# Patient Record
Sex: MALE | Race: WHITE | NOT HISPANIC OR LATINO | Employment: OTHER | URBAN - METROPOLITAN AREA
[De-identification: names, ages, dates, MRNs, and addresses within clinical notes are randomized per-mention and may not be internally consistent; named-entity substitution may affect disease eponyms.]

---

## 2022-09-26 ENCOUNTER — HOSPITAL ENCOUNTER (EMERGENCY)
Facility: HOSPITAL | Age: 52
Discharge: HOME | End: 2022-09-26
Attending: EMERGENCY MEDICINE
Payer: MEDICARE

## 2022-09-26 VITALS
TEMPERATURE: 98 F | WEIGHT: 315 LBS | BODY MASS INDEX: 41.75 KG/M2 | SYSTOLIC BLOOD PRESSURE: 144 MMHG | HEART RATE: 21 BPM | DIASTOLIC BLOOD PRESSURE: 81 MMHG | OXYGEN SATURATION: 98 % | RESPIRATION RATE: 19 BRPM | HEIGHT: 73 IN

## 2022-09-26 DIAGNOSIS — R52 PAIN: ICD-10-CM

## 2022-09-26 DIAGNOSIS — M54.32 SCIATICA OF LEFT SIDE: Primary | ICD-10-CM

## 2022-09-26 PROCEDURE — 63600175 PHARM REV CODE 636 W HCPCS: Performed by: STUDENT IN AN ORGANIZED HEALTH CARE EDUCATION/TRAINING PROGRAM

## 2022-09-26 PROCEDURE — 99284 EMERGENCY DEPT VISIT MOD MDM: CPT

## 2022-09-26 PROCEDURE — 96372 THER/PROPH/DIAG INJ SC/IM: CPT | Performed by: STUDENT IN AN ORGANIZED HEALTH CARE EDUCATION/TRAINING PROGRAM

## 2022-09-26 RX ORDER — HYDROCODONE BITARTRATE AND ACETAMINOPHEN 5; 325 MG/1; MG/1
1 TABLET ORAL EVERY 4 HOURS PRN
Qty: 10 TABLET | Refills: 0 | Status: SHIPPED | OUTPATIENT
Start: 2022-09-26 | End: 2023-08-17 | Stop reason: SDUPTHER

## 2022-09-26 RX ORDER — METHOCARBAMOL 500 MG/1
1000 TABLET, FILM COATED ORAL 3 TIMES DAILY
Qty: 30 TABLET | Refills: 0 | Status: SHIPPED | OUTPATIENT
Start: 2022-09-26 | End: 2022-10-01

## 2022-09-26 RX ORDER — DICLOFENAC SODIUM 75 MG/1
75 TABLET, DELAYED RELEASE ORAL 2 TIMES DAILY
Qty: 14 TABLET | Refills: 0 | Status: SHIPPED | OUTPATIENT
Start: 2022-09-26 | End: 2023-08-30 | Stop reason: CLARIF

## 2022-09-26 RX ORDER — DEXAMETHASONE SODIUM PHOSPHATE 4 MG/ML
4 INJECTION, SOLUTION INTRA-ARTICULAR; INTRALESIONAL; INTRAMUSCULAR; INTRAVENOUS; SOFT TISSUE
Status: COMPLETED | OUTPATIENT
Start: 2022-09-26 | End: 2022-09-26

## 2022-09-26 RX ADMIN — DEXAMETHASONE SODIUM PHOSPHATE 4 MG: 4 INJECTION, SOLUTION INTRA-ARTICULAR; INTRALESIONAL; INTRAMUSCULAR; INTRAVENOUS; SOFT TISSUE at 03:09

## 2022-09-26 NOTE — FIRST PROVIDER EVALUATION
"Medical screening examination initiated.  I have conducted a focused provider triage encounter, findings are as follows:    Brief history of present illness:  left hip and back pain, worse with walking, shoots down left leg. No red flag symtposm of back px    Vitals:    09/26/22 1253   BP: (!) 145/82   BP Location: Left arm   Patient Position: Sitting   Pulse: 64   Resp: 20   Temp: 98.2 °F (36.8 °C)   TempSrc: Oral   SpO2: 98%   Weight: (!) 146.1 kg (322 lb)   Height: 6' 1" (1.854 m)       Pertinent physical exam:  well appearing, no ain with internal rotation. Postive straight leg test. Neurovascularly intact, no swelling.    Brief workup plan:  xray lumbar and left hip, decadron injection    Preliminary workup initiated; this workup will be continued and followed by the physician or advanced practice provider that is assigned to the patient when roomed.  "

## 2022-09-26 NOTE — ED PROVIDER NOTES
Encounter Date: 9/26/2022       History     Chief Complaint   Patient presents with    Hip Pain     C/o L hip pain x 2 weeks. No recent trauma.      Patient presents emergency department with reported low back pain radiating to his left hip symptoms exacerbated with straight leg raise on left with prolonged standing the states he has had previous back pain but has never had positive sciatic of he has had multiple injuries in the past though has been told he had arthritis in his back years ago no previous surgeries reported patient states prior to onset of the symptoms 2 weeks ago he did have a fall out of his tractor landing on his blood midback no bowel or bladder incontinence or retention reported no numbness or tingling reported no weakness      Review of patient's allergies indicates:   Allergen Reactions    Codeine Swelling     Throat swell, rash     No past medical history on file.  No past surgical history on file.  No family history on file.     Review of Systems   Constitutional:  Negative for chills and fever.   Gastrointestinal:  Negative for abdominal pain.   Genitourinary: Negative.    Musculoskeletal:  Positive for arthralgias, back pain and myalgias.   Neurological:  Negative for weakness and numbness.     Physical Exam     Initial Vitals [09/26/22 1253]   BP Pulse Resp Temp SpO2   (!) 145/82 64 20 98.2 °F (36.8 °C) 98 %      MAP       --         Physical Exam    Constitutional: He appears well-developed and well-nourished. No distress.   HENT:   Head: Normocephalic and atraumatic.   Cardiovascular:  Normal rate, regular rhythm and intact distal pulses.           Pulmonary/Chest: No respiratory distress.   Abdominal: Abdomen is soft. There is no abdominal tenderness.   Musculoskeletal:         General: Tenderness present. Normal range of motion.      Comments: Tenderness to palpation at the lumbar spine palpable muscle spasms noted no midline spinal tenderness positive straight leg raise on left      Neurological: He has normal strength.   Reflex Scores:       Patellar reflexes are 2+ on the right side and 2+ on the left side.       Achilles reflexes are 2+ on the right side and 2+ on the left side.      ED Course   Procedures  Labs Reviewed - No data to display       Imaging Results              X-Ray Hip 2 or 3 views Left (with Pelvis when performed) (Final result)  Result time 09/26/22 15:29:11      Final result by Alexis Cantor MD (09/26/22 15:29:11)                   Narrative:    Reason: Fell few days ago; Left Hip pain and lower back pain/Left flank pain    FINDINGS:  AP pelvis and 2 views of left hip show no acute fracture, dislocation, or destructive osseous lesion. Bilateral hip joint spaces are maintained. Tiny marginal osteophytes arise from left femoral head neck junction. Pubic symphysis and sacroiliac joints are maintained. Degenerative spondylosis partially visualized at L3-L4 and L4-L5. Soft tissues are unremarkable.    IMPRESSION:  No acute left hip abnormality.    Electronically signed by:  Alexis Cantor MD  9/26/2022 3:29 PM CDT Workstation: [id number]                                     X-Ray Lumbar Spine 5 View (Final result)  Result time 09/26/22 15:23:14      Final result by Joao Shaw MD (09/26/22 15:23:14)                   Narrative:    HISTORY: Low back pain,  post fall.    FINDINGS: 5 views of the lumbar spine with no prior studies for comparison show retrolisthesis of the L4 vertebral body of 3-4 mm with respect to L3 and L5. Lumbar vertebral alignment is otherwise normal, with normal lordotic curvature, and no acute fractures or destructive osseous lesions.    There is mild to moderate multilevel intervertebral disc space narrowing with vertebral osteophytes, and mild lower lumbar facet hypertrophy. There is no evidence of spondylolysis. The sacroiliac joints are within normal limits. Bone mineralization is normal.    IMPRESSION:  1. No acute fractures or destructive  osseous lesions.  2. Retrolisthesis of the L4 vertebral body as described.  3. Multilevel lumbar degenerative disc disease and lower lumbar facet arthropathy.    Electronically signed by:  Joao Shaw MD  9/26/2022 3:23 PM CDT Workstation: [id number]                                     Medications   dexamethasone injection 4 mg (4 mg Intramuscular Given 9/26/22 1521)     Medical Decision Making:   ED Management:  Patient's radiographs shows degenerative disc disease no acute fractures I will refer patient to Dr. Tompkins is clinic patient received Decadron emergency department will prescribe diclofenac Robaxin and Norco recommend moist he rest return to ER for any worsened symptoms or new symptoms                         Clinical Impression:   Final diagnoses:  [R52] Pain  [M54.32] Sciatica of left side (Primary)        ED Disposition Condition    Discharge Stable          ED Prescriptions       Medication Sig Dispense Start Date End Date Auth. Provider    diclofenac (VOLTAREN) 75 MG EC tablet Take 1 tablet (75 mg total) by mouth 2 (two) times daily. 14 tablet 9/26/2022 -- Tim Jeter MD    methocarbamoL (ROBAXIN) 500 MG Tab Take 2 tablets (1,000 mg total) by mouth 3 (three) times daily. for 5 days 30 tablet 9/26/2022 10/1/2022 iTm Jeter MD    HYDROcodone-acetaminophen (NORCO) 5-325 mg per tablet Take 1 tablet by mouth every 4 (four) hours as needed for Pain. 10 tablet 9/26/2022 -- Tim Jeter MD          Follow-up Information       Follow up With Specialties Details Why Contact Info    Smith Tompkins MD Physical Medicine and Rehabilitation Call in 1 day for re-examination of your symptoms [address] [place] [address] [address], [address]  [facility] [id number] [phone]               Tim Jeter MD  09/26/22 6619

## 2023-06-28 DIAGNOSIS — R91.8 OTHER NONSPECIFIC ABNORMAL FINDING OF LUNG FIELD: Primary | ICD-10-CM

## 2023-08-09 ENCOUNTER — HOSPITAL ENCOUNTER (OUTPATIENT)
Dept: RADIOLOGY | Facility: HOSPITAL | Age: 53
Discharge: HOME | End: 2023-08-09
Attending: INTERNAL MEDICINE
Payer: MEDICARE

## 2023-08-09 VITALS — BODY MASS INDEX: 42.66 KG/M2 | HEIGHT: 72 IN | WEIGHT: 315 LBS

## 2023-08-09 DIAGNOSIS — R91.8 OTHER NONSPECIFIC ABNORMAL FINDING OF LUNG FIELD: ICD-10-CM

## 2023-08-09 LAB — GLUCOSE SERPL-MCNC: 155 MG/DL (ref 70–110)

## 2023-08-09 PROCEDURE — A9552 F18 FDG: HCPCS | Mod: PO

## 2023-08-10 ENCOUNTER — OFFICE VISIT (OUTPATIENT)
Dept: UROLOGY | Facility: CLINIC | Age: 53
End: 2023-08-10
Payer: MEDICARE

## 2023-08-10 VITALS — HEART RATE: 61 BPM

## 2023-08-10 DIAGNOSIS — N50.89 SCROTAL SWELLING: Primary | ICD-10-CM

## 2023-08-10 LAB
BILIRUBIN, UA POC OHS: NEGATIVE
BLOOD, UA POC OHS: NEGATIVE
CLARITY, UA POC OHS: CLEAR
COLOR, UA POC OHS: YELLOW
GLUCOSE, UA POC OHS: NEGATIVE
KETONES, UA POC OHS: NEGATIVE
LEUKOCYTES, UA POC OHS: NEGATIVE
NITRITE, UA POC OHS: NEGATIVE
PH, UA POC OHS: 5.5
PROTEIN, UA POC OHS: NEGATIVE
SPECIFIC GRAVITY, UA POC OHS: 1.01
UROBILINOGEN, UA POC OHS: 0.2

## 2023-08-10 PROCEDURE — 99999 PR PBB SHADOW E&M-EST. PATIENT-LVL III: CPT | Mod: PBBFAC,,, | Performed by: NURSE PRACTITIONER

## 2023-08-10 PROCEDURE — 81003 URINALYSIS AUTO W/O SCOPE: CPT | Mod: QW,S$GLB,, | Performed by: NURSE PRACTITIONER

## 2023-08-10 PROCEDURE — 1159F MED LIST DOCD IN RCRD: CPT | Mod: CPTII,S$GLB,, | Performed by: NURSE PRACTITIONER

## 2023-08-10 PROCEDURE — 1160F PR REVIEW ALL MEDS BY PRESCRIBER/CLIN PHARMACIST DOCUMENTED: ICD-10-PCS | Mod: CPTII,S$GLB,, | Performed by: NURSE PRACTITIONER

## 2023-08-10 PROCEDURE — 99999 PR PBB SHADOW E&M-EST. PATIENT-LVL III: ICD-10-PCS | Mod: PBBFAC,,, | Performed by: NURSE PRACTITIONER

## 2023-08-10 PROCEDURE — 81003 POCT URINALYSIS(INSTRUMENT): ICD-10-PCS | Mod: QW,S$GLB,, | Performed by: NURSE PRACTITIONER

## 2023-08-10 PROCEDURE — 99204 PR OFFICE/OUTPT VISIT, NEW, LEVL IV, 45-59 MIN: ICD-10-PCS | Mod: S$GLB,,, | Performed by: NURSE PRACTITIONER

## 2023-08-10 PROCEDURE — 1160F RVW MEDS BY RX/DR IN RCRD: CPT | Mod: CPTII,S$GLB,, | Performed by: NURSE PRACTITIONER

## 2023-08-10 PROCEDURE — 1159F PR MEDICATION LIST DOCUMENTED IN MEDICAL RECORD: ICD-10-PCS | Mod: CPTII,S$GLB,, | Performed by: NURSE PRACTITIONER

## 2023-08-10 PROCEDURE — 99204 OFFICE O/P NEW MOD 45 MIN: CPT | Mod: S$GLB,,, | Performed by: NURSE PRACTITIONER

## 2023-08-10 RX ORDER — METOCLOPRAMIDE 10 MG/1
10 TABLET ORAL 2 TIMES DAILY
COMMUNITY

## 2023-08-10 RX ORDER — ASPIRIN 500 MG
500 TABLET, DELAYED RELEASE (ENTERIC COATED) ORAL DAILY
COMMUNITY
End: 2023-12-13

## 2023-08-10 RX ORDER — FUROSEMIDE 40 MG/1
40 TABLET ORAL DAILY
COMMUNITY

## 2023-08-10 RX ORDER — GABAPENTIN 400 MG/1
400 CAPSULE ORAL 3 TIMES DAILY
COMMUNITY
End: 2023-10-23 | Stop reason: SDUPTHER

## 2023-08-10 NOTE — PROGRESS NOTES
"CHIEF COMPLAINT:    Mr. Lyles is a 53 y.o. male presenting for right scrotal swelling.  PRESENTING ILLNESS:    Tolu Lyles Sr. is a 53 y.o. male who presents for right scrotal swelling. This is his initial clinic visit.    8/10/23  Patient presents for right scrotal swelling x 1.5 years  He was treated for epididymitis initially 1.5 years ago but swelling returned after treatment.  He was told he had hydrocele and came today to discuss surgery.  He does have discomfort when walking or sitting for long periods of time.    10/18/21 PSA 0.22    Urethral stricture surgery ~10 years ago    No issues voiding. Good urinary stream. Denies dysuria, gross hematuria, flank pain, fever, chills, nausea or vomiting.  UA today negative  PVR 5 ml    History of kidney stones: denies  Personal or family hx of  malignancy: denies  Smoking history: never smoked    Urine cultures:   No results found for: "LABURIN"    REVIEW OF SYSTEMS:    Review of Systems    Constitutional: Negative for fever and chills.   HENT: Negative for hearing loss.   Eyes: Negative for visual disturbance.   Respiratory: Negative for shortness of breath.   Cardiovascular: Negative for chest pain.   Gastrointestinal: Negative for nausea, vomiting.   Genitourinary: See above  Neurological: Negative for dizziness.   Hematological: Does not bruise/bleed easily.   Psychiatric/Behavioral: Negative for confusion.       PATIENT HISTORY:    No past medical history on file.    No past surgical history on file.    No family history on file.    Social History     Socioeconomic History    Marital status:        Allergies:  Codeine    Medications:    Current Outpatient Medications:     aspirin 500 MG EC tablet, Take 500 mg by mouth once daily., Disp: , Rfl:     furosemide (LASIX) 40 MG tablet, Take 40 mg by mouth 2 (two) times daily., Disp: , Rfl:     gabapentin (NEURONTIN) 400 MG capsule, Take 400 mg by mouth 3 (three) times daily., Disp: , Rfl:     " "metoclopramide HCl (REGLAN) 10 MG tablet, Take 10 mg by mouth 4 (four) times daily., Disp: , Rfl:     diclofenac (VOLTAREN) 75 MG EC tablet, Take 1 tablet (75 mg total) by mouth 2 (two) times daily. (Patient not taking: Reported on 8/10/2023), Disp: 14 tablet, Rfl: 0    HYDROcodone-acetaminophen (NORCO) 5-325 mg per tablet, Take 1 tablet by mouth every 4 (four) hours as needed for Pain. (Patient not taking: Reported on 8/10/2023), Disp: 10 tablet, Rfl: 0    PHYSICAL EXAMINATION:    Constitutional: He is oriented to person, place, and time. He appears well-developed and well-nourished.  He is in no apparent distress.    Neck: Normal ROM.     Cardiovascular: Normal rate.      Pulmonary/Chest: Effort normal. No respiratory distress.     Abdominal:  He exhibits no distension.  There is no CVA tenderness.     Neurological: He is alert and oriented to person, place, and time.     Skin: Skin is warm and dry.     Psych: Cooperative with normal affect.    Genitourinary: + right hemiscrotal swelling    Physical Exam      LABS:    No results found for: "PSA", "PSADIAG", "PSATOTAL", "PSAFREE", "PSAFREEPCT"  No results found for: "CREATININE"      IMPRESSION:    Encounter Diagnoses   Name Primary?    Scrotal swelling Yes       PLAN:  -Scrotal US ordered and scheduled  -Pt will need to follow up with MD to discuss surgery/hydrocelectomy  -Scrotal support (e.g., jock strap), ice pack, scrotal elevation when lying down.   Informed patient that he should return to the emergency department immediately should he experience fever or worsening pain and swelling.   -RTC with MD     I encouraged him or any of his family members to call or email me with questions and/or concerns.      45 minutes of total time spent on the encounter, which includes face to face time and non-face to face time preparing to see the patient (eg, review of tests), Obtaining and/or reviewing separately obtained history, Documenting clinical information in the " electronic or other health record, Independently interpreting results (not separately reported) and communicating results to the patient/family/caregiver, or Care coordination (not separately reported).

## 2023-08-10 NOTE — PATIENT INSTRUCTIONS
Scrotal support (e.g., jock strap), ice pack, scrotal elevation when lying down.     Informed patient that he should return to the emergency department immediately should he experience fever or worsening pain and swelling.

## 2023-08-15 ENCOUNTER — HOSPITAL ENCOUNTER (OUTPATIENT)
Dept: RADIOLOGY | Facility: HOSPITAL | Age: 53
Discharge: HOME | End: 2023-08-15
Attending: NURSE PRACTITIONER
Payer: MEDICARE

## 2023-08-15 DIAGNOSIS — N50.89 SCROTAL SWELLING: ICD-10-CM

## 2023-08-15 PROCEDURE — 76870 US EXAM SCROTUM: CPT | Mod: 26,,, | Performed by: RADIOLOGY

## 2023-08-15 PROCEDURE — 76870 US SCROTUM AND TESTICLES: ICD-10-PCS | Mod: 26,,, | Performed by: RADIOLOGY

## 2023-08-15 PROCEDURE — 76870 US EXAM SCROTUM: CPT | Mod: TC

## 2023-08-17 ENCOUNTER — OFFICE VISIT (OUTPATIENT)
Dept: UROLOGY | Facility: CLINIC | Age: 53
End: 2023-08-17
Payer: MEDICARE

## 2023-08-17 ENCOUNTER — TELEPHONE (OUTPATIENT)
Dept: PULMONOLOGY | Facility: CLINIC | Age: 53
End: 2023-08-17
Payer: MEDICARE

## 2023-08-17 ENCOUNTER — OFFICE VISIT (OUTPATIENT)
Dept: PULMONOLOGY | Facility: CLINIC | Age: 53
End: 2023-08-17
Payer: MEDICARE

## 2023-08-17 ENCOUNTER — TELEPHONE (OUTPATIENT)
Dept: FAMILY MEDICINE | Facility: CLINIC | Age: 53
End: 2023-08-17
Payer: MEDICARE

## 2023-08-17 VITALS
BODY MASS INDEX: 33.49 KG/M2 | HEART RATE: 73 BPM | OXYGEN SATURATION: 96 % | SYSTOLIC BLOOD PRESSURE: 146 MMHG | WEIGHT: 247.25 LBS | HEIGHT: 72 IN | DIASTOLIC BLOOD PRESSURE: 78 MMHG

## 2023-08-17 VITALS
HEIGHT: 72 IN | DIASTOLIC BLOOD PRESSURE: 83 MMHG | HEART RATE: 72 BPM | WEIGHT: 253 LBS | SYSTOLIC BLOOD PRESSURE: 126 MMHG | BODY MASS INDEX: 34.27 KG/M2

## 2023-08-17 DIAGNOSIS — R91.8 LUNG MASS: ICD-10-CM

## 2023-08-17 DIAGNOSIS — N43.3 HYDROCELE, UNSPECIFIED HYDROCELE TYPE: Primary | ICD-10-CM

## 2023-08-17 DIAGNOSIS — G89.29 CHRONIC BACK PAIN GREATER THAN 3 MONTHS DURATION: ICD-10-CM

## 2023-08-17 DIAGNOSIS — M54.9 CHRONIC BACK PAIN GREATER THAN 3 MONTHS DURATION: ICD-10-CM

## 2023-08-17 DIAGNOSIS — I20.9 CARDIAC ANGINA: ICD-10-CM

## 2023-08-17 DIAGNOSIS — Z87.448 HISTORY OF URETHRAL STRICTURE: ICD-10-CM

## 2023-08-17 DIAGNOSIS — R45.89 ANXIETY ABOUT HEALTH: ICD-10-CM

## 2023-08-17 DIAGNOSIS — L72.3 SEBACEOUS CYST: ICD-10-CM

## 2023-08-17 DIAGNOSIS — J44.9 CHRONIC OBSTRUCTIVE PULMONARY DISEASE, UNSPECIFIED COPD TYPE: Primary | ICD-10-CM

## 2023-08-17 PROCEDURE — 1160F RVW MEDS BY RX/DR IN RCRD: CPT | Mod: CPTII,S$GLB,, | Performed by: NURSE PRACTITIONER

## 2023-08-17 PROCEDURE — 3008F BODY MASS INDEX DOCD: CPT | Mod: CPTII,S$GLB,, | Performed by: NURSE PRACTITIONER

## 2023-08-17 PROCEDURE — 99214 OFFICE O/P EST MOD 30 MIN: CPT | Mod: S$GLB,,, | Performed by: UROLOGY

## 2023-08-17 PROCEDURE — 3074F PR MOST RECENT SYSTOLIC BLOOD PRESSURE < 130 MM HG: ICD-10-PCS | Mod: CPTII,S$GLB,, | Performed by: UROLOGY

## 2023-08-17 PROCEDURE — 3008F BODY MASS INDEX DOCD: CPT | Mod: CPTII,S$GLB,, | Performed by: UROLOGY

## 2023-08-17 PROCEDURE — 3008F PR BODY MASS INDEX (BMI) DOCUMENTED: ICD-10-PCS | Mod: CPTII,S$GLB,, | Performed by: UROLOGY

## 2023-08-17 PROCEDURE — 3077F SYST BP >= 140 MM HG: CPT | Mod: CPTII,S$GLB,, | Performed by: NURSE PRACTITIONER

## 2023-08-17 PROCEDURE — 99214 PR OFFICE/OUTPT VISIT, EST, LEVL IV, 30-39 MIN: ICD-10-PCS | Mod: S$GLB,,, | Performed by: NURSE PRACTITIONER

## 2023-08-17 PROCEDURE — 3077F PR MOST RECENT SYSTOLIC BLOOD PRESSURE >= 140 MM HG: ICD-10-PCS | Mod: CPTII,S$GLB,, | Performed by: NURSE PRACTITIONER

## 2023-08-17 PROCEDURE — 1160F RVW MEDS BY RX/DR IN RCRD: CPT | Mod: CPTII,S$GLB,, | Performed by: UROLOGY

## 2023-08-17 PROCEDURE — 1159F MED LIST DOCD IN RCRD: CPT | Mod: CPTII,S$GLB,, | Performed by: NURSE PRACTITIONER

## 2023-08-17 PROCEDURE — 99999 PR PBB SHADOW E&M-EST. PATIENT-LVL V: CPT | Mod: PBBFAC,,, | Performed by: NURSE PRACTITIONER

## 2023-08-17 PROCEDURE — 3008F PR BODY MASS INDEX (BMI) DOCUMENTED: ICD-10-PCS | Mod: CPTII,S$GLB,, | Performed by: NURSE PRACTITIONER

## 2023-08-17 PROCEDURE — 3074F SYST BP LT 130 MM HG: CPT | Mod: CPTII,S$GLB,, | Performed by: UROLOGY

## 2023-08-17 PROCEDURE — 1159F MED LIST DOCD IN RCRD: CPT | Mod: CPTII,S$GLB,, | Performed by: UROLOGY

## 2023-08-17 PROCEDURE — 1160F PR REVIEW ALL MEDS BY PRESCRIBER/CLIN PHARMACIST DOCUMENTED: ICD-10-PCS | Mod: CPTII,S$GLB,, | Performed by: NURSE PRACTITIONER

## 2023-08-17 PROCEDURE — 1159F PR MEDICATION LIST DOCUMENTED IN MEDICAL RECORD: ICD-10-PCS | Mod: CPTII,S$GLB,, | Performed by: UROLOGY

## 2023-08-17 PROCEDURE — 1160F PR REVIEW ALL MEDS BY PRESCRIBER/CLIN PHARMACIST DOCUMENTED: ICD-10-PCS | Mod: CPTII,S$GLB,, | Performed by: UROLOGY

## 2023-08-17 PROCEDURE — 99999 PR PBB SHADOW E&M-EST. PATIENT-LVL V: ICD-10-PCS | Mod: PBBFAC,,, | Performed by: NURSE PRACTITIONER

## 2023-08-17 PROCEDURE — 1159F PR MEDICATION LIST DOCUMENTED IN MEDICAL RECORD: ICD-10-PCS | Mod: CPTII,S$GLB,, | Performed by: NURSE PRACTITIONER

## 2023-08-17 PROCEDURE — 99999 PR PBB SHADOW E&M-EST. PATIENT-LVL V: CPT | Mod: PBBFAC,,, | Performed by: UROLOGY

## 2023-08-17 PROCEDURE — 99999 PR PBB SHADOW E&M-EST. PATIENT-LVL V: ICD-10-PCS | Mod: PBBFAC,,, | Performed by: UROLOGY

## 2023-08-17 PROCEDURE — 3079F DIAST BP 80-89 MM HG: CPT | Mod: CPTII,S$GLB,, | Performed by: UROLOGY

## 2023-08-17 PROCEDURE — 3078F PR MOST RECENT DIASTOLIC BLOOD PRESSURE < 80 MM HG: ICD-10-PCS | Mod: CPTII,S$GLB,, | Performed by: NURSE PRACTITIONER

## 2023-08-17 PROCEDURE — 3079F PR MOST RECENT DIASTOLIC BLOOD PRESSURE 80-89 MM HG: ICD-10-PCS | Mod: CPTII,S$GLB,, | Performed by: UROLOGY

## 2023-08-17 PROCEDURE — 99214 PR OFFICE/OUTPT VISIT, EST, LEVL IV, 30-39 MIN: ICD-10-PCS | Mod: S$GLB,,, | Performed by: UROLOGY

## 2023-08-17 PROCEDURE — 3078F DIAST BP <80 MM HG: CPT | Mod: CPTII,S$GLB,, | Performed by: NURSE PRACTITIONER

## 2023-08-17 PROCEDURE — 99214 OFFICE O/P EST MOD 30 MIN: CPT | Mod: S$GLB,,, | Performed by: NURSE PRACTITIONER

## 2023-08-17 RX ORDER — METFORMIN HYDROCHLORIDE 1000 MG/1
1000 TABLET ORAL 2 TIMES DAILY WITH MEALS
COMMUNITY
Start: 2023-07-07

## 2023-08-17 RX ORDER — ALBUTEROL SULFATE 0.83 MG/ML
2.5 SOLUTION RESPIRATORY (INHALATION) EVERY 6 HOURS PRN
Qty: 120 ML | Refills: 3 | Status: SHIPPED | OUTPATIENT
Start: 2023-08-17 | End: 2024-08-16

## 2023-08-17 RX ORDER — INSULIN GLARGINE-YFGN 100 [IU]/ML
INJECTION, SOLUTION SUBCUTANEOUS
COMMUNITY
End: 2023-08-30 | Stop reason: CLARIF

## 2023-08-17 RX ORDER — METHOCARBAMOL 750 MG/1
750 TABLET, FILM COATED ORAL 2 TIMES DAILY
COMMUNITY
Start: 2023-08-15

## 2023-08-17 RX ORDER — OXYCODONE HYDROCHLORIDE 10 MG/1
TABLET ORAL
COMMUNITY
Start: 2023-07-27 | End: 2023-08-30 | Stop reason: CLARIF

## 2023-08-17 RX ORDER — BUDESONIDE, GLYCOPYRROLATE, AND FORMOTEROL FUMARATE 160; 9; 4.8 UG/1; UG/1; UG/1
2 AEROSOL, METERED RESPIRATORY (INHALATION) 2 TIMES DAILY
Qty: 10.7 G | Refills: 11 | Status: SHIPPED | OUTPATIENT
Start: 2023-08-17 | End: 2023-11-09

## 2023-08-17 RX ORDER — OMEPRAZOLE 40 MG/1
40 CAPSULE, DELAYED RELEASE ORAL DAILY
COMMUNITY

## 2023-08-17 RX ORDER — ALPRAZOLAM 0.5 MG/1
0.5 TABLET ORAL 3 TIMES DAILY
Qty: 21 TABLET | Refills: 0 | Status: SHIPPED | OUTPATIENT
Start: 2023-08-17 | End: 2023-08-30 | Stop reason: CLARIF

## 2023-08-17 NOTE — TELEPHONE ENCOUNTER
Spoke with patient in regards to appt on 8/18/23 at 2:15pm needing to be rescheduled due to provider being out of the office.   PT has been rescheduled for  8/25/23 at 2:15pm.   Pt verbalized appt.

## 2023-08-17 NOTE — PATIENT INSTRUCTIONS
Encounter Diagnoses   Name Primary?    Hydrocele, unspecified hydrocele type Yes    Lung mass     Sebaceous cyst     History of urethral stricture     Chronic back pain greater than 3 months duration      PLAN:    Hydrocele-can schedule R hydrocelectomy but have to confirm he has a normal a1c, if > 7.5 high risk for infection. Also would have to hold aspirin for 7 days before. Would need clearance by cardiology. Has had 7 stents.   Call us if there is an infection starting and can start abx    Sebaceous cyst- can remove at time of his hydrocele. Gets infected intermittently. Can use bactrim antibiotic as needed.     Lung nodule- wrote Adilene Gonzalez she will get him in with pulmonlogy today. Referral placed to pulmonology. Lung nodule more urgent. Also having chest pain.     Schedule screening psa.     Referral to cardiology- h/o 7 cardiac stents  Referral to pain medicine    F/u with pcp to establish care to f/u on all of the above.     Fu with me 3 months

## 2023-08-17 NOTE — TELEPHONE ENCOUNTER
Tried call to get scheduled as new pt referred by Dr. Reed. But no answer nor voicemail set up yet

## 2023-08-17 NOTE — PROGRESS NOTES
"[facility], formerly known as [facility]  Group MD's:Brianna/Nathan/Pravin/Nestor/Sharonda  Group NP's: Patti Johnson, Beena Hoffman    PCP: Kuldeep Bunn MD  Date of Service: 08/17/2023  Today's note written by: MD Brianna      CHIEF COMPLAINT:    Mr. Lyles is a 53 y.o. male presenting for right scrotal swelling.  PRESENTING ILLNESS:    Tolu Lyles Sr. is a 53 y.o. male who presents for right scrotal swelling. This is his initial clinic visit.    8/10/23 by Beena   Patient presents for right scrotal swelling x 1.5 years  He was treated for epididymitis initially 1.5 years ago but swelling returned after treatment.  He was told he had hydrocele and came today to discuss surgery.  He does have discomfort when walking or sitting for long periods of time.    10/18/21 PSA 0.22    Urethral stricture surgery ~10 years ago    No issues voiding. Good urinary stream. Denies dysuria, gross hematuria, flank pain, fever, chills, nausea or vomiting.  UA today negative  PVR 5 ml    History of kidney stones: denies  Personal or family hx of  malignancy: denies  Smoking history: never smoked    Interval history 8/17/23:  Us scrotum 8/9/23: large right and small L hydrocele        He is here to discuss his hydrocele. Very big and bothersome. Also says he has been getting intermittent epididymitis on the right. He says started after an episode of sepsis for what he thought was epdidiymitis but on exam today it's a sebaceous cyst that increases in size.He did have stricture surgery every 5-10 years He says hes been getting recurrent epididymitis as  Takes pain medication for recent accident.   On asa 500mg daily for pain   No results found for: "LABA1C", "HGBA1C"  On exam today large R hyrocele. Buried  penis due to hydrocele. Circumcised. Small sebaceous cyst R groin not currently infected.       Urine history: family history of kidney, bladder or prostate cancer:No, personal or " "family history of kidney stones: No,tobacco use: Yes - 1/2 to 1 ppd x  40 years, anticoagulation: Yes - asa 500mg daily   8/10/23 neg    PSA History: no fam hx of prostate cancer  10/18/21 0.25  No results found for: "PSA", "PSATOTAL", "PSAFREE", "PSAFREEPCT"        REVIEW OF SYSTEMS:    Neg except for as stated above      PATIENT HISTORY:    No past medical history on file.  Diabetes  Chronic pain    No past surgical history on file.  appendectomy    No family history on file.    Social History     Socioeconomic History    Marital status:        Allergies:  Codeine        PHYSICAL EXAMINATION:  Vitals:    08/17/23 0934   BP: 126/83   Pulse: 72       Gu exam as above    LABS:      IMPRESSION:    Encounter Diagnoses   Name Primary?    Hydrocele, unspecified hydrocele type Yes    Lung mass     Sebaceous cyst     History of urethral stricture     Chronic back pain greater than 3 months duration      PLAN:    Hydrocele-can schedule R hydrocelectomy but have to confirm he has a normal a1c, if > 7.5 high risk for infection. Also would have to hold aspirin for 7 days before. Would need clearance by cardiology. Has had 7 stents.   Call us if there is an infection starting and can start abx    Sebaceous cyst- can remove at time of his hydrocele. Gets infected intermittently. Can use bactrim antibiotic as needed.     Lung nodule- wrote Adilene Gonzalez she will get him in with pulmonlogy today. Referral placed to pulmonology. Lung nodule more urgent. Also having chest pain.     Schedule screening psa.     Referral to cardiology- h/o 7 cardiac stents  Referral to pain medicine    F/u with pcp to establish care to f/u on all of the above.     Fu with me 3 months       "
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
## 2023-08-18 ENCOUNTER — PATIENT MESSAGE (OUTPATIENT)
Dept: PULMONOLOGY | Facility: CLINIC | Age: 53
End: 2023-08-18
Payer: MEDICARE

## 2023-08-21 NOTE — PATIENT INSTRUCTIONS
Contacting MD Dr. Kelly for robotic navigational bronchoscopy for tissue diagnosis      Take Xanax to help with anxiety, do not drive or make critical decisions until you know how it affects you      If nodule tissue shows infection will treat with appropriate antibiotics.  If nodule tissue shows cancer Will refer to oncology. If you have a provider in mind just contact the pulmonary clinic and I can add to referral.

## 2023-08-23 ENCOUNTER — OFFICE VISIT (OUTPATIENT)
Dept: CARDIOLOGY | Facility: CLINIC | Age: 53
End: 2023-08-23
Payer: MEDICARE

## 2023-08-23 VITALS
OXYGEN SATURATION: 97 % | HEART RATE: 78 BPM | SYSTOLIC BLOOD PRESSURE: 138 MMHG | WEIGHT: 261.94 LBS | DIASTOLIC BLOOD PRESSURE: 85 MMHG | BODY MASS INDEX: 35.52 KG/M2

## 2023-08-23 DIAGNOSIS — R91.1 NODULE OF APEX OF LUNG: ICD-10-CM

## 2023-08-23 DIAGNOSIS — I87.2 VENOUS (PERIPHERAL) INSUFFICIENCY: ICD-10-CM

## 2023-08-23 DIAGNOSIS — S39.92XS INJURY OF LOW BACK, SEQUELA: ICD-10-CM

## 2023-08-23 DIAGNOSIS — Z95.5 H/O HEART ARTERY STENT: ICD-10-CM

## 2023-08-23 DIAGNOSIS — E11.9 DIABETES MELLITUS WITHOUT COMPLICATION: ICD-10-CM

## 2023-08-23 DIAGNOSIS — Z00.00 ENCOUNTER FOR MEDICAL EXAMINATION TO ESTABLISH CARE: Primary | ICD-10-CM

## 2023-08-23 DIAGNOSIS — R59.9 LYMPH NODE ENLARGEMENT: ICD-10-CM

## 2023-08-23 DIAGNOSIS — Z72.0 TOBACCO ABUSE DISORDER: ICD-10-CM

## 2023-08-23 DIAGNOSIS — Z01.818 PREOPERATIVE CLEARANCE: ICD-10-CM

## 2023-08-23 DIAGNOSIS — I25.10 CORONARY ARTERY DISEASE INVOLVING NATIVE CORONARY ARTERY OF NATIVE HEART WITHOUT ANGINA PECTORIS: ICD-10-CM

## 2023-08-23 DIAGNOSIS — I20.9 CARDIAC ANGINA: ICD-10-CM

## 2023-08-23 DIAGNOSIS — N43.3 HYDROCELE, UNSPECIFIED HYDROCELE TYPE: ICD-10-CM

## 2023-08-23 DIAGNOSIS — I20.9 ANGINA PECTORIS, UNSPECIFIED: ICD-10-CM

## 2023-08-23 DIAGNOSIS — J43.1 PANLOBULAR EMPHYSEMA: ICD-10-CM

## 2023-08-23 DIAGNOSIS — R63.4 RECENT UNEXPLAINED WEIGHT LOSS: ICD-10-CM

## 2023-08-23 DIAGNOSIS — I10 PRIMARY HYPERTENSION: ICD-10-CM

## 2023-08-23 DIAGNOSIS — I74.9 EMBOLISM AND THROMBOSIS OF UNSPECIFIED ARTERY: ICD-10-CM

## 2023-08-23 PROCEDURE — 1159F MED LIST DOCD IN RCRD: CPT | Mod: CPTII,S$GLB,, | Performed by: GENERAL PRACTICE

## 2023-08-23 PROCEDURE — 1159F PR MEDICATION LIST DOCUMENTED IN MEDICAL RECORD: ICD-10-PCS | Mod: CPTII,S$GLB,, | Performed by: GENERAL PRACTICE

## 2023-08-23 PROCEDURE — 3079F PR MOST RECENT DIASTOLIC BLOOD PRESSURE 80-89 MM HG: ICD-10-PCS | Mod: CPTII,S$GLB,, | Performed by: GENERAL PRACTICE

## 2023-08-23 PROCEDURE — 3079F DIAST BP 80-89 MM HG: CPT | Mod: CPTII,S$GLB,, | Performed by: GENERAL PRACTICE

## 2023-08-23 PROCEDURE — 99999 PR PBB SHADOW E&M-EST. PATIENT-LVL IV: CPT | Mod: PBBFAC,,, | Performed by: GENERAL PRACTICE

## 2023-08-23 PROCEDURE — 93000 ELECTROCARDIOGRAM COMPLETE: CPT | Mod: S$GLB,,, | Performed by: GENERAL PRACTICE

## 2023-08-23 PROCEDURE — 99205 PR OFFICE/OUTPT VISIT, NEW, LEVL V, 60-74 MIN: ICD-10-PCS | Mod: 25,S$GLB,, | Performed by: GENERAL PRACTICE

## 2023-08-23 PROCEDURE — 3008F PR BODY MASS INDEX (BMI) DOCUMENTED: ICD-10-PCS | Mod: CPTII,S$GLB,, | Performed by: GENERAL PRACTICE

## 2023-08-23 PROCEDURE — 99205 OFFICE O/P NEW HI 60 MIN: CPT | Mod: 25,S$GLB,, | Performed by: GENERAL PRACTICE

## 2023-08-23 PROCEDURE — 93000 EKG 12-LEAD: ICD-10-PCS | Mod: S$GLB,,, | Performed by: GENERAL PRACTICE

## 2023-08-23 PROCEDURE — 99999 PR PBB SHADOW E&M-EST. PATIENT-LVL IV: ICD-10-PCS | Mod: PBBFAC,,, | Performed by: GENERAL PRACTICE

## 2023-08-23 PROCEDURE — 3075F PR MOST RECENT SYSTOLIC BLOOD PRESS GE 130-139MM HG: ICD-10-PCS | Mod: CPTII,S$GLB,, | Performed by: GENERAL PRACTICE

## 2023-08-23 PROCEDURE — 3075F SYST BP GE 130 - 139MM HG: CPT | Mod: CPTII,S$GLB,, | Performed by: GENERAL PRACTICE

## 2023-08-23 PROCEDURE — 3008F BODY MASS INDEX DOCD: CPT | Mod: CPTII,S$GLB,, | Performed by: GENERAL PRACTICE

## 2023-08-23 RX ORDER — OXYCODONE AND ACETAMINOPHEN 10; 325 MG/1; MG/1
TABLET ORAL
COMMUNITY
Start: 2023-06-20 | End: 2023-08-30 | Stop reason: CLARIF

## 2023-08-23 NOTE — LETTER
2023    Tolu Lyles Sr.  [address] [facility] [place] [address]             [facility]  [address]  [place] [address]  Phone: [phone]  Fax: [phone] Patient: Tolu Lyles Sr.  : 1970  Referring Doctor: Alvin Fernandez MD  Telephone:[phone]  Date of Last Office Visit:2023  Consulting Provider: Raymond Kelly MD  Procedure: Endobronchial Ultrasound      Current Outpatient Medications   Medication Sig    albuterol (PROVENTIL) 2.5 mg /3 mL (0.083 %) nebulizer solution Take 3 mLs (2.5 mg total) by nebulization every 6 (six) hours as needed for Wheezing. Rescue    aspirin 500 MG EC tablet Take 500 mg by mouth once daily.    budesonide-glycopyr-formoterol (BREZTRI AEROSPHERE) 160-9-4.8 mcg/actuation HFAA Inhale 2 puffs into the lungs 2 (two) times a day.    furosemide (LASIX) 40 MG tablet Take 40 mg by mouth 2 (two) times daily.    gabapentin (NEURONTIN) 400 MG capsule Take 400 mg by mouth 3 (three) times daily.    insulin glargine-yfgn 100 unit/mL (3 mL) InPn     metFORMIN (GLUCOPHAGE) 1000 MG tablet Take 1,000 mg by mouth 2 (two) times daily with meals.    methocarbamoL (ROBAXIN) 750 MG Tab Take 750 mg by mouth 2 (two) times daily.    metoclopramide HCl (REGLAN) 10 MG tablet Take 10 mg by mouth 4 (four) times daily.    oxyCODONE (ROXICODONE) 10 mg Tab immediate release tablet     oxyCODONE-acetaminophen (PERCOCET)  mg per tablet     ALPRAZolam (XANAX) 0.5 MG tablet Take 1 tablet (0.5 mg total) by mouth 3 (three) times daily. for 7 days (Patient not taking: Reported on 2023)    diclofenac (VOLTAREN) 75 MG EC tablet Take 1 tablet (75 mg total) by mouth 2 (two) times daily. (Patient not taking: Reported on 2023)    omeprazole (PRILOSEC) 40 MG capsule TAKE 1 CAPSULE (40 MG TOTAL) BY MOUTH DAILY.     No current facility-administered medications for this visit.       This patient has  been assessed for risk factors for clearance of surgery with the following stipulations: Moderate Risk    ___ No contraindications  ___ Recommendations for antiplatelet/anticoagulant medications: Hold ASA for ____ days prior to procedure.   ___X Cleared for surgery with the following contraindications/precautions:  ___ Not cleared for surgery due to the following reasons:    High risk but not not prohibitive.    6% risk of Cardiac event with anesthesia within 30 days    If you have any questions regarding the above, please contact my office at [phone]    Sincerely,
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
## 2023-09-01 ENCOUNTER — TELEPHONE (OUTPATIENT)
Dept: PULMONOLOGY | Facility: CLINIC | Age: 53
End: 2023-09-01
Payer: MEDICARE

## 2023-09-01 DIAGNOSIS — R91.8 MASS OF LEFT LUNG: ICD-10-CM

## 2023-09-01 NOTE — TELEPHONE ENCOUNTER
Left voice mail for patient, ordering blood test called [facility].     Instructed Mr. Lyles to expect a phone call from phlebotomy group.    Instructed to send message in portal to schedule a time to talk on phone.

## 2023-09-11 ENCOUNTER — TELEPHONE (OUTPATIENT)
Dept: PULMONOLOGY | Facility: CLINIC | Age: 53
End: 2023-09-11
Payer: MEDICARE

## 2023-09-12 ENCOUNTER — OFFICE VISIT (OUTPATIENT)
Dept: PULMONOLOGY | Facility: CLINIC | Age: 53
End: 2023-09-12
Payer: MEDICARE

## 2023-09-12 VITALS
HEIGHT: 72 IN | WEIGHT: 263.75 LBS | HEART RATE: 65 BPM | SYSTOLIC BLOOD PRESSURE: 122 MMHG | BODY MASS INDEX: 35.72 KG/M2 | OXYGEN SATURATION: 97 % | DIASTOLIC BLOOD PRESSURE: 76 MMHG

## 2023-09-12 DIAGNOSIS — R91.8 LUNG MASS: ICD-10-CM

## 2023-09-12 DIAGNOSIS — G89.11 ACUTE PAIN DUE TO INJURY: Primary | ICD-10-CM

## 2023-09-12 DIAGNOSIS — R45.89 ANXIETY ABOUT HEALTH: ICD-10-CM

## 2023-09-12 PROCEDURE — 1159F PR MEDICATION LIST DOCUMENTED IN MEDICAL RECORD: ICD-10-PCS | Mod: CPTII,S$GLB,, | Performed by: NURSE PRACTITIONER

## 2023-09-12 PROCEDURE — 3078F DIAST BP <80 MM HG: CPT | Mod: CPTII,S$GLB,, | Performed by: NURSE PRACTITIONER

## 2023-09-12 PROCEDURE — 3008F BODY MASS INDEX DOCD: CPT | Mod: CPTII,S$GLB,, | Performed by: NURSE PRACTITIONER

## 2023-09-12 PROCEDURE — 99999 PR PBB SHADOW E&M-EST. PATIENT-LVL III: CPT | Mod: PBBFAC,,, | Performed by: NURSE PRACTITIONER

## 2023-09-12 PROCEDURE — 3078F PR MOST RECENT DIASTOLIC BLOOD PRESSURE < 80 MM HG: ICD-10-PCS | Mod: CPTII,S$GLB,, | Performed by: NURSE PRACTITIONER

## 2023-09-12 PROCEDURE — 1159F MED LIST DOCD IN RCRD: CPT | Mod: CPTII,S$GLB,, | Performed by: NURSE PRACTITIONER

## 2023-09-12 PROCEDURE — 3074F SYST BP LT 130 MM HG: CPT | Mod: CPTII,S$GLB,, | Performed by: NURSE PRACTITIONER

## 2023-09-12 PROCEDURE — 99999 PR PBB SHADOW E&M-EST. PATIENT-LVL III: ICD-10-PCS | Mod: PBBFAC,,, | Performed by: NURSE PRACTITIONER

## 2023-09-12 PROCEDURE — 99214 PR OFFICE/OUTPT VISIT, EST, LEVL IV, 30-39 MIN: ICD-10-PCS | Mod: S$GLB,,, | Performed by: NURSE PRACTITIONER

## 2023-09-12 PROCEDURE — 3008F PR BODY MASS INDEX (BMI) DOCUMENTED: ICD-10-PCS | Mod: CPTII,S$GLB,, | Performed by: NURSE PRACTITIONER

## 2023-09-12 PROCEDURE — 99214 OFFICE O/P EST MOD 30 MIN: CPT | Mod: S$GLB,,, | Performed by: NURSE PRACTITIONER

## 2023-09-12 PROCEDURE — 3074F PR MOST RECENT SYSTOLIC BLOOD PRESSURE < 130 MM HG: ICD-10-PCS | Mod: CPTII,S$GLB,, | Performed by: NURSE PRACTITIONER

## 2023-09-12 RX ORDER — OXYCODONE HYDROCHLORIDE 30 MG/1
30 TABLET ORAL
Qty: 56 TABLET | Refills: 0 | Status: SHIPPED | OUTPATIENT
Start: 2023-09-12 | End: 2023-09-13 | Stop reason: SDUPTHER

## 2023-09-12 RX ORDER — ALPRAZOLAM 0.25 MG/1
0.25 TABLET ORAL 2 TIMES DAILY PRN
Qty: 10 TABLET | Refills: 0 | Status: SHIPPED | OUTPATIENT
Start: 2023-09-12 | End: 2023-10-23

## 2023-09-12 RX ORDER — NALOXONE HYDROCHLORIDE 4 MG/.1ML
1 SPRAY NASAL ONCE
Qty: 1 EACH | Refills: 0 | Status: SHIPPED | OUTPATIENT
Start: 2023-09-12 | End: 2023-09-12
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
## 2023-09-12 NOTE — PATIENT INSTRUCTIONS
Do not take pain medication and xanax together. Show son instructions on using Narcan.     Will proceed with Navigational bronchoscopy, cardiology is needed for clearance.     This does not need to be delayed any further.

## 2023-09-13 ENCOUNTER — TELEPHONE (OUTPATIENT)
Dept: PULMONOLOGY | Facility: CLINIC | Age: 53
End: 2023-09-13
Payer: MEDICARE

## 2023-09-13 DIAGNOSIS — R52 ACUTE PAIN: Primary | ICD-10-CM

## 2023-09-13 DIAGNOSIS — G89.11 ACUTE PAIN DUE TO INJURY: ICD-10-CM

## 2023-09-13 RX ORDER — OXYCODONE HYDROCHLORIDE 30 MG/1
30 TABLET ORAL EVERY 4 HOURS PRN
Qty: 42 TABLET | Refills: 0 | Status: SHIPPED | OUTPATIENT
Start: 2023-09-13 | End: 2023-09-13 | Stop reason: DRUGHIGH

## 2023-09-13 RX ORDER — OXYCODONE HYDROCHLORIDE 20 MG/1
20 TABLET ORAL EVERY 4 HOURS PRN
Qty: 42 TABLET | Refills: 0 | Status: SHIPPED | OUTPATIENT
Start: 2023-09-13 | End: 2023-10-23

## 2023-09-13 NOTE — TELEPHONE ENCOUNTER
Spoke to patient, insurance doesn't want to cover rx. Due to dosage.     ----- Message from Shanell Gilman sent at 9/13/2023  1:43 PM CDT -----  Regarding: RX issue  Contact: pt  Type:  Needs Medical Advice    Who Called: pt    Pharmacy name and phone #:    [facility] - [place], [place] - [address]  [address]  [place] [address]  Phone: [phone] Fax: [phone]      Would the patient rather a call back or a response via [facility]? Call back    Best Call Back Number: [phone]    Additional Information: sts he would like to speak to the Dr regarding a RX oxyCODONE (ROXICODONE) 30 MG Tabthat the pharmacy is having trouble with--please advise and thank you

## 2023-09-13 NOTE — TELEPHONE ENCOUNTER
----- Message from Adilene Gonzalez NP sent at 9/13/2023  1:55 PM CDT -----  Regarding: RE: [facility] pharmacy  Contact: Patient  Every 3 hours as needed for 7 days.     Adliene Gonzalez NP  ----- Message -----  From: Gabby Del Valle LPN  Sent: 9/13/2023  10:10 AM CDT  To: Adilene Gonzalez NP  Subject: [facility] pharmacy                                  Is the pt suppose to be taking every 3 hours ?  ----- Message -----  From: Sweetie Torrez  Sent: 9/13/2023   9:26 AM CDT  To: Carlos Head Staff    Type:  Pharmacy Calling to Clarify an RX    Name of Caller:  Patient    Pharmacy Name:      [facility] - [place], [place] - [address] [address]  Phone: [phone] Fax: [phone]    Prescription Name:  oxyCODONE (ROXICODONE) 30 MG Tab    What do they need to clarify?: Prescription    Best Call Back Number:  [phone]    Additional Information:   States he needs to speak with someone in the office - states pharmacy wants to speak with you to clarify if the prescription is correct - states he is in pain and been trying to get his prescription for a month - please call to advise - thank you

## 2023-09-13 NOTE — TELEPHONE ENCOUNTER
Spoke to pharmacy     ----- Message from Sweetie Torrez sent at 9/13/2023  9:23 AM CDT -----  Contact: Patient  Type:  Pharmacy Calling to Clarify an RX    Name of Caller:  Patient    Pharmacy Name:      [facility] - [place], [place] - [address] [facility]  [address]  [place] [address]  Phone: [phone] Fax: [phone]    Prescription Name:  oxyCODONE (ROXICODONE) 30 MG Tab    What do they need to clarify?: Prescription    Best Call Back Number:  [phone]    Additional Information:   States he needs to speak with someone in the office - states pharmacy wants to speak with you to clarify if the prescription is correct - states he is in pain and been trying to get his prescription for a month - please call to advise - thank you

## 2023-09-14 ENCOUNTER — TELEPHONE (OUTPATIENT)
Dept: PULMONOLOGY | Facility: CLINIC | Age: 53
End: 2023-09-14
Payer: MEDICARE

## 2023-09-14 NOTE — TELEPHONE ENCOUNTER
----- Message from Adilene Gonzalez NP sent at 9/13/2023  5:29 PM CDT -----  Regarding: RE: RX issue  Contact: pt  I spoke to pharmacist, got a lower dose accepted by insurance    Adilene Gonzalez NP  ----- Message -----  From: Phyllis Carroll MA  Sent: 9/13/2023   2:23 PM CDT  To: Adilene Gonzalez NP  Subject: FW: RX issue                                     Insurance doesn't want to cover due to dosage.      ----- Message -----  From: Shanell Gilman  Sent: 9/13/2023   1:46 PM CDT  To: Carlos Head Staff  Subject: RX issue                                         Type:  Needs Medical Advice    Who Called: pt    Pharmacy name and phone #:    [facility] [place] [place] - [address] [facility]  [address] [facility] [address]  Phone: [phone] Fax: [phone]      Would the patient rather a call back or a response via [facility]? Call back    Best Call Back Number: [phone]    Additional Information: sts he would like to speak to the Dr regarding a RX oxyCODONE (ROXICODONE) 30 MG Tabmontrellat the pharmacy is having trouble with--please advise and thank you

## 2023-09-14 NOTE — TELEPHONE ENCOUNTER
Cover my meds #[id number]             Approvedtoday  PA Case: [id number], Status: Approved, Coverage Starts on: 1/1/2023 12:00:00 AM, Coverage Ends on: 12/31/2023 12:00:00 AM. Questions? Contact [phone].

## 2023-09-18 ENCOUNTER — TELEPHONE (OUTPATIENT)
Dept: PULMONOLOGY | Facility: CLINIC | Age: 53
End: 2023-09-18
Payer: MEDICARE

## 2023-09-18 NOTE — TELEPHONE ENCOUNTER
----- Message from Wilda Heller sent at 9/18/2023  9:43 AM CDT -----  Type: Needs Medical Advice  Who Called:  Kenny from [facility]  Symptoms (please be specific):  said she need to speak to the dr about the lung test and ODIFY --please call and advise  Best Call Back Number: [phone]  Additional Information: thank you

## 2023-09-27 ENCOUNTER — TELEPHONE (OUTPATIENT)
Dept: PAIN MEDICINE | Facility: CLINIC | Age: 53
End: 2023-09-27
Payer: MEDICARE

## 2023-09-27 ENCOUNTER — TELEPHONE (OUTPATIENT)
Dept: PULMONOLOGY | Facility: CLINIC | Age: 53
End: 2023-09-27
Payer: MEDICARE

## 2023-09-27 NOTE — TELEPHONE ENCOUNTER
Spoke to patient on phone. Patient lost patient assistance form. Needing new copy.     Willing to travel to [place] for navigational bronchoscopy. Message sent to Dr. Jc Liu requesting he take over case.   Anesthesia at [facility] would not sedate for procedure. He has surgery clearance.     Results from Nodify lung are concerning for cancer.     Patient voiced understanding. Will come to clinic tomorrow to fill out [facility] assistance forms.     Adilene Gonzalez NP     ----- Message -----  From: Wilda Heller  Sent: 9/27/2023   2:26 PM CDT  To: Carlos Head Staff    Type: Needs Medical Advice  Who Called:  pt  Symptoms (please be specific):  said he need to speak to the office said he took a cancer test and he need to know the results--said it's been over a week and no one has contacted him--please call and advise-- pt said he in pain when he walk  Best Call Back Number: [phone] (home)     Additional Information: thank you

## 2023-09-27 NOTE — TELEPHONE ENCOUNTER
Sent message to provider to contact pt     ----- Message from Wilda Heller sent at 9/27/2023  2:22 PM CDT -----  Type: Needs Medical Advice  Who Called:  pt  Symptoms (please be specific):  said he need to speak to the office said he took a cancer test and he need to know the results--said it's been over a week and no one has contacted him--please call and advise-- pt said he in pain when he walk  Best Call Back Number: [phone] (home)     Additional Information: thank you

## 2023-09-27 NOTE — TELEPHONE ENCOUNTER
I called [facility] to cancel the appointment with Dr. Wesley on 10-4-23, got voicemail, call back number was provided.

## 2023-09-28 ENCOUNTER — TELEPHONE (OUTPATIENT)
Dept: PULMONOLOGY | Facility: CLINIC | Age: 53
End: 2023-09-28
Payer: MEDICARE

## 2023-09-28 DIAGNOSIS — J44.9 CHRONIC OBSTRUCTIVE PULMONARY DISEASE, UNSPECIFIED COPD TYPE: Primary | ICD-10-CM

## 2023-09-28 RX ORDER — FLUTICASONE FUROATE, UMECLIDINIUM BROMIDE AND VILANTEROL TRIFENATATE 200; 62.5; 25 UG/1; UG/1; UG/1
1 POWDER RESPIRATORY (INHALATION) DAILY
Qty: 60 EACH | Refills: 0 | Status: SHIPPED | OUTPATIENT
Start: 2023-09-28

## 2023-09-28 NOTE — TELEPHONE ENCOUNTER
I tried to call [facility] to cancel the appointment with Dr. Welsey, got voicemail, call back number was provided.

## 2023-09-29 ENCOUNTER — TELEPHONE (OUTPATIENT)
Dept: PULMONOLOGY | Facility: CLINIC | Age: 53
End: 2023-09-29
Payer: MEDICARE

## 2023-10-02 ENCOUNTER — TELEPHONE (OUTPATIENT)
Dept: UROLOGY | Facility: CLINIC | Age: 53
End: 2023-10-02
Payer: MEDICARE

## 2023-10-02 NOTE — TELEPHONE ENCOUNTER
Phoned patient twice no answer no voicemail to leave a message.   Patient needs f/u appointment per pulmonologist

## 2023-10-04 ENCOUNTER — TELEPHONE (OUTPATIENT)
Dept: PULMONOLOGY | Facility: CLINIC | Age: 53
End: 2023-10-04
Payer: MEDICARE

## 2023-10-04 ENCOUNTER — PATIENT MESSAGE (OUTPATIENT)
Dept: PULMONOLOGY | Facility: CLINIC | Age: 53
End: 2023-10-04
Payer: MEDICARE

## 2023-10-04 NOTE — TELEPHONE ENCOUNTER
----- Message from Cari Miranda sent at 10/4/2023  9:52 AM CDT -----  Contact: self  Type: Needs Medical Advice  Who Called:  pt  Best Call Back Number: [phone]   Additional Information: pt would like to speak with the office.just letting you know not sure why but he cancelled all his appts with [facility]

## 2023-10-04 NOTE — TELEPHONE ENCOUNTER
Spoke to patient.  He called to say thank you for all the help.  He cancelled all his appts because he doesn't believe [facility].  Informed patient I'd let you know.

## 2023-10-10 ENCOUNTER — TELEPHONE (OUTPATIENT)
Dept: PULMONOLOGY | Facility: CLINIC | Age: 53
End: 2023-10-10
Payer: MEDICARE

## 2023-10-10 NOTE — TELEPHONE ENCOUNTER
"Called and spoke with pt to offer an appointment with Pulmonary, informed by patient that [facility] does not accept [facility] coverage.  Patient continued conversation with how awful [facility] care is and his inability to get the care at [facility] in [place].  Offered appointment for 10/18 with Dr. Pryor.  Patient requesting that he also has an appointment with pain management, states that is his problem the pain in his lymph nodes, patient also asking for assistance with getting another PCP since his previous PCP "isn't listening to him". Patient asked if there were doctors in [place] that could help him find out what type of cancer he has.  Informed pt that nurse would reach out to staff to assist in locating a pulmonologist in [place].  No appointment made for [facility].  "

## 2023-10-12 ENCOUNTER — TELEPHONE (OUTPATIENT)
Dept: PULMONOLOGY | Facility: CLINIC | Age: 53
End: 2023-10-12
Payer: MEDICARE

## 2023-10-12 DIAGNOSIS — R91.8 MASS OF LEFT LUNG: Primary | ICD-10-CM

## 2023-10-12 NOTE — PROGRESS NOTES
Sent cover letter with External pulmonary referral on 10/12/2023 at 1600 to [facility]  States:     Attn: Dr. Das or Micah Cuevas NP    Patient has abnormal CT chest. Attempt at navigational bronchoscopy complicated by travel to [place]. Awaiting tissue diagnosis for oncology referral. Needing tissue sample.     Call Adilene Gonzalez NP to discuss further.

## 2023-10-12 NOTE — TELEPHONE ENCOUNTER
----- Message from Adilene Gonzalez NP sent at 10/12/2023  3:43 PM CDT -----  Regarding: FW: Referral  Please call Mr. Lyles and let him know I might be able to call the NP at [facility] and help him be seen there.     Adilene Gonzalez NP  ----- Message -----  From: Nancy Valencia RN  Sent: 10/12/2023   2:47 PM CDT  To: Adilene Gonzalez NP  Subject: Referral                                         Hi    If the referral can be placed for Dr. Christian NAJERA he could get in to see the Pulmonologist within a week.  He can only be seen by a pulmonologist in [place].    Dr. Elsa Gonazles-[phone]    Thanks  Nancy Valencia RN

## 2023-10-12 NOTE — TELEPHONE ENCOUNTER
Called and spoke with pt to inform that the [facility] group could see him in [place],  Informed pt that the appointment scheduled for 10/18 with Dr. Pryor will be cancelled due to authorization pending.  Patient verbalized understanding. Message sent to Dr. Gonzalez office requesting a referral for outside pulmonologist.

## 2023-10-12 NOTE — TELEPHONE ENCOUNTER
Placed a call to the pt in regards to MIO Gonzalez in the process of getting his appointment in [place] with the Pulmonologist. Pt verbalized understanding.

## 2023-10-13 ENCOUNTER — TELEPHONE (OUTPATIENT)
Dept: PULMONOLOGY | Facility: CLINIC | Age: 53
End: 2023-10-13
Payer: MEDICARE

## 2023-10-13 NOTE — TELEPHONE ENCOUNTER
----- Message from Cassandra Potter, Patient Care Assistant sent at 10/13/2023 10:02 AM CDT -----  Regarding: advice  Contact: Yamileth with Dr. Stroud  Type: Needs Medical Advice    Who Called:  Yamileth with Dr. Stroud    Best Call Back Number:      Additional Information: Yamileth with Dr. Stroud states she would like a callback regarding an STAT fax that was sent. Please call to advise. Thanks!

## 2023-10-13 NOTE — TELEPHONE ENCOUNTER
Phyllis sp/w Dr Stroud's office and explained they would need to contact [facility] to access to the PET images

## 2023-10-13 NOTE — TELEPHONE ENCOUNTER
Placed a call to [place] with 's office about the STAT fax that was sent. Staff was unavailable and will call back.

## 2023-10-13 NOTE — TELEPHONE ENCOUNTER
----- Message from Francine Layton sent at 10/13/2023  2:07 PM CDT -----  Regarding: Pt Advice  Contact: Pt [phone]  Missed Callback         Pt returning callback from missed call. Requesting to speak with Adilene Gonzalez. Please call [phone].

## 2023-10-17 ENCOUNTER — TELEPHONE (OUTPATIENT)
Dept: PULMONOLOGY | Facility: CLINIC | Age: 53
End: 2023-10-17
Payer: MEDICARE

## 2023-10-17 NOTE — TELEPHONE ENCOUNTER
Called office they stated they have been trying to call regarding getting images powershared. I have them hospital number.     ----- Message from Jose A Augustin sent at 10/17/2023 10:38 AM CDT -----  Regarding: Dr Stroud  Contact: Dr Stroud  Type:  Needs Medical Advice    Who Called: Yamileth reilly/ Dr Stroud        Would the patient rather a call back or a response via [facility]? Call back    Best Call Back Number: [phone]  [facility] [address]    Additional Information: Sts they need to talk to the office ASAP about the pt they have been waiting and have left several messages and to imaging as well.   Please advise -- Thank you

## 2023-10-19 ENCOUNTER — TELEPHONE (OUTPATIENT)
Dept: FAMILY MEDICINE | Facility: CLINIC | Age: 53
End: 2023-10-19
Payer: MEDICARE

## 2023-10-20 ENCOUNTER — TELEPHONE (OUTPATIENT)
Dept: PAIN MEDICINE | Facility: CLINIC | Age: 53
End: 2023-10-20
Payer: MEDICARE

## 2023-10-20 NOTE — TELEPHONE ENCOUNTER
Called patient letting him know that we are interventional pain,patient   stated that he will cancel his appointment and contact his primary Doctor

## 2023-10-23 ENCOUNTER — OFFICE VISIT (OUTPATIENT)
Dept: PULMONOLOGY | Facility: CLINIC | Age: 53
End: 2023-10-23
Payer: MEDICARE

## 2023-10-23 VITALS
DIASTOLIC BLOOD PRESSURE: 80 MMHG | WEIGHT: 267 LBS | HEIGHT: 72 IN | HEART RATE: 63 BPM | BODY MASS INDEX: 36.16 KG/M2 | OXYGEN SATURATION: 98 % | SYSTOLIC BLOOD PRESSURE: 135 MMHG

## 2023-10-23 DIAGNOSIS — R91.1 PULMONARY NODULE 1 CM OR GREATER IN DIAMETER: ICD-10-CM

## 2023-10-23 DIAGNOSIS — M54.41 CHRONIC BILATERAL LOW BACK PAIN WITH BILATERAL SCIATICA: ICD-10-CM

## 2023-10-23 DIAGNOSIS — R91.1 NODULE OF MIDDLE LOBE OF RIGHT LUNG: Primary | ICD-10-CM

## 2023-10-23 DIAGNOSIS — I25.10 CORONARY ARTERY DISEASE, UNSPECIFIED VESSEL OR LESION TYPE, UNSPECIFIED WHETHER ANGINA PRESENT, UNSPECIFIED WHETHER NATIVE OR TRANSPLANTED HEART: ICD-10-CM

## 2023-10-23 DIAGNOSIS — G89.29 CHRONIC BILATERAL LOW BACK PAIN WITH BILATERAL SCIATICA: ICD-10-CM

## 2023-10-23 DIAGNOSIS — M54.42 CHRONIC BILATERAL LOW BACK PAIN WITH BILATERAL SCIATICA: ICD-10-CM

## 2023-10-23 DIAGNOSIS — E66.01 SEVERE OBESITY (BMI 35.0-39.9) WITH COMORBIDITY: ICD-10-CM

## 2023-10-23 PROCEDURE — 3008F PR BODY MASS INDEX (BMI) DOCUMENTED: ICD-10-PCS | Mod: CPTII,S$GLB,, | Performed by: INTERNAL MEDICINE

## 2023-10-23 PROCEDURE — 1159F PR MEDICATION LIST DOCUMENTED IN MEDICAL RECORD: ICD-10-PCS | Mod: CPTII,S$GLB,, | Performed by: INTERNAL MEDICINE

## 2023-10-23 PROCEDURE — 99204 OFFICE O/P NEW MOD 45 MIN: CPT | Mod: S$GLB,,, | Performed by: INTERNAL MEDICINE

## 2023-10-23 PROCEDURE — 3079F DIAST BP 80-89 MM HG: CPT | Mod: CPTII,S$GLB,, | Performed by: INTERNAL MEDICINE

## 2023-10-23 PROCEDURE — 1160F PR REVIEW ALL MEDS BY PRESCRIBER/CLIN PHARMACIST DOCUMENTED: ICD-10-PCS | Mod: CPTII,S$GLB,, | Performed by: INTERNAL MEDICINE

## 2023-10-23 PROCEDURE — 3008F BODY MASS INDEX DOCD: CPT | Mod: CPTII,S$GLB,, | Performed by: INTERNAL MEDICINE

## 2023-10-23 PROCEDURE — 99204 PR OFFICE/OUTPT VISIT, NEW, LEVL IV, 45-59 MIN: ICD-10-PCS | Mod: S$GLB,,, | Performed by: INTERNAL MEDICINE

## 2023-10-23 PROCEDURE — 1160F RVW MEDS BY RX/DR IN RCRD: CPT | Mod: CPTII,S$GLB,, | Performed by: INTERNAL MEDICINE

## 2023-10-23 PROCEDURE — 3075F SYST BP GE 130 - 139MM HG: CPT | Mod: CPTII,S$GLB,, | Performed by: INTERNAL MEDICINE

## 2023-10-23 PROCEDURE — 3079F PR MOST RECENT DIASTOLIC BLOOD PRESSURE 80-89 MM HG: ICD-10-PCS | Mod: CPTII,S$GLB,, | Performed by: INTERNAL MEDICINE

## 2023-10-23 PROCEDURE — 1159F MED LIST DOCD IN RCRD: CPT | Mod: CPTII,S$GLB,, | Performed by: INTERNAL MEDICINE

## 2023-10-23 PROCEDURE — 3075F PR MOST RECENT SYSTOLIC BLOOD PRESS GE 130-139MM HG: ICD-10-PCS | Mod: CPTII,S$GLB,, | Performed by: INTERNAL MEDICINE

## 2023-10-23 RX ORDER — OXYCODONE AND ACETAMINOPHEN 10; 325 MG/1; MG/1
1 TABLET ORAL EVERY 4 HOURS PRN
Qty: 90 TABLET | Refills: 0 | Status: SHIPPED | OUTPATIENT
Start: 2023-10-23 | End: 2023-11-09

## 2023-10-23 RX ORDER — GABAPENTIN 400 MG/1
800 CAPSULE ORAL 3 TIMES DAILY
Qty: 180 CAPSULE | Refills: 0 | Status: SHIPPED | OUTPATIENT
Start: 2023-10-23

## 2023-10-23 NOTE — PROGRESS NOTES
SUBJECTIVE:    Patient ID: Tolu Lyles Sr. is a 53 y.o. male.    Chief Complaint: Establish Care    HPI the patient is a 53-year-old male who presents with a 2.4 x 2.6 right middle lobe hypermetabolic nodule. He is frustrated because he cannot get any pain medications.  He is almost out of his gabapentin. He is still smoking 10-15 a day. He is down from 2.5 ppd. The patient's biggest issue at this moment is his testicular hydrocele.  The patient states he was told by nurse practitioner Carlos that he had widely metastatic cancer.  The patient has had appointments with the [facility] pulmonologists and the [facility] pulmonologists and now he is here.  I can not figure out exactly why.    Past Medical History:   Diagnosis Date    Chronic back pain     COPD (chronic obstructive pulmonary disease)     Coronary artery disease     Diabetes mellitus     Lung nodule     Sleep apnea     Unspecified injury of head, initial encounter 1975     Past Surgical History:   Procedure Laterality Date    CARDIAC SURGERY  2001    CARIDAC STENTS 7 ;  TEXARCANA     Family History   Problem Relation Age of Onset    Diabetes Mother     Hypertension Mother     Heart disease Mother     Stroke Mother     Diabetes Father     Hypertension Father     Heart disease Father     Stroke Father     Cancer Maternal Uncle         Social History:   Marital Status:   Occupation: Data Unavailable  Alcohol History:  reports that he does not currently use alcohol.  Tobacco History:  reports that he has been smoking cigarettes. He started smoking about 50 years ago. He has a 25.4 pack-year smoking history. He has never used smokeless tobacco.  Drug History:  reports no history of drug use.    Review of patient's allergies indicates:   Allergen Reactions    Codeine Swelling, Anaphylaxis, Hives and Other (See Comments)     Throat swell, rash       Current Outpatient Medications   Medication Sig Dispense Refill    albuterol (PROVENTIL) 2.5 mg /3 mL  (0.083 %) nebulizer solution Take 3 mLs (2.5 mg total) by nebulization every 6 (six) hours as needed for Wheezing. Rescue 120 mL 3    ALPRAZolam (XANAX) 0.25 MG tablet Take 1 tablet (0.25 mg total) by mouth 2 (two) times daily as needed for Anxiety. 10 tablet 0    aspirin 500 MG EC tablet Take 500 mg by mouth once daily.      fluticasone-umeclidin-vilanter (TRELEGY ELLIPTA) 200-62.5-25 mcg inhaler Inhale 1 puff into the lungs once daily. 60 each 0    furosemide (LASIX) 40 MG tablet Take 40 mg by mouth once daily.      gabapentin (NEURONTIN) 400 MG capsule Take 2 capsules (800 mg total) by mouth 3 (three) times daily. 180 capsule 0    insulin detemir U-100, Levemir, (LEVEMIR FLEXPEN) 100 unit/mL (3 mL) InPn pen Inject into the skin every meal as needed.      metFORMIN (GLUCOPHAGE) 1000 MG tablet Take 1,000 mg by mouth 2 (two) times daily with meals.      methocarbamoL (ROBAXIN) 750 MG Tab Take 750 mg by mouth 2 (two) times daily.      metoclopramide HCl (REGLAN) 10 MG tablet Take 10 mg by mouth 4 (four) times daily.      omeprazole (PRILOSEC) 40 MG capsule TAKE 1 CAPSULE (40 MG TOTAL) BY MOUTH DAILY.      budesonide-glycopyr-formoterol (BREZTRI AEROSPHERE) 160-9-4.8 mcg/actuation HFAA Inhale 2 puffs into the lungs 2 (two) times a day. (Patient not taking: Reported on 10/23/2023) 10.7 g 11    oxyCODONE-acetaminophen (PERCOCET)  mg per tablet Take 1 tablet by mouth every 4 (four) hours as needed for Pain. 90 tablet 0     No current facility-administered medications for this visit.       Alpha-1 Antitrypsin:  Last PFT:   Last CT:    Review of Systems  General: Feeling terrible because he is in pain  Eyes: Vision is good.  ENT:  No sinusitis or pharyngitis.   Heart:: No chest pain or palpitations.  Lungs: No cough, sputum, or wheezing.  GI: No Nausea, vomiting, constipation, diarrhea, or reflux.  : No dysuria, hesitancy, or nocturia.  Musculoskeletal:  He complains of severe back pain.  Skin: No lesions or  rashes.  Neuro:  He complains of severe neuropathy in his hands and feet.  He takes high-dose gabapentin to help this.  Lymph: No edema or adenopathy.  Psych: No anxiety or depression.  Endo: No weight change.    OBJECTIVE:      /80 (BP Location: Left arm, Patient Position: Sitting, BP Method: Medium (Manual))   Pulse 63   Ht 6' (1.829 m)   Wt 121.1 kg (267 lb)   SpO2 98%   BMI 36.21 kg/m²     Physical Exam  GENERAL: Older appearing patient in no distress.  HEENT: Pupils equal and reactive. Extraocular movements intact. Nose intact.  Pharynx moist.  NECK: Supple.   HEART: Regular rate and rhythm. No murmur or gallop auscultated.  LUNGS: Clear to auscultation and percussion. Lung excursion symmetrical. No change in fremitus. No adventitial noises.  ABDOMEN: Bowel sounds present. Non-tender, no masses palpated.  EXTREMITIES: Normal muscle tone and joint movement, no cyanosis or clubbing.   LYMPHATICS: No adenopathy palpated, no edema.  SKIN: Dry, intact, no lesions.   NEURO: Cranial nerves II-XII intact. Motor strength 5/5 bilaterally, upper and lower extremities.  PSYCH: Appropriate affect.    Assessment:       1. Nodule of middle lobe of right lung    2. Pulmonary nodule 1 cm or greater in diameter    3. Chronic bilateral low back pain with bilateral sciatica    4. Severe obesity (BMI 35.0-39.9) with comorbidity    5. Coronary artery disease, unspecified vessel or lesion type, unspecified whether angina present, unspecified whether native or transplanted heart          Plan:       Nodule of middle lobe of right lung  -     Ambulatory referral/consult to Pulmonology    Pulmonary nodule 1 cm or greater in diameter  -     Complete PFT with bronchodilator; Future  -     Case Request Endoscopy: Bronchoscopy    Chronic bilateral low back pain with bilateral sciatica  -     gabapentin (NEURONTIN) 400 MG capsule; Take 2 capsules (800 mg total) by mouth 3 (three) times daily.  Dispense: 180 capsule; Refill: 0  -      oxyCODONE-acetaminophen (PERCOCET)  mg per tablet; Take 1 tablet by mouth every 4 (four) hours as needed for Pain.  Dispense: 90 tablet; Refill: 0    Severe obesity (BMI 35.0-39.9) with comorbidity    Coronary artery disease, unspecified vessel or lesion type, unspecified whether angina present, unspecified whether native or transplanted heart         No follow-ups on file.  Will proceed with bronchoscopy on Wednesday.  Patient needs to stop smoking.  This is been extensively discussed.  Pain meds filled x1 as the patient appears to be quite desperate for them

## 2023-10-25 ENCOUNTER — ANESTHESIA (OUTPATIENT)
Dept: SURGERY | Facility: HOSPITAL | Age: 53
End: 2023-10-25
Payer: MEDICARE

## 2023-10-25 ENCOUNTER — ANESTHESIA EVENT (OUTPATIENT)
Dept: SURGERY | Facility: HOSPITAL | Age: 53
End: 2023-10-25
Payer: MEDICARE

## 2023-10-25 ENCOUNTER — HOSPITAL ENCOUNTER (OUTPATIENT)
Facility: HOSPITAL | Age: 53
Discharge: HOME | End: 2023-10-25
Attending: INTERNAL MEDICINE | Admitting: INTERNAL MEDICINE
Payer: MEDICARE

## 2023-10-25 VITALS
DIASTOLIC BLOOD PRESSURE: 67 MMHG | RESPIRATION RATE: 17 BRPM | HEART RATE: 62 BPM | SYSTOLIC BLOOD PRESSURE: 141 MMHG | TEMPERATURE: 98 F | OXYGEN SATURATION: 96 %

## 2023-10-25 DIAGNOSIS — R91.1 PULMONARY NODULE 1 CM OR GREATER IN DIAMETER: Primary | ICD-10-CM

## 2023-10-25 DIAGNOSIS — R52 PAIN: ICD-10-CM

## 2023-10-25 LAB
ALBUMIN SERPL BCP-MCNC: 4 G/DL (ref 3.5–5.2)
ALP SERPL-CCNC: 59 U/L (ref 55–135)
ALT SERPL W/O P-5'-P-CCNC: 11 U/L (ref 10–44)
ANION GAP SERPL CALC-SCNC: 5 MMOL/L (ref 8–16)
AST SERPL-CCNC: 14 U/L (ref 10–40)
BILIRUB SERPL-MCNC: 0.6 MG/DL (ref 0.1–1)
BUN SERPL-MCNC: 22 MG/DL (ref 6–20)
CALCIUM SERPL-MCNC: 9.2 MG/DL (ref 8.7–10.5)
CHLORIDE SERPL-SCNC: 104 MMOL/L (ref 95–110)
CO2 SERPL-SCNC: 30 MMOL/L (ref 23–29)
CREAT SERPL-MCNC: 1.2 MG/DL (ref 0.5–1.4)
ERYTHROCYTE [DISTWIDTH] IN BLOOD BY AUTOMATED COUNT: 13 % (ref 11.5–14.5)
EST. GFR  (NO RACE VARIABLE): >60 ML/MIN/1.73 M^2
GLUCOSE SERPL-MCNC: 156 MG/DL (ref 70–110)
HCT VFR BLD AUTO: 48.3 % (ref 40–54)
HGB BLD-MCNC: 16.5 G/DL (ref 14–18)
MCH RBC QN AUTO: 31.3 PG (ref 27–31)
MCHC RBC AUTO-ENTMCNC: 34.2 G/DL (ref 32–36)
MCV RBC AUTO: 92 FL (ref 82–98)
PLATELET # BLD AUTO: 206 K/UL (ref 150–450)
PMV BLD AUTO: 10.3 FL (ref 9.2–12.9)
POTASSIUM SERPL-SCNC: 4 MMOL/L (ref 3.5–5.1)
PROT SERPL-MCNC: 6.6 G/DL (ref 6–8.4)
RBC # BLD AUTO: 5.28 M/UL (ref 4.6–6.2)
SODIUM SERPL-SCNC: 139 MMOL/L (ref 136–145)
WBC # BLD AUTO: 9.22 K/UL (ref 3.9–12.7)

## 2023-10-25 PROCEDURE — 88360 TUMOR IMMUNOHISTOCHEM/MANUAL: CPT | Mod: TC | Performed by: PATHOLOGY

## 2023-10-25 PROCEDURE — 85027 COMPLETE CBC AUTOMATED: CPT | Performed by: ANESTHESIOLOGY

## 2023-10-25 PROCEDURE — 63600175 PHARM REV CODE 636 W HCPCS: Performed by: NURSE ANESTHETIST, CERTIFIED REGISTERED

## 2023-10-25 PROCEDURE — D9220A PRA ANESTHESIA: Mod: CRNA,,, | Performed by: NURSE ANESTHETIST, CERTIFIED REGISTERED

## 2023-10-25 PROCEDURE — 31623 PR BRONCHOSCOPY,DIAGNOSTIC W BRUSH: ICD-10-PCS | Mod: 51,,, | Performed by: INTERNAL MEDICINE

## 2023-10-25 PROCEDURE — D9220A PRA ANESTHESIA: ICD-10-PCS | Mod: CRNA,,, | Performed by: NURSE ANESTHETIST, CERTIFIED REGISTERED

## 2023-10-25 PROCEDURE — 88305 TISSUE EXAM BY PATHOLOGIST: CPT | Mod: TC | Performed by: PATHOLOGY

## 2023-10-25 PROCEDURE — 31623 DX BRONCHOSCOPE/BRUSH: CPT | Performed by: INTERNAL MEDICINE

## 2023-10-25 PROCEDURE — 27200944 HC BRONCH FORCEPS DISPOSABLE: Performed by: INTERNAL MEDICINE

## 2023-10-25 PROCEDURE — 37000009 HC ANESTHESIA EA ADD 15 MINS: Performed by: INTERNAL MEDICINE

## 2023-10-25 PROCEDURE — 31624 DX BRONCHOSCOPE/LAVAGE: CPT | Performed by: INTERNAL MEDICINE

## 2023-10-25 PROCEDURE — 25000003 PHARM REV CODE 250: Performed by: INTERNAL MEDICINE

## 2023-10-25 PROCEDURE — D9220A PRA ANESTHESIA: Mod: ANES,,, | Performed by: ANESTHESIOLOGY

## 2023-10-25 PROCEDURE — 31623 DX BRONCHOSCOPE/BRUSH: CPT | Mod: 51,,, | Performed by: INTERNAL MEDICINE

## 2023-10-25 PROCEDURE — 31628 BRONCHOSCOPY/LUNG BX EACH: CPT | Mod: RT,,, | Performed by: INTERNAL MEDICINE

## 2023-10-25 PROCEDURE — 80053 COMPREHEN METABOLIC PANEL: CPT | Performed by: ANESTHESIOLOGY

## 2023-10-25 PROCEDURE — 31624 DX BRONCHOSCOPE/LAVAGE: CPT | Mod: 51,,, | Performed by: INTERNAL MEDICINE

## 2023-10-25 PROCEDURE — D9220A PRA ANESTHESIA: ICD-10-PCS | Mod: ANES,,, | Performed by: ANESTHESIOLOGY

## 2023-10-25 PROCEDURE — 31628 PR BRONCHOSCOPY,TRANSBRONCH BIOPSY: ICD-10-PCS | Mod: RT,,, | Performed by: INTERNAL MEDICINE

## 2023-10-25 PROCEDURE — 37000008 HC ANESTHESIA 1ST 15 MINUTES: Performed by: INTERNAL MEDICINE

## 2023-10-25 PROCEDURE — 25000242 PHARM REV CODE 250 ALT 637 W/ HCPCS: Performed by: INTERNAL MEDICINE

## 2023-10-25 PROCEDURE — 31624 PR BRONCHOSCOPY,DIAG2STIC W LAVAGE: ICD-10-PCS | Mod: 51,,, | Performed by: INTERNAL MEDICINE

## 2023-10-25 PROCEDURE — 27201114 HC TRAP (ANY): Performed by: INTERNAL MEDICINE

## 2023-10-25 PROCEDURE — 31628 BRONCHOSCOPY/LUNG BX EACH: CPT | Performed by: INTERNAL MEDICINE

## 2023-10-25 PROCEDURE — 27200946 HC BRUSH, CYTOLOGY: Performed by: INTERNAL MEDICINE

## 2023-10-25 RX ORDER — LIDOCAINE HYDROCHLORIDE 40 MG/ML
10 SOLUTION TOPICAL ONCE
Status: CANCELLED | OUTPATIENT
Start: 2023-10-25 | End: 2023-10-25

## 2023-10-25 RX ORDER — PROPOFOL 10 MG/ML
VIAL (ML) INTRAVENOUS CONTINUOUS PRN
Status: DISCONTINUED | OUTPATIENT
Start: 2023-10-25 | End: 2023-10-25

## 2023-10-25 RX ORDER — ONDANSETRON 2 MG/ML
INJECTION INTRAMUSCULAR; INTRAVENOUS
Status: DISCONTINUED | OUTPATIENT
Start: 2023-10-25 | End: 2023-10-25

## 2023-10-25 RX ORDER — LIDOCAINE HYDROCHLORIDE 40 MG/ML
5 INJECTION, SOLUTION RETROBULBAR ONCE
Status: COMPLETED | OUTPATIENT
Start: 2023-10-25 | End: 2023-10-25

## 2023-10-25 RX ORDER — FENTANYL CITRATE 50 UG/ML
INJECTION, SOLUTION INTRAMUSCULAR; INTRAVENOUS
Status: DISCONTINUED | OUTPATIENT
Start: 2023-10-25 | End: 2023-10-25

## 2023-10-25 RX ORDER — ALBUTEROL SULFATE 0.83 MG/ML
2.5 SOLUTION RESPIRATORY (INHALATION) ONCE
Status: COMPLETED | OUTPATIENT
Start: 2023-10-25 | End: 2023-10-25

## 2023-10-25 RX ORDER — LIDOCAINE HYDROCHLORIDE 10 MG/ML
20 INJECTION INFILTRATION; PERINEURAL ONCE
Status: COMPLETED | OUTPATIENT
Start: 2023-10-25 | End: 2023-10-25

## 2023-10-25 RX ORDER — LIDOCAINE HYDROCHLORIDE 20 MG/ML
JELLY TOPICAL
Status: COMPLETED | OUTPATIENT
Start: 2023-10-25 | End: 2023-10-25

## 2023-10-25 RX ADMIN — SODIUM CHLORIDE, SODIUM LACTATE, POTASSIUM CHLORIDE, AND CALCIUM CHLORIDE: .6; .31; .03; .02 INJECTION, SOLUTION INTRAVENOUS at 10:10

## 2023-10-25 RX ADMIN — PROPOFOL 100 MCG/KG/MIN: 10 INJECTION, EMULSION INTRAVENOUS at 10:10

## 2023-10-25 RX ADMIN — FENTANYL CITRATE 100 MCG: 50 INJECTION, SOLUTION INTRAMUSCULAR; INTRAVENOUS at 10:10

## 2023-10-25 RX ADMIN — ONDANSETRON 4 MG: 2 INJECTION INTRAMUSCULAR; INTRAVENOUS at 10:10

## 2023-10-25 NOTE — H&P
SUBJECTIVE:    Patient ID: Tolu Lyles Sr. is a 53 y.o. male.    Chief Complaint: No chief complaint on file.    HPI the patient is a 53-year-old male who presents with a 2.4 x 2.6 right middle lobe hypermetabolic nodule. He is frustrated because he cannot get any pain medications.  He is almost out of his gabapentin. He is still smoking 10-15 a day. He is down from 2.5 ppd. The patient's biggest issue at this moment is his testicular hydrocele.  The patient states he was told by nurse practitioner Carlos that he had widely metastatic cancer.  The patient has had appointments with the [facility] pulmonologists and the [facility] pulmonologists and now he is here.  I can not figure out exactly why.    Past Medical History:   Diagnosis Date    Chronic back pain     COPD (chronic obstructive pulmonary disease)     Coronary artery disease     Diabetes mellitus     Lung nodule     Sleep apnea     Unspecified injury of head, initial encounter 1975     Past Surgical History:   Procedure Laterality Date    CARDIAC SURGERY  2001    CARIDAC STENTS 7 ;  TEXARCANA     Family History   Problem Relation Age of Onset    Diabetes Mother     Hypertension Mother     Heart disease Mother     Stroke Mother     Diabetes Father     Hypertension Father     Heart disease Father     Stroke Father     Cancer Maternal Uncle         Social History:   Marital Status:   Occupation: Data Unavailable  Alcohol History:  reports that he does not currently use alcohol.  Tobacco History:  reports that he has been smoking cigarettes. He started smoking about 50 years ago. He has a 25.4 pack-year smoking history. He has never used smokeless tobacco.  Drug History:  reports no history of drug use.    Review of patient's allergies indicates:   Allergen Reactions    Codeine Swelling, Anaphylaxis, Hives and Other (See Comments)     Throat swell, rash       Current Facility-Administered Medications   Medication Dose Route Frequency Provider Last Rate  Last Admin    albuterol nebulizer solution 2.5 mg  2.5 mg Nebulization Once Alice Holman MD        LIDOcaine (PF) 40 mg/mL (4 %) injection 5 mL  5 mL Nebulization Once Alice Holman MD        LIDOcaine HCL 10 mg/ml (1%) injection 20 mL  20 mL Other Once Alice Holman MD        LIDOcaine HCL 2% jelly   Topical (Top) PRN Alice Holman MD           Alpha-1 Antitrypsin:  Last PFT:   Last CT:    Review of Systems  General: Feeling terrible because he is in pain  Eyes: Vision is good.  ENT:  No sinusitis or pharyngitis.   Heart:: No chest pain or palpitations.  Lungs: No cough, sputum, or wheezing.  GI: No Nausea, vomiting, constipation, diarrhea, or reflux.  : No dysuria, hesitancy, or nocturia.  Musculoskeletal:  He complains of severe back pain.  Skin: No lesions or rashes.  Neuro:  He complains of severe neuropathy in his hands and feet.  He takes high-dose gabapentin to help this.  Lymph: No edema or adenopathy.  Psych: No anxiety or depression.  Endo: No weight change.    OBJECTIVE:      There were no vitals taken for this visit.    Physical Exam  GENERAL: Older appearing patient in no distress.  HEENT: Pupils equal and reactive. Extraocular movements intact. Nose intact.  Pharynx moist.  NECK: Supple.   HEART: Regular rate and rhythm. No murmur or gallop auscultated.  LUNGS: Clear to auscultation and percussion. Lung excursion symmetrical. No change in fremitus. No adventitial noises.  ABDOMEN: Bowel sounds present. Non-tender, no masses palpated.  EXTREMITIES: Normal muscle tone and joint movement, no cyanosis or clubbing.   LYMPHATICS: No adenopathy palpated, no edema.  SKIN: Dry, intact, no lesions.   NEURO: Cranial nerves II-XII intact. Motor strength 5/5 bilaterally, upper and lower extremities.  PSYCH: Appropriate affect.    Assessment:       1. Pain          Plan:       Pain  -     FL Flouro Usage; Standing  -     FL Flouro Usage    Other orders  -     Place in Outpatient; Standing  -      Cancel: Full code; Standing  -     albuterol nebulizer solution 2.5 mg  -     LIDOcaine HCL 2% jelly  -     LIDOcaine HCL 10 mg/ml (1%) injection 20 mL  -     Vital signs per unit routine; Standing  -     Verify informed consent; Standing  -     Assess per unit routine; Standing  -     Insert peripheral IV if not present; Standing  -     Remove glasses and/or dentures; Standing  -     Discontinue IV; Standing  -     Discharge instructions; Standing  -     LIDOcaine (PF) 40 mg/mL (4 %) injection 5 mL  -     Inhalation Treatment Once; Standing  -     Full code; Standing  -     LIDOcaine HCL 4% external solution 10 mL  -     Cytology Specimen-Medical Cytology (Fluid/Wash/Brush); Standing  -     AFB Culture & Smear; Standing  -     KOH prep; Standing  -     Fungus culture; Standing  -     AFB Culture & Smear; Standing  -     Fungus culture; Standing  -     KOH prep; Standing  -     Tissue Specimen To Pathology, Cardiology/Pulmonary; Standing  -     FL Less Than 1 Hour; Standing  -     Culture, Respiratory; Standing  -     Gram stain; Standing  -     AFB Culture & Smear; Standing  -     Fungus culture; Standing  -     KOH prep; Standing  -     Cytology Specimen-Medical Cytology (Fluid/Wash/Brush); Standing  -     X-Ray Chest AP Single View; Standing  -     Place in Outpatient; Standing         No follow-ups on file.

## 2023-10-25 NOTE — ANESTHESIA PREPROCEDURE EVALUATION
"                                                                                                             10/25/2023  Tolu Lyles Sr. is a 53 y.o., male.      Patient Active Problem List   Diagnosis    BMI 35.0-35.9,adult    Pulmonary nodule 1 cm or greater in diameter    Chronic bilateral low back pain with bilateral sciatica    Severe obesity (BMI 35.0-39.9) with comorbidity    Coronary artery disease       Past Surgical History:   Procedure Laterality Date    CARDIAC SURGERY  2001    CARIDAC STENTS 7 ;  TEXARCANA        Tobacco Use:  The patient  reports that he has been smoking cigarettes. He started smoking about 50 years ago. He has a 25.4 pack-year smoking history. He has never used smokeless tobacco.     Results for orders placed or performed in visit on 08/23/23   IN OFFICE EKG 12-LEAD (to [facility])    Collection Time: 08/23/23  3:35 PM    Narrative    Test Reason : Z01.818,    Vent. Rate : 072 BPM     Atrial Rate : 072 BPM     P-R Int : 134 ms          QRS Dur : 092 ms      QT Int : 370 ms       P-R-T Axes : 045 109 067 degrees     QTc Int : 405 ms    Normal sinus rhythm  Rightward axis  Borderline Abnormal ECG  No previous ECGs available  Confirmed by Rebecca ESCALANTE, Alvin KRAUSE ([id number]) on 9/19/2023 8:12:07 PM    Referred By:             Confirmed By:Alvin Fernandez MD             No results found for: "WBC", "HGB", "HCT", "MCV", "PLT"  BMP  No results found for: "NA", "K", "CL", "CO2", "BUN", "CREATININE", "CALCIUM", "ANIONGAP", "GLU"    No results found for this or any previous visit.              Pre-op Assessment    I have reviewed the Patient Summary Reports.     I have reviewed the Nursing Notes. I have reviewed the NPO Status.   I have reviewed the Medications.     Review of Systems  Anesthesia Hx:  No problems with previous Anesthesia  History of prior surgery of interest to airway management or planning: heart surgery. Denies Family Hx of Anesthesia complications.   Denies Personal Hx of " Anesthesia complications.   Social:  Smoker, No Alcohol Use    Hematology/Oncology:  Hematology Normal   Oncology Normal     EENT/Dental:EENT/Dental Normal   Cardiovascular:   CAD asymptomatic CABG/stent (hx of stents place 15 y ago and again 10 y ago, no records)  ECG has been reviewed. Patient not followed by Cardiologist at this time    Pulmonary:   COPD, mild Sleep Apnea Lung nodule    QOD inhaler use (with exertion)    No Nebulizer use or Home Oxygen use     Patient without C PAP use post 200 lb wt loss   Education provided regarding risk of obstructive sleep apnea     Renal/:   Hx CESIA    Hepatic/GI:   GERD, well controlled    Musculoskeletal:   Bilateral sciatic nerve pain  Spine Disorders: (chronic intermittent low back pain with occ bilat leg symptoms) lumbar and cervical Degenerative disease and Chronic Pain    Neurological:   Neuromuscular Disease, (bilat sciatica symptoms)    Endocrine:   Diabetes, type 2  Obesity / BMI > 30  Dermatological:  Skin Normal    Psych:  Psychiatric Normal           Physical Exam  General: Well nourished, Cooperative, Alert and Oriented    Airway:  Mallampati: III / II  Mouth Opening: Normal  TM Distance: Normal  Tongue: Normal  Neck ROM: Normal ROM    Dental:  Caps / Implants, Intact    Chest/Lungs:  Normal Respiratory Rate  inspiratory wheezes    Heart:  Rate: Normal  Rhythm: Regular Rhythm  Sounds: Normal    Abdomen:  Normal, Soft, Nontender        Anesthesia Plan  Type of Anesthesia, risks & benefits discussed:    Anesthesia Type: Gen Natural Airway  Intra-op Monitoring Plan: Standard ASA Monitors  Post Op Pain Control Plan:   (medical reason for not using multimodal pain management)  Induction:  IV  Informed Consent: Informed consent signed with the Patient and all parties understand the risks and agree with anesthesia plan.  All questions answered. Patient consented to blood products? No  ASA Score: 3 Emergent  Anesthesia Plan Notes:       Check pending labs   General  Natural Airway  POM  Propofol  Zofran    Ready For Surgery From Anesthesia Perspective.     .

## 2023-10-25 NOTE — ANESTHESIA POSTPROCEDURE EVALUATION
Anesthesia Post Evaluation    Patient: Tolu Lyles     Procedure(s) Performed: Procedure(s) (LRB):  BRONCHOSCOPY, WITH FLUOROSCOPY (N/A)    Final Anesthesia Type: general      Patient location during evaluation: GI PACU  Patient participation: Yes- Able to Participate  Level of consciousness: awake and alert, oriented and awake  Post-procedure vital signs: reviewed and stable  Pain management: adequate  Airway patency: patent    PONV status at discharge: No PONV  Anesthetic complications: no      Cardiovascular status: blood pressure returned to baseline, hemodynamically stable and stable  Respiratory status: unassisted, spontaneous ventilation and room air  Hydration status: euvolemic  Follow-up not needed.          Vitals Value Taken Time   /70 10/25/23 1131   Temp 98.0 10/25/23 1136   Pulse 81 10/25/23 1133   Resp 22 10/25/23 1136   SpO2 96 % 10/25/23 1133   Vitals shown include unvalidated device data.      No case tracking events are documented in the log.      Pain/Mellisa Score: No data recorded

## 2023-10-25 NOTE — PROCEDURES
Bronchoscopy    Indication:  Right middle lobe PET hot nodule  Medications:  Per anesthesia  Specimens:  Bronchial lavage, transbronchial brushes, transbronchial lung tissue biopsy  Complications:  None    The bronchoscope was introduced through the oral pharynx as neither nostril would allow passage of the bronchoscope.. The hypopharynx and larynx were unremarkable. The vocal cords were midline and opposed well. The trachea was midline. The aubrie was sharp. The right upper lobe, right middle lobe, and right lower lobe subdivided into the usual subsegments. There were no endobronchial or submucosal abnormalities noted. The left upper lobe and left lower lobe subdivided into the usual subsegments. There were no endobronchial or submucosal abnormalities noted.  Bronchoscope could not be introduced into the right middle lobe as the orifice was too small.  The tumor could not be seen from the orifice of the right middle lobe.  Lavage was taken of the right middle lobe.  180 cc were instilled and 25 were returned.  This was followed by 2 transbronchial brushes and 10 transbronchial lung tissue biopsies.  The specimens were sent for cytology and histopathology. A post procedure x-ray has been ordered. The patient tolerated the procedure well.

## 2023-10-25 NOTE — TRANSFER OF CARE
Anesthesia Transfer of Care Note    Patient: Tolu Lyles     Procedure(s) Performed: Procedure(s) (LRB):  BRONCHOSCOPY, WITH FLUOROSCOPY (N/A)    Patient location: GI    Anesthesia Type: general    Transport from OR: Transported from OR on 2-3 L/min O2 by NC with adequate spontaneous ventilation    Post pain: adequate analgesia    Post assessment: no apparent anesthetic complications    Post vital signs: stable    Level of consciousness: awake and alert    Nausea/Vomiting: no nausea/vomiting    Complications: none    Transfer of care protocol was followed      Last vitals:   Visit Vitals  /63 (BP Location: Left arm, Patient Position: Lying)   Pulse 64   Temp 36.9 °C (98.5 °F) (Oral)   Resp 16   SpO2 96%

## 2023-10-30 ENCOUNTER — TELEPHONE (OUTPATIENT)
Dept: PULMONOLOGY | Facility: HOSPITAL | Age: 53
End: 2023-10-30

## 2023-11-01 ENCOUNTER — TELEPHONE (OUTPATIENT)
Dept: PULMONOLOGY | Facility: HOSPITAL | Age: 53
End: 2023-11-01

## 2023-11-01 NOTE — TELEPHONE ENCOUNTER
Spoke with patient about his diagnosis of a carcinoid.  Hopefully this is resectable.  There is no evidence of metastatic disease on his PET scan.  The patient did not get his pulmonary function studies done.  Explained that this is necessary before there is consideration of a right middle lobe lobectomy.  Gave him scheduling's phone number.

## 2023-11-02 ENCOUNTER — TELEPHONE (OUTPATIENT)
Dept: UROLOGY | Facility: CLINIC | Age: 53
End: 2023-11-02

## 2023-11-02 ENCOUNTER — LAB VISIT (OUTPATIENT)
Dept: LAB | Facility: HOSPITAL | Age: 53
End: 2023-11-02
Attending: UROLOGY
Payer: MEDICARE

## 2023-11-02 DIAGNOSIS — N43.3 HYDROCELE, UNSPECIFIED HYDROCELE TYPE: ICD-10-CM

## 2023-11-02 LAB
COMPLEXED PSA SERPL-MCNC: 0.78 NG/ML (ref 0–4)
ESTIMATED AVG GLUCOSE: 160 MG/DL (ref 68–131)
HBA1C MFR BLD: 7.2 % (ref 4.5–6.2)

## 2023-11-02 PROCEDURE — 84153 ASSAY OF PSA TOTAL: CPT | Mod: GA | Performed by: UROLOGY

## 2023-11-02 PROCEDURE — 83036 HEMOGLOBIN GLYCOSYLATED A1C: CPT | Mod: GA | Performed by: UROLOGY

## 2023-11-02 NOTE — TELEPHONE ENCOUNTER
Spoke with patient regarding appointment today with  tried assisting patient with scheduling appointment today with  at 1:15pm with hemoglobin a 1c prior to appointment. Patient states he will not be coming to appointment just to pay a copay to discuss surgery. He will find another doctor. All appointments canceled. Patient was under the impression he will have procedure done today

## 2023-11-06 ENCOUNTER — HOSPITAL ENCOUNTER (EMERGENCY)
Facility: HOSPITAL | Age: 53
Discharge: LEFT BEFORE DISCHARGE | End: 2023-11-06
Payer: MEDICARE

## 2023-11-06 VITALS
RESPIRATION RATE: 18 BRPM | OXYGEN SATURATION: 97 % | WEIGHT: 267 LBS | HEIGHT: 72 IN | BODY MASS INDEX: 36.16 KG/M2 | TEMPERATURE: 98 F | DIASTOLIC BLOOD PRESSURE: 66 MMHG | HEART RATE: 79 BPM | SYSTOLIC BLOOD PRESSURE: 139 MMHG

## 2023-11-06 DIAGNOSIS — R07.9 CHEST PAIN: ICD-10-CM

## 2023-11-06 LAB
ALBUMIN SERPL BCP-MCNC: 3.8 G/DL (ref 3.5–5.2)
ALP SERPL-CCNC: 62 U/L (ref 55–135)
ALT SERPL W/O P-5'-P-CCNC: 14 U/L (ref 10–44)
ANION GAP SERPL CALC-SCNC: 11 MMOL/L (ref 8–16)
AST SERPL-CCNC: 10 U/L (ref 10–40)
BASOPHILS # BLD AUTO: 0.06 K/UL (ref 0–0.2)
BASOPHILS NFR BLD: 0.6 % (ref 0–1.9)
BILIRUB SERPL-MCNC: 0.6 MG/DL (ref 0.1–1)
BNP SERPL-MCNC: 10 PG/ML (ref 0–99)
BUN SERPL-MCNC: 26 MG/DL (ref 6–20)
CALCIUM SERPL-MCNC: 9.3 MG/DL (ref 8.7–10.5)
CHLORIDE SERPL-SCNC: 101 MMOL/L (ref 95–110)
CO2 SERPL-SCNC: 28 MMOL/L (ref 23–29)
CREAT SERPL-MCNC: 1.4 MG/DL (ref 0.5–1.4)
DIFFERENTIAL METHOD: ABNORMAL
EOSINOPHIL # BLD AUTO: 0.2 K/UL (ref 0–0.5)
EOSINOPHIL NFR BLD: 1.8 % (ref 0–8)
ERYTHROCYTE [DISTWIDTH] IN BLOOD BY AUTOMATED COUNT: 12.7 % (ref 11.5–14.5)
EST. GFR  (NO RACE VARIABLE): 59 ML/MIN/1.73 M^2
GLUCOSE SERPL-MCNC: 143 MG/DL (ref 70–110)
HCT VFR BLD AUTO: 46.1 % (ref 40–54)
HGB BLD-MCNC: 15.3 G/DL (ref 14–18)
IMM GRANULOCYTES # BLD AUTO: 0.05 K/UL (ref 0–0.04)
IMM GRANULOCYTES NFR BLD AUTO: 0.5 % (ref 0–0.5)
LYMPHOCYTES # BLD AUTO: 2.7 K/UL (ref 1–4.8)
LYMPHOCYTES NFR BLD: 25.4 % (ref 18–48)
MCH RBC QN AUTO: 30.2 PG (ref 27–31)
MCHC RBC AUTO-ENTMCNC: 33.2 G/DL (ref 32–36)
MCV RBC AUTO: 91 FL (ref 82–98)
MONOCYTES # BLD AUTO: 0.7 K/UL (ref 0.3–1)
MONOCYTES NFR BLD: 6.6 % (ref 4–15)
NEUTROPHILS # BLD AUTO: 7 K/UL (ref 1.8–7.7)
NEUTROPHILS NFR BLD: 65.1 % (ref 38–73)
NRBC BLD-RTO: 0 /100 WBC
PLATELET # BLD AUTO: 208 K/UL (ref 150–450)
PMV BLD AUTO: 10.5 FL (ref 9.2–12.9)
POTASSIUM SERPL-SCNC: 4.2 MMOL/L (ref 3.5–5.1)
PROT SERPL-MCNC: 6.6 G/DL (ref 6–8.4)
RBC # BLD AUTO: 5.06 M/UL (ref 4.6–6.2)
SODIUM SERPL-SCNC: 140 MMOL/L (ref 136–145)
TROPONIN I SERPL DL<=0.01 NG/ML-MCNC: 0.01 NG/ML (ref 0–0.03)
WBC # BLD AUTO: 10.68 K/UL (ref 3.9–12.7)

## 2023-11-06 PROCEDURE — 83880 ASSAY OF NATRIURETIC PEPTIDE: CPT | Performed by: PHYSICIAN ASSISTANT

## 2023-11-06 PROCEDURE — 84484 ASSAY OF TROPONIN QUANT: CPT | Performed by: PHYSICIAN ASSISTANT

## 2023-11-06 PROCEDURE — 93010 EKG 12-LEAD: ICD-10-PCS | Mod: ,,, | Performed by: INTERNAL MEDICINE

## 2023-11-06 PROCEDURE — 93005 ELECTROCARDIOGRAM TRACING: CPT

## 2023-11-06 PROCEDURE — 80053 COMPREHEN METABOLIC PANEL: CPT | Performed by: PHYSICIAN ASSISTANT

## 2023-11-06 PROCEDURE — 99281 EMR DPT VST MAYX REQ PHY/QHP: CPT | Mod: 25

## 2023-11-06 PROCEDURE — 85025 COMPLETE CBC W/AUTO DIFF WBC: CPT | Performed by: PHYSICIAN ASSISTANT

## 2023-11-06 PROCEDURE — 36415 COLL VENOUS BLD VENIPUNCTURE: CPT | Performed by: PHYSICIAN ASSISTANT

## 2023-11-06 PROCEDURE — 93010 ELECTROCARDIOGRAM REPORT: CPT | Mod: ,,, | Performed by: INTERNAL MEDICINE

## 2023-11-06 NOTE — FIRST PROVIDER EVALUATION
Emergency Department TeleTriage Encounter Note      CHIEF COMPLAINT    Chief Complaint   Patient presents with    Chest Pain     Started last night / ran out of nitro       VITAL SIGNS   Initial Vitals [11/06/23 1524]   BP Pulse Resp Temp SpO2   139/66 79 18 98 °F (36.7 °C) 97 %      MAP       --            ALLERGIES    Review of patient's allergies indicates:   Allergen Reactions    Codeine Swelling, Anaphylaxis, Hives and Other (See Comments)     Throat swell, rash       PROVIDER TRIAGE NOTE  Patient presents with complaint of chest pain that is progressively worsened over the last few months.  He reports associated shortness of breath.  He also reports chronic swelling to the right testicle.      Phy:   Constitutional: well nourished, well developed, appearing stated age, NAD        Initial orders will be placed and care will be transferred to an alternate provider when patient is roomed for a full evaluation. Any additional orders and the final disposition will be determined by that provider.        ORDERS  Labs Reviewed - No data to display    ED Orders (720h ago, onward)      Start Ordered     Status Ordering Provider    11/06/23 1527 11/06/23 1527  EKG 12-lead  Once         Ordered AMERICA MERIDA              Virtual Visit Note: The provider triage portion of this emergency department evaluation and documentation was performed via [employer], a HIPAA-compliant telemedicine application, in concert with a tele-presenter in the room. A face to face patient evaluation with one of my colleagues will occur once the patient is placed in an emergency department room.      DISCLAIMER: This note was prepared with [employer] voice recognition transcription software. Garbled syntax, mangled pronouns, and other bizarre constructions may be attributed to that software system.

## 2023-11-09 ENCOUNTER — HOSPITAL ENCOUNTER (OUTPATIENT)
Facility: HOSPITAL | Age: 53
Discharge: LEFT BEFORE DISCHARGE | End: 2023-11-10
Attending: STUDENT IN AN ORGANIZED HEALTH CARE EDUCATION/TRAINING PROGRAM | Admitting: INTERNAL MEDICINE
Payer: MEDICARE

## 2023-11-09 ENCOUNTER — OFFICE VISIT (OUTPATIENT)
Dept: UROLOGY | Facility: CLINIC | Age: 53
End: 2023-11-09
Payer: MEDICARE

## 2023-11-09 DIAGNOSIS — R07.9 CHEST PAIN: Primary | ICD-10-CM

## 2023-11-09 DIAGNOSIS — N43.3 HYDROCELE, UNSPECIFIED HYDROCELE TYPE: Primary | ICD-10-CM

## 2023-11-09 DIAGNOSIS — N50.89 TESTICULAR SWELLING: ICD-10-CM

## 2023-11-09 PROBLEM — E11.9 TYPE 2 DIABETES MELLITUS, WITHOUT LONG-TERM CURRENT USE OF INSULIN: Status: ACTIVE | Noted: 2023-11-09

## 2023-11-09 LAB
ALBUMIN SERPL BCP-MCNC: 3.8 G/DL (ref 3.5–5.2)
ALP SERPL-CCNC: 65 U/L (ref 55–135)
ALT SERPL W/O P-5'-P-CCNC: 16 U/L (ref 10–44)
ANION GAP SERPL CALC-SCNC: 10 MMOL/L (ref 8–16)
AST SERPL-CCNC: 12 U/L (ref 10–40)
BASOPHILS # BLD AUTO: 0.06 K/UL (ref 0–0.2)
BASOPHILS NFR BLD: 0.6 % (ref 0–1.9)
BILIRUB SERPL-MCNC: 0.4 MG/DL (ref 0.1–1)
BILIRUB UR QL STRIP: NEGATIVE
BUN SERPL-MCNC: 19 MG/DL (ref 6–20)
CALCIUM SERPL-MCNC: 9.2 MG/DL (ref 8.7–10.5)
CHLORIDE SERPL-SCNC: 104 MMOL/L (ref 95–110)
CLARITY UR: CLEAR
CO2 SERPL-SCNC: 25 MMOL/L (ref 23–29)
COLOR UR: YELLOW
CREAT SERPL-MCNC: 1.3 MG/DL (ref 0.5–1.4)
DIFFERENTIAL METHOD: ABNORMAL
EOSINOPHIL # BLD AUTO: 0.2 K/UL (ref 0–0.5)
EOSINOPHIL NFR BLD: 1.6 % (ref 0–8)
ERYTHROCYTE [DISTWIDTH] IN BLOOD BY AUTOMATED COUNT: 12.6 % (ref 11.5–14.5)
EST. GFR  (NO RACE VARIABLE): >60 ML/MIN/1.73 M^2
GLUCOSE SERPL-MCNC: 138 MG/DL (ref 70–110)
GLUCOSE UR QL STRIP: NEGATIVE
HCT VFR BLD AUTO: 47 % (ref 40–54)
HGB BLD-MCNC: 16.2 G/DL (ref 14–18)
HGB UR QL STRIP: NEGATIVE
IMM GRANULOCYTES # BLD AUTO: 0.04 K/UL (ref 0–0.04)
IMM GRANULOCYTES NFR BLD AUTO: 0.4 % (ref 0–0.5)
KETONES UR QL STRIP: NEGATIVE
LEUKOCYTE ESTERASE UR QL STRIP: NEGATIVE
LYMPHOCYTES # BLD AUTO: 2.3 K/UL (ref 1–4.8)
LYMPHOCYTES NFR BLD: 22.5 % (ref 18–48)
MCH RBC QN AUTO: 31.1 PG (ref 27–31)
MCHC RBC AUTO-ENTMCNC: 34.5 G/DL (ref 32–36)
MCV RBC AUTO: 90 FL (ref 82–98)
MONOCYTES # BLD AUTO: 0.6 K/UL (ref 0.3–1)
MONOCYTES NFR BLD: 5.5 % (ref 4–15)
NEUTROPHILS # BLD AUTO: 7.1 K/UL (ref 1.8–7.7)
NEUTROPHILS NFR BLD: 69.4 % (ref 38–73)
NITRITE UR QL STRIP: NEGATIVE
NRBC BLD-RTO: 0 /100 WBC
PH UR STRIP: 7 [PH] (ref 5–8)
PLATELET # BLD AUTO: 224 K/UL (ref 150–450)
PMV BLD AUTO: 10.9 FL (ref 9.2–12.9)
POCT GLUCOSE: 151 MG/DL (ref 70–110)
POCT GLUCOSE: 91 MG/DL (ref 70–110)
POTASSIUM SERPL-SCNC: 4 MMOL/L (ref 3.5–5.1)
PROT SERPL-MCNC: 6.7 G/DL (ref 6–8.4)
PROT UR QL STRIP: ABNORMAL
RBC # BLD AUTO: 5.21 M/UL (ref 4.6–6.2)
SODIUM SERPL-SCNC: 139 MMOL/L (ref 136–145)
SP GR UR STRIP: >1.03 (ref 1–1.03)
TROPONIN I SERPL DL<=0.01 NG/ML-MCNC: 0.02 NG/ML (ref 0–0.03)
TROPONIN I SERPL DL<=0.01 NG/ML-MCNC: <0.006 NG/ML (ref 0–0.03)
TROPONIN I SERPL DL<=0.01 NG/ML-MCNC: <0.006 NG/ML (ref 0–0.03)
URN SPEC COLLECT METH UR: ABNORMAL
UROBILINOGEN UR STRIP-ACNC: NEGATIVE EU/DL
WBC # BLD AUTO: 10.19 K/UL (ref 3.9–12.7)

## 2023-11-09 PROCEDURE — 3051F PR MOST RECENT HEMOGLOBIN A1C LEVEL 7.0 - < 8.0%: ICD-10-PCS | Mod: CPTII,S$GLB,, | Performed by: NURSE PRACTITIONER

## 2023-11-09 PROCEDURE — 25000003 PHARM REV CODE 250: Performed by: NURSE PRACTITIONER

## 2023-11-09 PROCEDURE — 84484 ASSAY OF TROPONIN QUANT: CPT | Performed by: PHYSICIAN ASSISTANT

## 2023-11-09 PROCEDURE — 99999 PR PBB SHADOW E&M-EST. PATIENT-LVL III: CPT | Mod: PBBFAC,,, | Performed by: NURSE PRACTITIONER

## 2023-11-09 PROCEDURE — 99214 PR OFFICE/OUTPT VISIT, EST, LEVL IV, 30-39 MIN: ICD-10-PCS | Mod: ,,, | Performed by: STUDENT IN AN ORGANIZED HEALTH CARE EDUCATION/TRAINING PROGRAM

## 2023-11-09 PROCEDURE — 36415 COLL VENOUS BLD VENIPUNCTURE: CPT | Performed by: NURSE PRACTITIONER

## 2023-11-09 PROCEDURE — 93010 ELECTROCARDIOGRAM REPORT: CPT | Mod: ,,, | Performed by: GENERAL PRACTICE

## 2023-11-09 PROCEDURE — 85025 COMPLETE CBC W/AUTO DIFF WBC: CPT | Performed by: PHYSICIAN ASSISTANT

## 2023-11-09 PROCEDURE — 99214 OFFICE O/P EST MOD 30 MIN: CPT | Mod: ,,, | Performed by: STUDENT IN AN ORGANIZED HEALTH CARE EDUCATION/TRAINING PROGRAM

## 2023-11-09 PROCEDURE — S4991 NICOTINE PATCH NONLEGEND: HCPCS | Performed by: NURSE PRACTITIONER

## 2023-11-09 PROCEDURE — 96374 THER/PROPH/DIAG INJ IV PUSH: CPT

## 2023-11-09 PROCEDURE — G0378 HOSPITAL OBSERVATION PER HR: HCPCS

## 2023-11-09 PROCEDURE — 3051F HG A1C>EQUAL 7.0%<8.0%: CPT | Mod: CPTII,S$GLB,, | Performed by: NURSE PRACTITIONER

## 2023-11-09 PROCEDURE — 81003 URINALYSIS AUTO W/O SCOPE: CPT | Performed by: PHYSICIAN ASSISTANT

## 2023-11-09 PROCEDURE — 63600175 PHARM REV CODE 636 W HCPCS: Performed by: NURSE PRACTITIONER

## 2023-11-09 PROCEDURE — 36415 COLL VENOUS BLD VENIPUNCTURE: CPT | Performed by: PHYSICIAN ASSISTANT

## 2023-11-09 PROCEDURE — 99999 PR PBB SHADOW E&M-EST. PATIENT-LVL III: ICD-10-PCS | Mod: PBBFAC,,, | Performed by: NURSE PRACTITIONER

## 2023-11-09 PROCEDURE — 99285 EMERGENCY DEPT VISIT HI MDM: CPT | Mod: 25

## 2023-11-09 PROCEDURE — 99499 UNLISTED E&M SERVICE: CPT | Mod: S$GLB,,, | Performed by: NURSE PRACTITIONER

## 2023-11-09 PROCEDURE — 25500020 PHARM REV CODE 255

## 2023-11-09 PROCEDURE — 96372 THER/PROPH/DIAG INJ SC/IM: CPT | Mod: 59 | Performed by: NURSE PRACTITIONER

## 2023-11-09 PROCEDURE — 94640 AIRWAY INHALATION TREATMENT: CPT

## 2023-11-09 PROCEDURE — 99499 NO LOS: ICD-10-PCS | Mod: S$GLB,,, | Performed by: NURSE PRACTITIONER

## 2023-11-09 PROCEDURE — 25000242 PHARM REV CODE 250 ALT 637 W/ HCPCS: Performed by: NURSE PRACTITIONER

## 2023-11-09 PROCEDURE — 94760 N-INVAS EAR/PLS OXIMETRY 1: CPT

## 2023-11-09 PROCEDURE — 93010 EKG 12-LEAD: ICD-10-PCS | Mod: ,,, | Performed by: GENERAL PRACTICE

## 2023-11-09 PROCEDURE — 82962 GLUCOSE BLOOD TEST: CPT

## 2023-11-09 PROCEDURE — 99900035 HC TECH TIME PER 15 MIN (STAT)

## 2023-11-09 PROCEDURE — 1160F PR REVIEW ALL MEDS BY PRESCRIBER/CLIN PHARMACIST DOCUMENTED: ICD-10-PCS | Mod: CPTII,S$GLB,, | Performed by: NURSE PRACTITIONER

## 2023-11-09 PROCEDURE — 93005 ELECTROCARDIOGRAM TRACING: CPT

## 2023-11-09 PROCEDURE — 1160F RVW MEDS BY RX/DR IN RCRD: CPT | Mod: CPTII,S$GLB,, | Performed by: NURSE PRACTITIONER

## 2023-11-09 PROCEDURE — 84484 ASSAY OF TROPONIN QUANT: CPT | Mod: 91 | Performed by: NURSE PRACTITIONER

## 2023-11-09 PROCEDURE — 1159F PR MEDICATION LIST DOCUMENTED IN MEDICAL RECORD: ICD-10-PCS | Mod: CPTII,S$GLB,, | Performed by: NURSE PRACTITIONER

## 2023-11-09 PROCEDURE — 96376 TX/PRO/DX INJ SAME DRUG ADON: CPT

## 2023-11-09 PROCEDURE — 63600175 PHARM REV CODE 636 W HCPCS: Performed by: STUDENT IN AN ORGANIZED HEALTH CARE EDUCATION/TRAINING PROGRAM

## 2023-11-09 PROCEDURE — 63700000 PHARM REV CODE 250 ALT 637 W/O HCPCS: Performed by: NURSE PRACTITIONER

## 2023-11-09 PROCEDURE — 1159F MED LIST DOCD IN RCRD: CPT | Mod: CPTII,S$GLB,, | Performed by: NURSE PRACTITIONER

## 2023-11-09 PROCEDURE — 80053 COMPREHEN METABOLIC PANEL: CPT | Performed by: PHYSICIAN ASSISTANT

## 2023-11-09 RX ORDER — ARFORMOTEROL TARTRATE 15 UG/2ML
15 SOLUTION RESPIRATORY (INHALATION) 2 TIMES DAILY
Status: DISCONTINUED | OUTPATIENT
Start: 2023-11-10 | End: 2023-11-10 | Stop reason: HOSPADM

## 2023-11-09 RX ORDER — BUDESONIDE 0.5 MG/2ML
1 INHALANT ORAL 2 TIMES DAILY
Status: DISCONTINUED | OUTPATIENT
Start: 2023-11-10 | End: 2023-11-10 | Stop reason: HOSPADM

## 2023-11-09 RX ORDER — IBUPROFEN 800 MG/1
800 TABLET ORAL 3 TIMES DAILY
Qty: 75 TABLET | Refills: 0 | Status: SHIPPED | OUTPATIENT
Start: 2023-11-09 | End: 2023-12-04

## 2023-11-09 RX ORDER — IBUPROFEN 200 MG
1 TABLET ORAL DAILY
Status: DISCONTINUED | OUTPATIENT
Start: 2023-11-09 | End: 2023-11-10 | Stop reason: HOSPADM

## 2023-11-09 RX ORDER — SODIUM,POTASSIUM PHOSPHATES 280-250MG
2 POWDER IN PACKET (EA) ORAL
Status: DISCONTINUED | OUTPATIENT
Start: 2023-11-09 | End: 2023-11-10 | Stop reason: HOSPADM

## 2023-11-09 RX ORDER — IBUPROFEN 200 MG
24 TABLET ORAL
Status: DISCONTINUED | OUTPATIENT
Start: 2023-11-09 | End: 2023-11-10 | Stop reason: HOSPADM

## 2023-11-09 RX ORDER — ONDANSETRON 2 MG/ML
4 INJECTION INTRAMUSCULAR; INTRAVENOUS EVERY 6 HOURS PRN
Status: DISCONTINUED | OUTPATIENT
Start: 2023-11-09 | End: 2023-11-10 | Stop reason: HOSPADM

## 2023-11-09 RX ORDER — IBUPROFEN 200 MG
16 TABLET ORAL
Status: DISCONTINUED | OUTPATIENT
Start: 2023-11-09 | End: 2023-11-10 | Stop reason: HOSPADM

## 2023-11-09 RX ORDER — METOCLOPRAMIDE 5 MG/1
10 TABLET ORAL 2 TIMES DAILY
Status: DISCONTINUED | OUTPATIENT
Start: 2023-11-09 | End: 2023-11-10 | Stop reason: HOSPADM

## 2023-11-09 RX ORDER — OXYCODONE AND ACETAMINOPHEN 10; 325 MG/1; MG/1
1 TABLET ORAL EVERY 6 HOURS PRN
Status: DISCONTINUED | OUTPATIENT
Start: 2023-11-09 | End: 2023-11-10 | Stop reason: HOSPADM

## 2023-11-09 RX ORDER — INSULIN ASPART 100 [IU]/ML
0-5 INJECTION, SOLUTION INTRAVENOUS; SUBCUTANEOUS
Status: DISCONTINUED | OUTPATIENT
Start: 2023-11-09 | End: 2023-11-10 | Stop reason: HOSPADM

## 2023-11-09 RX ORDER — ENOXAPARIN SODIUM 100 MG/ML
40 INJECTION SUBCUTANEOUS EVERY 24 HOURS
Status: DISCONTINUED | OUTPATIENT
Start: 2023-11-09 | End: 2023-11-10 | Stop reason: HOSPADM

## 2023-11-09 RX ORDER — ACETAMINOPHEN 325 MG/1
650 TABLET ORAL EVERY 4 HOURS PRN
Status: DISCONTINUED | OUTPATIENT
Start: 2023-11-09 | End: 2023-11-10 | Stop reason: HOSPADM

## 2023-11-09 RX ORDER — MORPHINE SULFATE 10 MG/ML
10 INJECTION INTRAMUSCULAR; INTRAVENOUS; SUBCUTANEOUS
Status: COMPLETED | OUTPATIENT
Start: 2023-11-09 | End: 2023-11-09

## 2023-11-09 RX ORDER — LANOLIN ALCOHOL/MO/W.PET/CERES
800 CREAM (GRAM) TOPICAL
Status: DISCONTINUED | OUTPATIENT
Start: 2023-11-09 | End: 2023-11-10 | Stop reason: HOSPADM

## 2023-11-09 RX ORDER — NALOXONE HCL 0.4 MG/ML
0.02 VIAL (ML) INJECTION
Status: DISCONTINUED | OUTPATIENT
Start: 2023-11-09 | End: 2023-11-10 | Stop reason: HOSPADM

## 2023-11-09 RX ORDER — SULFAMETHOXAZOLE AND TRIMETHOPRIM 800; 160 MG/1; MG/1
1 TABLET ORAL 2 TIMES DAILY
Qty: 42 TABLET | Refills: 0 | Status: SHIPPED | OUTPATIENT
Start: 2023-11-09 | End: 2023-11-30

## 2023-11-09 RX ORDER — IPRATROPIUM BROMIDE 0.5 MG/2.5ML
0.5 SOLUTION RESPIRATORY (INHALATION) EVERY 6 HOURS
Status: DISCONTINUED | OUTPATIENT
Start: 2023-11-10 | End: 2023-11-10 | Stop reason: HOSPADM

## 2023-11-09 RX ORDER — GABAPENTIN 400 MG/1
800 CAPSULE ORAL 3 TIMES DAILY
Status: DISCONTINUED | OUTPATIENT
Start: 2023-11-09 | End: 2023-11-10 | Stop reason: HOSPADM

## 2023-11-09 RX ORDER — PANTOPRAZOLE SODIUM 40 MG/1
40 TABLET, DELAYED RELEASE ORAL DAILY
Status: DISCONTINUED | OUTPATIENT
Start: 2023-11-10 | End: 2023-11-10 | Stop reason: HOSPADM

## 2023-11-09 RX ORDER — METHOCARBAMOL 750 MG/1
750 TABLET, FILM COATED ORAL 2 TIMES DAILY
Status: DISCONTINUED | OUTPATIENT
Start: 2023-11-09 | End: 2023-11-10 | Stop reason: HOSPADM

## 2023-11-09 RX ORDER — MORPHINE SULFATE 2 MG/ML
6 INJECTION, SOLUTION INTRAMUSCULAR; INTRAVENOUS
Status: COMPLETED | OUTPATIENT
Start: 2023-11-09 | End: 2023-11-09

## 2023-11-09 RX ORDER — ALBUTEROL SULFATE 2.5 MG/.5ML
2.5 SOLUTION RESPIRATORY (INHALATION) EVERY 6 HOURS PRN
Status: DISCONTINUED | OUTPATIENT
Start: 2023-11-09 | End: 2023-11-10 | Stop reason: HOSPADM

## 2023-11-09 RX ORDER — SODIUM CHLORIDE 0.9 % (FLUSH) 0.9 %
10 SYRINGE (ML) INJECTION EVERY 12 HOURS PRN
Status: DISCONTINUED | OUTPATIENT
Start: 2023-11-09 | End: 2023-11-10 | Stop reason: HOSPADM

## 2023-11-09 RX ORDER — MAG HYDROX/ALUMINUM HYD/SIMETH 200-200-20
30 SUSPENSION, ORAL (FINAL DOSE FORM) ORAL 4 TIMES DAILY PRN
Status: DISCONTINUED | OUTPATIENT
Start: 2023-11-09 | End: 2023-11-10 | Stop reason: HOSPADM

## 2023-11-09 RX ORDER — GLUCAGON 1 MG
1 KIT INJECTION
Status: DISCONTINUED | OUTPATIENT
Start: 2023-11-09 | End: 2023-11-10 | Stop reason: HOSPADM

## 2023-11-09 RX ORDER — FUROSEMIDE 40 MG/1
40 TABLET ORAL DAILY
Status: DISCONTINUED | OUTPATIENT
Start: 2023-11-10 | End: 2023-11-10 | Stop reason: HOSPADM

## 2023-11-09 RX ADMIN — OXYCODONE AND ACETAMINOPHEN 1 TABLET: 10; 325 TABLET ORAL at 11:11

## 2023-11-09 RX ADMIN — ENOXAPARIN SODIUM 40 MG: 40 INJECTION SUBCUTANEOUS at 04:11

## 2023-11-09 RX ADMIN — METHOCARBAMOL 750 MG: 750 TABLET, FILM COATED ORAL at 08:11

## 2023-11-09 RX ADMIN — IOHEXOL 75 ML: 350 INJECTION, SOLUTION INTRAVENOUS at 11:11

## 2023-11-09 RX ADMIN — Medication 1 PATCH: at 07:11

## 2023-11-09 RX ADMIN — OXYCODONE AND ACETAMINOPHEN 1 TABLET: 10; 325 TABLET ORAL at 04:11

## 2023-11-09 RX ADMIN — METOCLOPRAMIDE 10 MG: 5 TABLET ORAL at 08:11

## 2023-11-09 RX ADMIN — ALBUTEROL SULFATE 2.5 MG: 2.5 SOLUTION RESPIRATORY (INHALATION) at 07:11

## 2023-11-09 RX ADMIN — TRAZODONE HYDROCHLORIDE 25 MG: 50 TABLET ORAL at 11:11

## 2023-11-09 RX ADMIN — FLUCONAZOLE 150 MG: 50 TABLET ORAL at 06:11

## 2023-11-09 RX ADMIN — MORPHINE SULFATE 10 MG: 10 INJECTION, SOLUTION INTRAMUSCULAR; INTRAVENOUS at 01:11

## 2023-11-09 RX ADMIN — MORPHINE SULFATE 6 MG: 2 INJECTION, SOLUTION INTRAMUSCULAR; INTRAVENOUS at 11:11

## 2023-11-09 RX ADMIN — GABAPENTIN 800 MG: 400 CAPSULE ORAL at 08:11

## 2023-11-09 NOTE — ASSESSMENT & PLAN NOTE
"Patient's FSGs are controlled on current medication regimen.  Last A1c reviewed-   Lab Results   Component Value Date    HGBA1C 7.2 (H) 11/02/2023     Most recent fingerstick glucose reviewed- No results for input(s): "POCTGLUCOSE" in the last 24 hours.  Current correctional scale  Low  Maintain anti-hyperglycemic dose as follows-   Antihyperglycemics (From admission, onward)    Start     Stop Route Frequency Ordered    11/09/23 1635  insulin aspart U-100 pen 0-5 Units         -- SubQ Before meals & nightly PRN 11/09/23 1635        Hold Oral hypoglycemics while patient is in the hospital.      "

## 2023-11-09 NOTE — ED PROVIDER NOTES
Encounter Date: 11/9/2023       History     Chief Complaint   Patient presents with    Groin Swelling     Right testicle swelling and pain; has needed it drained 4 times in the past, known lymph node cancer      53 y.o. male with COPD, coronary artery disease, DM, lung mass, chronic back pain, recurrent testicular hydrocele presents for right testicular swelling and chest pain.  Patient reports recurrence of his right testicle swelling which has been managed by urology in the past and has required percutaneous drainage.  Additionally, patient has been reporting 2 days of intermittent left-sided chest pain radiating to the posterior left shoulder and associated with shortness of breath.  He denies chest pain at time of evaluation.  Patient does report ongoing shortness of breath.  He denies any dysuria.  He does report itching in the groin area    The history is provided by the patient and medical records.     Review of patient's allergies indicates:   Allergen Reactions    Codeine Swelling, Anaphylaxis, Hives and Other (See Comments)     Throat swell, rash     Past Medical History:   Diagnosis Date    Chronic back pain     COPD (chronic obstructive pulmonary disease)     Coronary artery disease     Diabetes mellitus     Lung nodule     Sleep apnea     Unspecified injury of head, initial encounter 1975     Past Surgical History:   Procedure Laterality Date    BRONCHOSCOPY WITH FLUOROSCOPY N/A 10/25/2023    Procedure: BRONCHOSCOPY, WITH FLUOROSCOPY;  Surgeon: Alice Holman MD;  Location: [facility];  Service: Pulmonary;  Laterality: N/A;    CARDIAC SURGERY  2001    CARIDAC STENTS 7 ;  TEXARCANA     Family History   Problem Relation Age of Onset    Diabetes Mother     Hypertension Mother     Heart disease Mother     Stroke Mother     Diabetes Father     Hypertension Father     Heart disease Father     Stroke Father     Cancer Maternal Uncle      Social History     Tobacco Use    Smoking status: Every Day      Current packs/day: 0.50     Average packs/day: 0.5 packs/day for 50.9 years (25.4 ttl pk-yrs)     Types: Cigarettes     Start date: 1/1/1973    Smokeless tobacco: Never   Substance Use Topics    Alcohol use: Not Currently    Drug use: Never     Review of Systems   Reason unable to perform ROS: See HPI for relevant ROS.       Physical Exam     Initial Vitals [11/09/23 1015]   BP Pulse Resp Temp SpO2   136/72 79 20 98.4 °F (36.9 °C) 99 %      MAP       --         Physical Exam    Nursing note and vitals reviewed.  Constitutional:   Alert, speaking full sentences, no acute distress   Eyes: Conjunctivae are normal. No scleral icterus.   Cardiovascular:  Normal rate, regular rhythm and intact distal pulses.           Pulmonary/Chest:   Normal work of breathing, focal wheezing in the right mid and upper chest   Abdominal: Abdomen is soft. He exhibits no distension. There is no abdominal tenderness.   Genitourinary:    Genitourinary Comments: Chaperone was present throughout examination  Left testicle: normal, no significant tenderness, no skin changes  Right testicle:  Marked swelling to about the size of a large orange, tender to touch, mildly erythematous, no open wounds  Penis appears normal  Scattered erythematous, raised lesions around the groin/inguinal folds  No perineal erythema, tenderness, or crepitus     Musculoskeletal:         General: No tenderness or edema.     Neurological: He is alert and oriented to person, place, and time.   Skin: Skin is warm and dry.         ED Course   Procedures  Labs Reviewed   URINALYSIS, REFLEX TO URINE CULTURE - Abnormal; Notable for the following components:       Result Value    Specific Gravity, UA >1.030 (*)     Protein, UA Trace (*)     All other components within normal limits    Narrative:     Specimen Source->Urine   CBC W/ AUTO DIFFERENTIAL - Abnormal; Notable for the following components:    MCH 31.1 (*)     All other components within normal limits   COMPREHENSIVE  METABOLIC PANEL - Abnormal; Notable for the following components:    Glucose 138 (*)     All other components within normal limits   TROPONIN I   TROPONIN I   TROPONIN I   POCT GLUCOSE   POCT GLUCOSE MONITORING CONTINUOUS     EKG Readings: (Independently Interpreted)   Sinus rhythm, regular, narrow complex, rate of 67, no STEMI, no significant ST deviations, no significant change compared to prior       Imaging Results              CTA Chest Non-Coronary (PE Studies) (Final result)  Result time 11/09/23 11:59:06      Final result by Kim Conley MD (11/09/23 11:59:06)                   Impression:      There is no evidence pulmonary embolus.  The patient's known 3.2 cm right middle lobe mass appears similar to what is been seen on prior exams.      Electronically signed by: Kim Conley MD  Date:    11/09/2023  Time:    11:59               Narrative:    EXAMINATION:  CTA CHEST NON CORONARY (PE STUDIES)    CLINICAL HISTORY:  Pulmonary embolism (PE) suspected, high prob;    TECHNIQUE:  Low dose axial images, sagittal and coronal reformations were obtained from the thoracic inlet to the lung bases following the IV administration of 100 mL of Omnipaque 350.  Contrast timing was optimized to evaluate the pulmonary arteries.  MIP images were performed.    COMPARISON:  PET-CT 08/09/2023.    FINDINGS:  There are no pulmonary arterial filling defects.    There is no pneumothorax, pleural effusion or focal consolidation.  There is a 3.2 cm mass lesion seen within the right upper lobe of the lung that appears unchanged from what was seen on 08/09/2023 PET-CT.    There is no evidence axillary nor mediastinal lymphadenopathy.  There is no pericardial effusion.                                       US Scrotum And Testicles (Final result)  Result time 11/09/23 11:14:13      Final result by Jose A England Jr., MD (11/09/23 11:14:13)                   Impression:      Large right hydrocele, smallleft  hydrocele      Electronically signed by: Jose A England MD  Date:    11/09/2023  Time:    11:14               Narrative:    EXAMINATION:  US SCROTUM AND TESTICLES    CLINICAL HISTORY:  Other specified disorders of the male genital organs    TECHNIQUE:  Sonography of the scrotum and testes.    COMPARISON:  None.    FINDINGS:  Right Testicle:    *Size: 5.1 x 2.6 x 3.2 cm  *Appearance: Normal.  *Flow: Normal arterial and venous flow  *Epididymis: Not seen  *Hydrocele: Rather large right hydrocele measuring 13 x 12  *Varicocele: None.  .    Left Testicle:    *Size: 3.7 x 2.6 x 2.7 cm  *Appearance: Normal.  *Flow: Normal arterial and venous flow  *Epididymis: Normal.  *Hydrocele: Small.  *Varicocele: None.  .    Other findings: None.                                       Medications   albuterol sulfate nebulizer solution 2.5 mg (has no administration in time range)   furosemide tablet 40 mg (has no administration in time range)   gabapentin capsule 800 mg (has no administration in time range)   methocarbamoL tablet 750 mg (has no administration in time range)   metoclopramide HCl tablet 10 mg (has no administration in time range)   pantoprazole EC tablet 40 mg (has no administration in time range)   sodium chloride 0.9% flush 10 mL (has no administration in time range)   naloxone 0.4 mg/mL injection 0.02 mg (has no administration in time range)   glucose chewable tablet 16 g (has no administration in time range)   glucose chewable tablet 24 g (has no administration in time range)   glucagon (human recombinant) injection 1 mg (has no administration in time range)   potassium bicarbonate disintegrating tablet 50 mEq (has no administration in time range)   potassium bicarbonate disintegrating tablet 35 mEq (has no administration in time range)   potassium bicarbonate disintegrating tablet 60 mEq (has no administration in time range)   enoxaparin injection 40 mg (40 mg Subcutaneous Given 11/9/23 6940)   magnesium oxide  tablet 800 mg (has no administration in time range)   magnesium oxide tablet 800 mg (has no administration in time range)   potassium, sodium phosphates 280-160-250 mg packet 2 packet (has no administration in time range)   potassium, sodium phosphates 280-160-250 mg packet 2 packet (has no administration in time range)   potassium, sodium phosphates 280-160-250 mg packet 2 packet (has no administration in time range)   acetaminophen tablet 650 mg (has no administration in time range)   oxyCODONE-acetaminophen  mg per tablet 1 tablet (1 tablet Oral Given 11/9/23 1654)   ondansetron injection 4 mg (has no administration in time range)   insulin aspart U-100 pen 0-5 Units ( Subcutaneous Not Given 11/9/23 1600)   trazodone split tablet 25 mg (has no administration in time range)   aluminum-magnesium hydroxide-simethicone 200-200-20 mg/5 mL suspension 30 mL (has no administration in time range)   dextrose 10% bolus 125 mL 125 mL (has no administration in time range)   dextrose 10% bolus 250 mL 250 mL (has no administration in time range)   fluconazole tablet 150 mg (has no administration in time range)   budesonide nebulizer solution 1 mg (has no administration in time range)   ipratropium 0.02 % nebulizer solution 0.5 mg (has no administration in time range)   arformoteroL nebulizer solution 15 mcg (has no administration in time range)   morphine injection 6 mg (6 mg Intravenous Given 11/9/23 1154)   iohexoL (OMNIPAQUE 350) 350 mg iodine/mL injection (75 mLs Intravenous Given 11/9/23 1142)   morphine injection 10 mg (10 mg Intravenous Given 11/9/23 1313)     Medical Decision Making  53 y.o. male with COPD, coronary artery disease, DM, lung mass, chronic back pain, recurrent testicular hydrocele presents for right testicular swelling and chest pain  Differentials include hydrocele, lymphadenitis, epididymitis, ACS, cancer-related pain, pneumonia, PE  Patient reports episodic left-sided chest pain radiating to the  posterior left shoulder with associated shortness of breath, not active during ED evaluation, EKG without evidence of active ischemia, patient has known coronary artery disease in his similar pain in the past  Patient also presenting with a right-sided testicular hydrocele, this has been managed by urology in the past  No evidence of perineal cellulitis/necrotizing fasciitis.  Patient does have a scaly fungal appearing rash to the groin and buttocks, he does report it is itchy    Amount and/or Complexity of Data Reviewed  Radiology: ordered.    Risk  Prescription drug management.      Additional MDM:   Heart Score:    History:          Moderately suspicious.  ECG:             Normal  Age:               45-65 years  Risk factors: >= 3 risk factors or history of atherosclerotic disease  Troponin:       Less than or equal to normal limit  Heart Score = 4                ED Course as of 11/09/23 1709   Thu Nov 09, 2023   1311 Patient with ongoing pain, additional analgesia ordered.  Attempted to contact Urology for further evaluation of his hydrocele.  Patient admitted to hospital medicine for high-risk chest pain [OK]   1515 Discussed with urology who will evaluate [OK]      ED Course User Index  [OK] Emanuel Arambula MD                    Clinical Impression:   Final diagnoses:  [N50.89] Testicular swelling  [R07.9] Chest pain (Primary)        ED Disposition Condition    Observation Stable                Emanuel Arambula MD  11/09/23 4319

## 2023-11-09 NOTE — ASSESSMENT & PLAN NOTE
Pt has h/o CAD with stenting  Menstrual troponin negative   Will continue to trend troponins   EKG reviewed no acute ST changes   Will obtain stress test and echo

## 2023-11-09 NOTE — FIRST PROVIDER EVALUATION
" Emergency Department TeleTriage Encounter Note      CHIEF COMPLAINT    Chief Complaint   Patient presents with    Groin Swelling     Right testicle swelling and pain; has needed it drained 4 times in the past, known lymph node cancer        VITAL SIGNS   Initial Vitals [11/09/23 1015]   BP Pulse Resp Temp SpO2   136/72 79 20 98.4 °F (36.9 °C) 99 %      MAP       --            ALLERGIES    Review of patient's allergies indicates:   Allergen Reactions    Codeine Swelling, Anaphylaxis, Hives and Other (See Comments)     Throat swell, rash       PROVIDER TRIAGE NOTE  This is a teletriage evaluation of a 53 y.o. male presenting to the ED complaining of multiple complaints. Patient reports testicular swelling and pain for the past week. Testicle is the size of a grapefruit. He states he has been urinating on himself because "he can't find it." He also reports chest pain and shortness of breath. Patient has history of CAD with 7 stents.    Patient is alert and oriented. He speaks in complete sentences. He is sitting upright in the chair in no distress.     Initial orders will be placed and care will be transferred to an alternate provider when patient is roomed for a full evaluation. Any additional orders and the final disposition will be determined by that provider.         ORDERS  Labs Reviewed   URINALYSIS, REFLEX TO URINE CULTURE   CBC W/ AUTO DIFFERENTIAL   COMPREHENSIVE METABOLIC PANEL   TROPONIN I       ED Orders (720h ago, onward)      Start Ordered     Status Ordering Provider    11/09/23 1025 11/09/23 1024  CBC auto differential  STAT         Pending Collection ROMEO MACEDO    11/09/23 1025 11/09/23 1024  Comprehensive metabolic panel  STAT         Pending Collection ROMEO MACEDO    11/09/23 1025 11/09/23 1024  Troponin I  STAT         Pending Collection ROMEO MACEDO    11/09/23 1025 11/09/23 1024  Insert Saline lock IV  Once         Ordered ROMEO MACEDO    11/09/23 1025 11/09/23 1024  EKG 12-lead  " Once         Ordered ROMEO MACEDO.    11/09/23 1024 11/09/23 1023  Urinalysis, Reflex to Urine Culture Urine, Clean Catch  STAT         Ordered HELLENROMEO.    11/09/23 1024 11/09/23 1023  US Scrotum And Testicles  1 time imaging         In process HELLEN ROMEO RUBENHe              Virtual Visit Note: The provider triage portion of this emergency department evaluation and documentation was performed via [employer], a HIPAA-compliant telemedicine application, in concert with a tele-presenter in the room. A face to face patient evaluation with one of my colleagues will occur once the patient is placed in an emergency department room.      DISCLAIMER: This note was prepared with [employer] voice recognition transcription software. Garbled syntax, mangled pronouns, and other bizarre constructions may be attributed to that software system.

## 2023-11-09 NOTE — H&P
[facility]  History & Physical    Patient Name: Tolu Lyles Sr.  MRN: [id number]  Patient Class: OP- Observation  Admission Date: 11/9/2023  Attending Physician: Chidi Goss MD   Primary Care Provider: Liana Primary Doctor         Patient information was obtained from patient, past medical records and ER records.     Subjective:     Principal Problem:Chest pain    Chief Complaint:   Chief Complaint   Patient presents with    Groin Swelling     Right testicle swelling and pain; has needed it drained 4 times in the past, known lymph node cancer         HPI: Tolu Lyles Sr. Is a 53 yr old male with PMHx of CAD status post stenting, chronic right hydrocele, lung cancer, chronic back pain, diabetes and COPD presenting today for right testicle swelling and pain and chest pain.  Patient states he has been experiencing intermittent chest pain over the past several weeks.  He was recently diagnosed with lung cancer and He feels that his CP is likely due to his anxiety but he also has history of cardiac stents and wanted to make sure he was not having a heart attack.  He has also been experiencing chronic right testicle swelling that has increased over the past few weeks.  He does admit to noncompliance with follow-up but states that this is due to financial constraints as he is unable to pay his co-payments and keep up with his medical bills.  Patient describes his chest pain as midsternal location, sharp quality, last for few minutes, resolves with rest, nonexertional, no aggravating or alleviating factors.  Patient also complaining of right groin pain that has been increasing and he went to his urologist office today in hopes to get this drained and he became upset when he was told that he would have to schedule surgery and therefore he left and came to the ER.  Initial troponin is negative.  Patient is currently chest pain-free.  EKG without any acute ST changes.  Patient will be  admitted to hospital medicine services for further workup and management.        Past Medical History:   Diagnosis Date    Chronic back pain     COPD (chronic obstructive pulmonary disease)     Coronary artery disease     Diabetes mellitus     Lung nodule     Sleep apnea     Unspecified injury of head, initial encounter 1975       Past Surgical History:   Procedure Laterality Date    BRONCHOSCOPY WITH FLUOROSCOPY N/A 10/25/2023    Procedure: BRONCHOSCOPY, WITH FLUOROSCOPY;  Surgeon: Alice Holman MD;  Location: [facility];  Service: Pulmonary;  Laterality: N/A;    CARDIAC SURGERY  2001    CARIDAC STENTS 7 ;  TEXARCANA       Review of patient's allergies indicates:   Allergen Reactions    Codeine Swelling, Anaphylaxis, Hives and Other (See Comments)     Throat swell, rash       No current facility-administered medications on file prior to encounter.     Current Outpatient Medications on File Prior to Encounter   Medication Sig    albuterol (PROVENTIL) 2.5 mg /3 mL (0.083 %) nebulizer solution Take 3 mLs (2.5 mg total) by nebulization every 6 (six) hours as needed for Wheezing. Rescue    fluticasone-umeclidin-vilanter (TRELEGY ELLIPTA) 200-62.5-25 mcg inhaler Inhale 1 puff into the lungs once daily.    furosemide (LASIX) 40 MG tablet Take 40 mg by mouth once daily.    gabapentin (NEURONTIN) 400 MG capsule Take 2 capsules (800 mg total) by mouth 3 (three) times daily.    metFORMIN (GLUCOPHAGE) 1000 MG tablet Take 1,000 mg by mouth 2 (two) times daily with meals.    methocarbamoL (ROBAXIN) 750 MG Tab Take 750 mg by mouth 2 (two) times daily.    metoclopramide HCl (REGLAN) 10 MG tablet Take 10 mg by mouth 2 (two) times a day.    omeprazole (PRILOSEC) 40 MG capsule Take 40 mg by mouth once daily.    ALPRAZolam (XANAX) 0.25 MG tablet Take 1 tablet (0.25 mg total) by mouth 2 (two) times daily as needed for Anxiety.    aspirin 500 MG EC tablet Take 500 mg by mouth once daily.    ibuprofen  (ADVIL,MOTRIN) 800 MG tablet Take 1 tablet (800 mg total) by mouth 3 (three) times daily. for 25 days    sulfamethoxazole-trimethoprim 800-160mg (BACTRIM DS) 800-160 mg Tab Take 1 tablet by mouth 2 (two) times daily. for 21 days    [DISCONTINUED] budesonide-glycopyr-formoterol (BREZTRI AEROSPHERE) 160-9-4.8 mcg/actuation HFAA Inhale 2 puffs into the lungs 2 (two) times a day.    [DISCONTINUED] insulin detemir U-100, Levemir, (LEVEMIR FLEXPEN) 100 unit/mL (3 mL) InPn pen Inject into the skin every meal as needed.    [DISCONTINUED] oxyCODONE-acetaminophen (PERCOCET)  mg per tablet Take 1 tablet by mouth every 4 (four) hours as needed for Pain.     Family History       Problem Relation (Age of Onset)    Cancer Maternal Uncle    Diabetes Mother, Father    Heart disease Mother, Father    Hypertension Mother, Father    Stroke Mother, Father          Tobacco Use    Smoking status: Every Day     Current packs/day: 0.50     Average packs/day: 0.5 packs/day for 50.9 years (25.4 ttl pk-yrs)     Types: Cigarettes     Start date: 1/1/1973    Smokeless tobacco: Never   Substance and Sexual Activity    Alcohol use: Not Currently    Drug use: Never    Sexual activity: Not Currently     Review of Systems   Constitutional:  Negative for chills, fatigue and fever.   Respiratory:  Negative for cough and shortness of breath.    Cardiovascular:  Positive for chest pain.   Gastrointestinal:  Negative for abdominal pain, diarrhea, nausea and vomiting.   Genitourinary:  Positive for scrotal swelling and testicular pain. Negative for difficulty urinating, dysuria, flank pain and frequency.   Musculoskeletal:  Positive for back pain (chronic).   Skin:  Positive for rash (perineum).   Psychiatric/Behavioral:  Negative for agitation, behavioral problems and confusion.    All other systems reviewed and are negative.    Objective:     Vital Signs (Most Recent):  Temp: 97.9 °F (36.6 °C) (11/09/23 1508)  Pulse: (!) 56 (11/09/23  1530)  Resp: 16 (11/09/23 1530)  BP: 135/88 (11/09/23 1530)  SpO2: 95 % (11/09/23 1530) Vital Signs (24h Range):  Temp:  [97.9 °F (36.6 °C)-98.4 °F (36.9 °C)] 97.9 °F (36.6 °C)  Pulse:  [56-79] 56  Resp:  [14-20] 16  SpO2:  [92 %-99 %] 95 %  BP: (126-153)/(72-88) 135/88     Weight: 121.6 kg (268 lb)  Body mass index is 36.35 kg/m².     Physical Exam  Vitals and nursing note reviewed.   Constitutional:       General: He is not in acute distress.     Appearance: Normal appearance. He is well-developed. He is obese. He is not ill-appearing or diaphoretic.   HENT:      Head: Normocephalic and atraumatic.      Right Ear: External ear normal.      Left Ear: External ear normal.      Nose: Nose normal. No congestion or rhinorrhea.      Mouth/Throat:      Mouth: Mucous membranes are moist.      Pharynx: Oropharynx is clear. No oropharyngeal exudate or posterior oropharyngeal erythema.   Eyes:      General: No scleral icterus.     Conjunctiva/sclera: Conjunctivae normal.      Pupils: Pupils are equal, round, and reactive to light.   Neck:      Vascular: No JVD.   Cardiovascular:      Rate and Rhythm: Normal rate and regular rhythm.      Pulses: Normal pulses.      Heart sounds: Normal heart sounds. No murmur heard.  Pulmonary:      Effort: Pulmonary effort is normal. No respiratory distress.      Breath sounds: Normal breath sounds. No stridor. No wheezing, rhonchi or rales.   Abdominal:      General: Bowel sounds are normal. There is no distension.      Palpations: Abdomen is soft.      Tenderness: There is no abdominal tenderness.   Genitourinary:     Comments: Fungal rash to perineum, severe hydrocele present, able to manipulate hydrocele to expose penis. No erythema or rash to penis or glans.   Musculoskeletal:         General: No swelling or tenderness. Normal range of motion.      Cervical back: Normal range of motion and neck supple.   Skin:     General: Skin is warm and dry.      Capillary Refill: Capillary refill  "takes 2 to 3 seconds.      Coloration: Skin is not jaundiced or pale.      Findings: No erythema.   Neurological:      General: No focal deficit present.      Mental Status: He is alert and oriented to person, place, and time.      Cranial Nerves: No cranial nerve deficit.      Sensory: No sensory deficit.   Psychiatric:         Mood and Affect: Mood normal.         Behavior: Behavior normal.         Thought Content: Thought content normal.              CRANIAL NERVES     CN III, IV, VI   Pupils are equal, round, and reactive to light.       Significant Labs: All pertinent labs within the past 24 hours have been reviewed.  CBC:   Recent Labs   Lab 11/09/23  1117   WBC 10.19   HGB 16.2   HCT 47.0        CMP:   Recent Labs   Lab 11/09/23  1117      K 4.0      CO2 25   *   BUN 19   CREATININE 1.3   CALCIUM 9.2   PROT 6.7   ALBUMIN 3.8   BILITOT 0.4   ALKPHOS 65   AST 12   ALT 16   ANIONGAP 10     Troponin:   Recent Labs   Lab 11/09/23  1117   TROPONINI 0.017       Significant Imaging: I have reviewed all pertinent imaging results/findings within the past 24 hours.    Assessment/Plan:     * Chest pain  Pt has h/o CAD with stenting  Menstrual troponin negative   Will continue to trend troponins   EKG reviewed no acute ST changes   Will obtain stress test and echo      Type 2 diabetes mellitus, without long-term current use of insulin  Patient's FSGs are controlled on current medication regimen.  Last A1c reviewed-   Lab Results   Component Value Date    HGBA1C 7.2 (H) 11/02/2023     Most recent fingerstick glucose reviewed- No results for input(s): "POCTGLUCOSE" in the last 24 hours.  Current correctional scale  Low  Maintain anti-hyperglycemic dose as follows-   Antihyperglycemics (From admission, onward)    Start     Stop Route Frequency Ordered    11/09/23 1635  insulin aspart U-100 pen 0-5 Units         -- SubQ Before meals & nightly PRN 11/09/23 1635        Hold Oral hypoglycemics while " patient is in the hospital.        Hydrocele  Patient has chronic right hydrocele that is being followed outpatient with Urology  Hydrocele has significantly increased over the past few weeks causing discomfort   Ultrasound reviewed and no testicular torsion  Urology consulted   UA pending      Coronary artery disease  Patient with known CAD s/p stent placement, which is controlled Will continue ASA and Statin and monitor for S/Sx of angina/ACS. Continue to monitor on telemetry.     BMI 35.0-35.9,adult  Body mass index is 36.35 kg/m². Morbid obesity complicates all aspects of disease management from diagnostic modalities to treatment. Weight loss encouraged and health benefits explained to patient.          VTE Risk Mitigation (From admission, onward)         Ordered     enoxaparin injection 40 mg  Daily         11/09/23 1635     IP VTE HIGH RISK PATIENT  Once         11/09/23 1635     Place sequential compression device  Until discontinued         11/09/23 1635                     On 11/09/2023, patient should be placed in hospital observation services under my care in collaboration with Dr. Chidi Goss MD.          Cari Marcelo NP  Department of Hospital Medicine  [facility]

## 2023-11-09 NOTE — ASSESSMENT & PLAN NOTE
Patient has chronic right hydrocele that is being followed outpatient with Urology  Hydrocele has significantly increased over the past few weeks causing discomfort   Ultrasound reviewed and no testicular torsion  Urology consulted   UA pending

## 2023-11-09 NOTE — PHARMACY MED REC
"Admission Medication History     The home medication history was taken by Guido Ramos.    You may go to "Admission" then "Reconcile Home Medications" tabs to review and/or act upon these items.     The home medication list has been updated by the Pharmacy department.   Please read ALL comments highlighted in yellow.   Please address this information as you see fit.    Feel free to contact us if you have any questions or require assistance.      Medications listed below were obtained from: Patient/family and Analytic software- [employer]  No current facility-administered medications on file prior to encounter.     Current Outpatient Medications on File Prior to Encounter   Medication Sig Dispense Refill    albuterol (PROVENTIL) 2.5 mg /3 mL (0.083 %) nebulizer solution Take 3 mLs (2.5 mg total) by nebulization every 6 (six) hours as needed for Wheezing. Rescue 120 mL 3    fluticasone-umeclidin-vilanter (TRELEGY ELLIPTA) 200-62.5-25 mcg inhaler Inhale 1 puff into the lungs once daily. 60 each 0    furosemide (LASIX) 40 MG tablet Take 40 mg by mouth once daily.      gabapentin (NEURONTIN) 400 MG capsule Take 2 capsules (800 mg total) by mouth 3 (three) times daily. 180 capsule 0    metFORMIN (GLUCOPHAGE) 1000 MG tablet Take 1,000 mg by mouth 2 (two) times daily with meals.      methocarbamoL (ROBAXIN) 750 MG Tab Take 750 mg by mouth 2 (two) times daily.      metoclopramide HCl (REGLAN) 10 MG tablet Take 10 mg by mouth 2 (two) times a day.      omeprazole (PRILOSEC) 40 MG capsule Take 40 mg by mouth once daily.      ALPRAZolam (XANAX) 0.25 MG tablet Take 1 tablet (0.25 mg total) by mouth 2 (two) times daily as needed for Anxiety. 10 tablet 0    aspirin 500 MG EC tablet Take 500 mg by mouth once daily.      budesonide-glycopyr-formoterol (BREZTRI AEROSPHERE) 160-9-4.8 mcg/actuation HFAA Inhale 2 puffs into the lungs 2 (two) times a day. 10.7 g 11    ibuprofen (ADVIL,MOTRIN) 800 MG tablet Take 1 tablet (800 mg total) by " mouth 3 (three) times daily. for 25 days 75 tablet 0    insulin detemir U-100, Levemir, (LEVEMIR FLEXPEN) 100 unit/mL (3 mL) InPn pen Inject into the skin every meal as needed.      oxyCODONE-acetaminophen (PERCOCET)  mg per tablet Take 1 tablet by mouth every 4 (four) hours as needed for Pain. 90 tablet 0    sulfamethoxazole-trimethoprim 800-160mg (BACTRIM DS) 800-160 mg Tab Take 1 tablet by mouth 2 (two) times daily. for 21 days 42 tablet 0           Guido Ramos  EXT 1926                 .

## 2023-11-09 NOTE — SUBJECTIVE & OBJECTIVE
Past Medical History:   Diagnosis Date    Chronic back pain     COPD (chronic obstructive pulmonary disease)     Coronary artery disease     Diabetes mellitus     Lung nodule     Sleep apnea     Unspecified injury of head, initial encounter 1975       Past Surgical History:   Procedure Laterality Date    BRONCHOSCOPY WITH FLUOROSCOPY N/A 10/25/2023    Procedure: BRONCHOSCOPY, WITH FLUOROSCOPY;  Surgeon: Alice Holman MD;  Location: [facility];  Service: Pulmonary;  Laterality: N/A;    CARDIAC SURGERY  2001    CARIDAC STENTS 7 ;  TEXARCANA       Review of patient's allergies indicates:   Allergen Reactions    Codeine Swelling, Anaphylaxis, Hives and Other (See Comments)     Throat swell, rash       No current facility-administered medications on file prior to encounter.     Current Outpatient Medications on File Prior to Encounter   Medication Sig    albuterol (PROVENTIL) 2.5 mg /3 mL (0.083 %) nebulizer solution Take 3 mLs (2.5 mg total) by nebulization every 6 (six) hours as needed for Wheezing. Rescue    fluticasone-umeclidin-vilanter (TRELEGY ELLIPTA) 200-62.5-25 mcg inhaler Inhale 1 puff into the lungs once daily.    furosemide (LASIX) 40 MG tablet Take 40 mg by mouth once daily.    gabapentin (NEURONTIN) 400 MG capsule Take 2 capsules (800 mg total) by mouth 3 (three) times daily.    metFORMIN (GLUCOPHAGE) 1000 MG tablet Take 1,000 mg by mouth 2 (two) times daily with meals.    methocarbamoL (ROBAXIN) 750 MG Tab Take 750 mg by mouth 2 (two) times daily.    metoclopramide HCl (REGLAN) 10 MG tablet Take 10 mg by mouth 2 (two) times a day.    omeprazole (PRILOSEC) 40 MG capsule Take 40 mg by mouth once daily.    ALPRAZolam (XANAX) 0.25 MG tablet Take 1 tablet (0.25 mg total) by mouth 2 (two) times daily as needed for Anxiety.    aspirin 500 MG EC tablet Take 500 mg by mouth once daily.    ibuprofen (ADVIL,MOTRIN) 800 MG tablet Take 1 tablet (800 mg total) by mouth 3 (three) times daily. for 25 days     sulfamethoxazole-trimethoprim 800-160mg (BACTRIM DS) 800-160 mg Tab Take 1 tablet by mouth 2 (two) times daily. for 21 days    [DISCONTINUED] budesonide-glycopyr-formoterol (BREZTRI AEROSPHERE) 160-9-4.8 mcg/actuation HFAA Inhale 2 puffs into the lungs 2 (two) times a day.    [DISCONTINUED] insulin detemir U-100, Levemir, (LEVEMIR FLEXPEN) 100 unit/mL (3 mL) InPn pen Inject into the skin every meal as needed.    [DISCONTINUED] oxyCODONE-acetaminophen (PERCOCET)  mg per tablet Take 1 tablet by mouth every 4 (four) hours as needed for Pain.     Family History       Problem Relation (Age of Onset)    Cancer Maternal Uncle    Diabetes Mother, Father    Heart disease Mother, Father    Hypertension Mother, Father    Stroke Mother, Father          Tobacco Use    Smoking status: Every Day     Current packs/day: 0.50     Average packs/day: 0.5 packs/day for 50.9 years (25.4 ttl pk-yrs)     Types: Cigarettes     Start date: 1/1/1973    Smokeless tobacco: Never   Substance and Sexual Activity    Alcohol use: Not Currently    Drug use: Never    Sexual activity: Not Currently     Review of Systems   Constitutional:  Negative for chills, fatigue and fever.   Respiratory:  Negative for cough and shortness of breath.    Cardiovascular:  Positive for chest pain.   Gastrointestinal:  Negative for abdominal pain, diarrhea, nausea and vomiting.   Genitourinary:  Positive for scrotal swelling and testicular pain. Negative for difficulty urinating, dysuria, flank pain and frequency.   Musculoskeletal:  Positive for back pain (chronic).   Skin:  Positive for rash (perineum).   Psychiatric/Behavioral:  Negative for agitation, behavioral problems and confusion.    All other systems reviewed and are negative.    Objective:     Vital Signs (Most Recent):  Temp: 97.9 °F (36.6 °C) (11/09/23 1508)  Pulse: (!) 56 (11/09/23 1530)  Resp: 16 (11/09/23 1530)  BP: 135/88 (11/09/23 1530)  SpO2: 95 % (11/09/23 1530) Vital Signs (24h Range):  Temp:   [97.9 °F (36.6 °C)-98.4 °F (36.9 °C)] 97.9 °F (36.6 °C)  Pulse:  [56-79] 56  Resp:  [14-20] 16  SpO2:  [92 %-99 %] 95 %  BP: (126-153)/(72-88) 135/88     Weight: 121.6 kg (268 lb)  Body mass index is 36.35 kg/m².     Physical Exam  Vitals and nursing note reviewed.   Constitutional:       General: He is not in acute distress.     Appearance: Normal appearance. He is well-developed. He is obese. He is not ill-appearing or diaphoretic.   HENT:      Head: Normocephalic and atraumatic.      Right Ear: External ear normal.      Left Ear: External ear normal.      Nose: Nose normal. No congestion or rhinorrhea.      Mouth/Throat:      Mouth: Mucous membranes are moist.      Pharynx: Oropharynx is clear. No oropharyngeal exudate or posterior oropharyngeal erythema.   Eyes:      General: No scleral icterus.     Conjunctiva/sclera: Conjunctivae normal.      Pupils: Pupils are equal, round, and reactive to light.   Neck:      Vascular: No JVD.   Cardiovascular:      Rate and Rhythm: Normal rate and regular rhythm.      Pulses: Normal pulses.      Heart sounds: Normal heart sounds. No murmur heard.  Pulmonary:      Effort: Pulmonary effort is normal. No respiratory distress.      Breath sounds: Normal breath sounds. No stridor. No wheezing, rhonchi or rales.   Abdominal:      General: Bowel sounds are normal. There is no distension.      Palpations: Abdomen is soft.      Tenderness: There is no abdominal tenderness.   Genitourinary:     Comments: Fungal rash to perineum, severe hydrocele present, able to manipulate hydrocele to expose penis. No erythema or rash to penis or glans.   Musculoskeletal:         General: No swelling or tenderness. Normal range of motion.      Cervical back: Normal range of motion and neck supple.   Skin:     General: Skin is warm and dry.      Capillary Refill: Capillary refill takes 2 to 3 seconds.      Coloration: Skin is not jaundiced or pale.      Findings: No erythema.   Neurological:       General: No focal deficit present.      Mental Status: He is alert and oriented to person, place, and time.      Cranial Nerves: No cranial nerve deficit.      Sensory: No sensory deficit.   Psychiatric:         Mood and Affect: Mood normal.         Behavior: Behavior normal.         Thought Content: Thought content normal.              CRANIAL NERVES     CN III, IV, VI   Pupils are equal, round, and reactive to light.       Significant Labs: All pertinent labs within the past 24 hours have been reviewed.  CBC:   Recent Labs   Lab 11/09/23  1117   WBC 10.19   HGB 16.2   HCT 47.0        CMP:   Recent Labs   Lab 11/09/23  1117      K 4.0      CO2 25   *   BUN 19   CREATININE 1.3   CALCIUM 9.2   PROT 6.7   ALBUMIN 3.8   BILITOT 0.4   ALKPHOS 65   AST 12   ALT 16   ANIONGAP 10     Troponin:   Recent Labs   Lab 11/09/23  1117   TROPONINI 0.017       Significant Imaging: I have reviewed all pertinent imaging results/findings within the past 24 hours.

## 2023-11-09 NOTE — PROGRESS NOTES
"[facility] Note  Staff: JAI Graf-C    PCP: N/A  Urologist:  MD Brianna    Chief Complaint: F/UP-Right Hydrocele    Subjective:        HPI: Tolu Lyles Sr. is a 53 y.o. male presents today upon arrival into clinic DEMANDING to have removal of his large Hydrocele TODAY in office.  I explained to pt during ov, that I was unable to do this for him in the office and he became angry and upset.    *Please note, he has MISSED "4" OFFICE VISITS IN OUR OFFICE TO PROCEED WITH FUTURE HYDROCELECTOMY.     HX:  Pt was last evaluated by Dr. Reed in August 2023 for right scrotal swelling.  POC by MD after last OV:    "Hydrocele-can schedule R hydrocelectomy but have to confirm he has a normal a1c, if > 7.5 high risk for infection. Also would have to hold aspirin for 7 days before. Would need clearance by cardiology. Has had 7 stents.   Call us if there is an infection starting and can start abx     Sebaceous cyst- can remove at time of his hydrocele. Gets infected intermittently. Can use bactrim antibiotic as needed.      Lung nodule- wrote Adilene Gonzalez she will get him in with pulmonlogy today. Referral placed to pulmonology. Lung nodule more urgent. Also having chest pain.      Schedule screening psa.      Referral to cardiology- h/o 7 cardiac stents  Referral to pain medicine"     PRESENTING ILLNESS:  Tolu Lyles Sr. is a 53 y.o. male who presents for right scrotal swelling. This is his initial clinic visit.     8/10/23 by Beena   Patient presents for right scrotal swelling x 1.5 years  He was treated for epididymitis initially 1.5 years ago but swelling returned after treatment.  He was told he had hydrocele and came today to discuss surgery.  He does have discomfort when walking or sitting for long periods of time.     10/18/21 PSA 0.22     Urethral stricture surgery ~10 years ago     No issues voiding. Good urinary stream. Denies dysuria, gross hematuria, flank pain, " "fever, chills, nausea or vomiting.  UA today negative  PVR 5 ml     History of kidney stones: denies  Personal or family hx of  malignancy: denies  Smoking history: never smoked     Interval history 8/17/23:  Us scrotum 8/9/23: large right and small L hydrocele          He is here to discuss his hydrocele. Very big and bothersome. Also says he has been getting intermittent epididymitis on the right. He says started after an episode of sepsis for what he thought was epdidiymitis but on exam today it's a sebaceous cyst that increases in size.He did have stricture surgery every 5-10 years He says hes been getting recurrent epididymitis as  Takes pain medication for recent accident.   On asa 500mg daily for pain   No results found for: "LABA1C", "HGBA1C"  On exam today large R hyrocele. Buried  penis due to hydrocele. Circumcised. Small sebaceous cyst R groin not currently infected.      Urine history: family history of kidney, bladder or prostate cancer:No, personal or family history of kidney stones: No,tobacco use: Yes - 1/2 to 1 ppd x  40 years, anticoagulation: Yes - asa 500mg daily   8/10/23            neg     PSA History: no fam hx of prostate cancer  10/18/21          0.25  No results found for: "PSA", "PSATOTAL", "PSAFREE", "PSAFREEPCT"      REVIEW OF SYSTEMS:  A comprehensive 10 system review was performed and is negative except as noted above in HPI    PMHx:  Past Medical History:   Diagnosis Date    Chronic back pain     COPD (chronic obstructive pulmonary disease)     Coronary artery disease     Diabetes mellitus     Lung nodule     Sleep apnea     Unspecified injury of head, initial encounter 1975     PSHx:  Past Surgical History:   Procedure Laterality Date    BRONCHOSCOPY WITH FLUOROSCOPY N/A 10/25/2023    Procedure: BRONCHOSCOPY, WITH FLUOROSCOPY;  Surgeon: Alice Holman MD;  Location: [facility];  Service: Pulmonary;  Laterality: N/A;    CARDIAC SURGERY  2001    CARIDAC STENTS 7 ;  TEXARCANA "     Allergies:  Codeine    Medications: reviewed     Objective:   There were no vitals filed for this visit.    General:WDWN in NAD  Eyes: PERRLA, normal conjunctiva  Respiratory: no increased work on breathing, clear to auscultation  Cardiovascular: regular rate and rhythm. No obvious extremity edema.  GI: palpation of masses. No tenderness. No hepatosplenomegaly to palpation.  Musculoskeletal: normal range of motion of bilateral upper extremities. Normal muscle strength and tone.  Skin: no obvious rashes or lesions. No tightening of skin noted.  Neurologic: CN grossly normal. Normal sensation.   Psychiatric: awake, alert and oriented x 3. Mood and affect normal. Cooperative.    Assessment:       1. Hydrocele, unspecified hydrocele type          Plan:     Pt stated today that he will just go to nearest ER to have swelling removed at this time and walked out.  I offered to prescribe pt Bactrim DS and Motrin today.    I reiterated that pt would need to see the UROLOGIST in order to determine if pt is a candidate for procedure in the near future.  F/u--Forwarding message to Dr. Reed for further evaluation.    [facility]: Active    Patti Juares, JAI-C

## 2023-11-09 NOTE — HPI
Tolu Lyles Sr. Is a 53 yr old male with PMHx of CAD status post stenting, chronic right hydrocele, lung cancer, chronic back pain, diabetes and COPD presenting today for right testicle swelling and pain and chest pain.  Patient states he has been experiencing intermittent chest pain over the past several weeks.  He was recently diagnosed with lung cancer and He feels that his CP is likely due to his anxiety but he also has history of cardiac stents and wanted to make sure he was not having a heart attack.  He has also been experiencing chronic right testicle swelling that has increased over the past few weeks.  He does admit to noncompliance with follow-up but states that this is due to financial constraints as he is unable to pay his co-payments and keep up with his medical bills.  Patient describes his chest pain as midsternal location, sharp quality, last for few minutes, resolves with rest, nonexertional, no aggravating or alleviating factors.  Patient also complaining of right groin pain that has been increasing and he went to his urologist office today in hopes to get this drained and he became upset when he was told that he would have to schedule surgery and therefore he left and came to the ER.  Initial troponin is negative.  Patient is currently chest pain-free.  EKG without any acute ST changes.  Patient will be admitted to hospital medicine services for further workup and management.

## 2023-11-09 NOTE — ASSESSMENT & PLAN NOTE
Body mass index is 36.35 kg/m². Morbid obesity complicates all aspects of disease management from diagnostic modalities to treatment. Weight loss encouraged and health benefits explained to patient.

## 2023-11-09 NOTE — PROGRESS NOTES
Automatic Inhaler to Nebulizer Interchange    fluticasone/umeclidinium/vilanterol (Trelegy) 200 mcg/ 62.5 mcg/ 25 mcg changed to budesonide 1 mg twice daily AND ipratropium 0.5 mg every 6 hour scheduled AND arformoterol 15 mcg twice daily per [facility] Automatic Therapeutic Substitutions Protocol.    Please contact pharmacy at extension [phone] with any questions.     Thank you,   Shiela Aguilera, PharmD

## 2023-11-09 NOTE — HPI
Tolu Lyles Sr. is a 53 y.o. male with hx of COPD, CAD s/p stent placement x 7, DM, possible lung cancer.     Presented to the ED with complaints of left sided chest pain and scrotal swelling.     Was seen in clinic today for right hydrocele. Has been seen by Dr. Reed in August and was told he needed to get his DM under better control as well as cardiac clearance.     Patient was told today in clinic these recommendation and then proceeded to the ED. He was started on Bactrim.   US shows right hydrocele with no concern for mass or epididymitis.   WBC is normal, kidney function stable.     He is being admitted for evaluation of his left sided chest pain.     He states that the hydrocele has been aspirated multiple times. He complains of significant pain and discomfort associated with the hydrocele. He wants it drained again.

## 2023-11-09 NOTE — Clinical Note
Pt was irate demanding me cut on his hydrocele in office today. Walked out stated he was going to ER. He already cancelled 4 office visits here. Wants HYdrocelectomy ASAP Please advise, thanks.

## 2023-11-10 VITALS
TEMPERATURE: 98 F | HEIGHT: 72 IN | DIASTOLIC BLOOD PRESSURE: 72 MMHG | OXYGEN SATURATION: 98 % | BODY MASS INDEX: 35.98 KG/M2 | SYSTOLIC BLOOD PRESSURE: 148 MMHG | RESPIRATION RATE: 18 BRPM | HEART RATE: 57 BPM | WEIGHT: 265.63 LBS

## 2023-11-10 PROCEDURE — 25000003 PHARM REV CODE 250: Performed by: NURSE PRACTITIONER

## 2023-11-10 PROCEDURE — G0378 HOSPITAL OBSERVATION PER HR: HCPCS

## 2023-11-10 RX ORDER — OXYCODONE HYDROCHLORIDE 5 MG/1
10 TABLET ORAL ONCE
Status: DISCONTINUED | OUTPATIENT
Start: 2023-11-10 | End: 2023-11-10 | Stop reason: HOSPADM

## 2023-11-10 RX ORDER — LIDOCAINE 50 MG/G
1 PATCH TOPICAL DAILY
Status: DISCONTINUED | OUTPATIENT
Start: 2023-11-10 | End: 2023-11-10 | Stop reason: HOSPADM

## 2023-11-10 RX ADMIN — ACETAMINOPHEN 650 MG: 325 TABLET ORAL at 02:11

## 2023-11-10 NOTE — CARE UPDATE
Notified by nurse that pt left AMA. Did not [place] pt prior to discharge as I was notified after he left.

## 2023-11-10 NOTE — NURSING
Patient complaining of back pain, prn pain medication not due at this time. Prn tylenol given & hot pack offered . Declined hot pack at this time.

## 2023-11-10 NOTE — NURSING
DAMON Walker NP  notified patient requesting additional pain medication & lidocaine patch for back pain. Prn pain medication given at 2318, ordered Q6 hrs not due at this time.notified prn tylenol given. New order received.

## 2023-11-10 NOTE — NURSING
Notified Respiratory therapist Jo-Ann that patient is requesting a breathing treatment, in which she voiced understanding and stated that she will come administer treatment to patient as ordered.

## 2023-11-10 NOTE — DISCHARGE SUMMARY
[facility]  Discharge Summary      Patient Name: Tolu Lyles Sr.  MRN: [id number]  [facility]: [id number]  Patient Class: OP- Observation  Admission Date: 11/9/2023  Hospital Length of Stay: 0 days  Discharge Date and Time: 11/10/2023  3:15 AM  Attending Physician: Liana att. providers found   Discharging Provider: Cari Marcelo NP  Primary Care Provider: Liana, Primary Doctor    Primary Care Team: Networked reference to record PCT     HPI:   Tolu Lyles Sr. Is a 53 yr old male with PMHx of CAD status post stenting, chronic right hydrocele, lung cancer, chronic back pain, diabetes and COPD presenting today for right testicle swelling and pain and chest pain.  Patient states he has been experiencing intermittent chest pain over the past several weeks.  He was recently diagnosed with lung cancer and He feels that his CP is likely due to his anxiety but he also has history of cardiac stents and wanted to make sure he was not having a heart attack.  He has also been experiencing chronic right testicle swelling that has increased over the past few weeks.  He does admit to noncompliance with follow-up but states that this is due to financial constraints as he is unable to pay his co-payments and keep up with his medical bills.  Patient describes his chest pain as midsternal location, sharp quality, last for few minutes, resolves with rest, nonexertional, no aggravating or alleviating factors.  Patient also complaining of right groin pain that has been increasing and he went to his urologist office today in hopes to get this drained and he became upset when he was told that he would have to schedule surgery and therefore he left and came to the ER.  Initial troponin is negative.  Patient is currently chest pain-free.  EKG without any acute ST changes.  Patient will be admitted to hospital medicine services for further workup and management.        * No surgery found *      Hospital  Course:   Patient left AMA .       Goals of Care Treatment Preferences:  Code Status: Full Code      Consults:   Consults (From admission, onward)        Status Ordering Provider     Inpatient consult to Urology  Once        Provider:  Alison Mcdonough MD    Completed CARI MARCELO.          No new Assessment & Plan notes have been filed under this hospital service since the last note was generated.  Service: Hospital Medicine    Final Active Diagnoses:    Diagnosis Date Noted POA    PRINCIPAL PROBLEM:  Chest pain [R07.9] 11/09/2023 Yes    Hydrocele [N43.3] 11/09/2023 Yes    Type 2 diabetes mellitus, without long-term current use of insulin [E11.9] 11/09/2023 Yes    Coronary artery disease [I25.10] 10/23/2023 Yes    BMI 35.0-35.9,adult [Z68.35] 08/23/2023 Not Applicable      Problems Resolved During this Admission:       Discharged Condition: against medical advice    Disposition: Left Against Medical Adv*    Follow Up:    Patient Instructions:   No discharge procedures on file.    Significant Diagnostic Studies: Labs:   CMP   Recent Labs   Lab 11/09/23  1117      K 4.0      CO2 25   *   BUN 19   CREATININE 1.3   CALCIUM 9.2   PROT 6.7   ALBUMIN 3.8   BILITOT 0.4   ALKPHOS 65   AST 12   ALT 16   ANIONGAP 10   , CBC   Recent Labs   Lab 11/09/23  1117   WBC 10.19   HGB 16.2   HCT 47.0      , Troponin   Recent Labs   Lab 11/09/23  1117 11/09/23  1735 11/09/23  2129   TROPONINI 0.017 <0.006 <0.006    and All labs within the past 24 hours have been reviewed    Pending Diagnostic Studies:     None         Medications:  None    Indwelling Lines/Drains at time of discharge:   Lines/Drains/Airways     None                 Time spent on the discharge of patient: 5 minutes         Cari Marcelo NP  Department of Hospital Medicine  [facility]/Surg

## 2023-11-10 NOTE — PLAN OF CARE
11/10/23 0721   Final Note   Assessment Type Final Discharge Note   Anticipated Discharge Disposition Left Against

## 2023-11-10 NOTE — NURSING
Entered room to let patient know oxycodone 10mg & lidocaine patch ordered. Patient dressed , stated he is leaving. Instructed patient I will get pain medication & patch this time. Patient stated he is leaving, pulled out IV access & proceeded to walk to . AMA papers signed & left floor.

## 2023-11-10 NOTE — SUBJECTIVE & OBJECTIVE
Past Medical History:   Diagnosis Date    Chronic back pain     COPD (chronic obstructive pulmonary disease)     Coronary artery disease     Diabetes mellitus     Lung nodule     Sleep apnea     Unspecified injury of head, initial encounter 1975       Past Surgical History:   Procedure Laterality Date    BRONCHOSCOPY WITH FLUOROSCOPY N/A 10/25/2023    Procedure: BRONCHOSCOPY, WITH FLUOROSCOPY;  Surgeon: Alice Holman MD;  Location: [facility];  Service: Pulmonary;  Laterality: N/A;    CARDIAC SURGERY  2001    CARIDAC STENTS 7 ;  TEXARCANA       Review of patient's allergies indicates:   Allergen Reactions    Codeine Swelling, Anaphylaxis, Hives and Other (See Comments)     Throat swell, rash       Family History       Problem Relation (Age of Onset)    Cancer Maternal Uncle    Diabetes Mother, Father    Heart disease Mother, Father    Hypertension Mother, Father    Stroke Mother, Father            Tobacco Use    Smoking status: Every Day     Current packs/day: 0.50     Average packs/day: 0.5 packs/day for 50.9 years (25.4 ttl pk-yrs)     Types: Cigarettes     Start date: 1/1/1973    Smokeless tobacco: Never   Substance and Sexual Activity    Alcohol use: Not Currently    Drug use: Never    Sexual activity: Not Currently       Review of Systems   Genitourinary:  Positive for scrotal swelling and testicular pain. Negative for difficulty urinating, flank pain and hematuria.       Objective:     Temp:  [97.3 °F (36.3 °C)-98.4 °F (36.9 °C)] 97.3 °F (36.3 °C)  Pulse:  [56-79] 56  Resp:  [14-20] 18  SpO2:  [92 %-99 %] 99 %  BP: (110-171)/(53-88) 110/53  Weight: 120.5 kg (265 lb 10.5 oz)  Body mass index is 36.02 kg/m².    Date 11/09/23 0700 - 11/10/23 0659   Shift 5701-6432 5972-3142 7496-5807 24 Hour Total   INTAKE   P.O.  300  300   Shift Total(mL/kg)  300(2.5)  300(2.5)   OUTPUT   Urine(mL/kg/hr)  300  300   Shift Total(mL/kg)  300(2.5)  300(2.5)   Weight (kg) 121.6 120.5 120.5 120.5          Drains       None                     Physical Exam  Constitutional:       General: He is not in acute distress.     Appearance: Normal appearance. He is not ill-appearing.   HENT:      Head: Normocephalic and atraumatic.   Eyes:      General: No scleral icterus.  Cardiovascular:      Rate and Rhythm: Normal rate and regular rhythm.   Pulmonary:      Effort: Pulmonary effort is normal. No respiratory distress.   Abdominal:      General: There is no distension.   Genitourinary:     Comments: There is an orange sized right sided hydrocele. No left hydrocele, testicle is normal. Penis is mildly buried but easily exposed. No erythema.   Skin:     Coloration: Skin is not jaundiced.   Neurological:      Mental Status: He is alert and oriented to person, place, and time.   Psychiatric:         Mood and Affect: Mood normal.         Behavior: Behavior normal.         Thought Content: Thought content normal.          Significant Labs:    BMP:  Recent Labs   Lab 11/06/23  1809 11/09/23  1117    139   K 4.2 4.0    104   CO2 28 25   BUN 26* 19   CREATININE 1.4 1.3   CALCIUM 9.3 9.2       CBC:  Recent Labs   Lab 11/06/23  1809 11/09/23  1117   WBC 10.68 10.19   HGB 15.3 16.2   HCT 46.1 47.0    224       Urine Studies:   Recent Labs   Lab 11/09/23  1546   COLORU Yellow   APPEARANCEUA Clear   PHUR 7.0   SPECGRAV >1.030*   PROTEINUA Trace*   GLUCUA Negative   KETONESU Negative   BILIRUBINUA Negative   OCCULTUA Negative   NITRITE Negative   UROBILINOGEN Negative   LEUKOCYTESUR Negative     All pertinent labs results from the past 24 hours have been reviewed.    Significant Imaging:  All pertinent imaging results/findings from the past 24 hours have been reviewed.

## 2023-11-10 NOTE — CONSULTS
[facility]/Surg  Urology  Consult Note    Patient Name: Tolu Lyles Sr.  MRN: [id number]  Admission Date: 11/9/2023  Hospital Length of Stay: 0   Code Status: Full Code   Attending Provider: Chidi Goss MD   Consulting Provider: Alison Mcdonough MD  Primary Care Physician: No, Primary Doctor  Principal Problem:Chest pain    Inpatient consult to Urology  Consult performed by: Alison Mcdonough MD  Consult ordered by: Cari Marcelo NP  Reason for consult: right hydrocele           Subjective:     HPI:  Tolu Lyles Sr. is a 53 y.o. male with hx of COPD, CAD s/p stent placement x 7, DM, possible lung cancer.     Presented to the ED with complaints of left sided chest pain and scrotal swelling.     Was seen in clinic today for right hydrocele. Has been seen by Dr. Reed in August and was told he needed to get his DM under better control as well as cardiac clearance.     Patient was told today in clinic these recommendation and then proceeded to the ED. He was started on Bactrim.   US shows right hydrocele with no concern for mass or epididymitis.   WBC is normal, kidney function stable.     He is being admitted for evaluation of his left sided chest pain.     He states that the hydrocele has been aspirated multiple times. He complains of significant pain and discomfort associated with the hydrocele. He wants it drained again.       Past Medical History:   Diagnosis Date    Chronic back pain     COPD (chronic obstructive pulmonary disease)     Coronary artery disease     Diabetes mellitus     Lung nodule     Sleep apnea     Unspecified injury of head, initial encounter 1975       Past Surgical History:   Procedure Laterality Date    BRONCHOSCOPY WITH FLUOROSCOPY N/A 10/25/2023    Procedure: BRONCHOSCOPY, WITH FLUOROSCOPY;  Surgeon: Alice Holman MD;  Location: [facility];  Service: Pulmonary;  Laterality: N/A;    CARDIAC SURGERY  2001    CARIDAC STENTS 7 ;  TEXARCANA        Review of patient's allergies indicates:   Allergen Reactions    Codeine Swelling, Anaphylaxis, Hives and Other (See Comments)     Throat swell, rash       Family History       Problem Relation (Age of Onset)    Cancer Maternal Uncle    Diabetes Mother, Father    Heart disease Mother, Father    Hypertension Mother, Father    Stroke Mother, Father            Tobacco Use    Smoking status: Every Day     Current packs/day: 0.50     Average packs/day: 0.5 packs/day for 50.9 years (25.4 ttl pk-yrs)     Types: Cigarettes     Start date: 1/1/1973    Smokeless tobacco: Never   Substance and Sexual Activity    Alcohol use: Not Currently    Drug use: Never    Sexual activity: Not Currently       Review of Systems   Genitourinary:  Positive for scrotal swelling and testicular pain. Negative for difficulty urinating, flank pain and hematuria.       Objective:     Temp:  [97.3 °F (36.3 °C)-98.4 °F (36.9 °C)] 97.3 °F (36.3 °C)  Pulse:  [56-79] 56  Resp:  [14-20] 18  SpO2:  [92 %-99 %] 99 %  BP: (110-171)/(53-88) 110/53  Weight: 120.5 kg (265 lb 10.5 oz)  Body mass index is 36.02 kg/m².    Date 11/09/23 0700 - 11/10/23 0659   Shift 4607-5503 5095-3418 4125-6966 24 Hour Total   INTAKE   P.O.  300  300   Shift Total(mL/kg)  300(2.5)  300(2.5)   OUTPUT   Urine(mL/kg/hr)  300  300   Shift Total(mL/kg)  300(2.5)  300(2.5)   Weight (kg) 121.6 120.5 120.5 120.5          Drains       None                    Physical Exam  Constitutional:       General: He is not in acute distress.     Appearance: Normal appearance. He is not ill-appearing.   HENT:      Head: Normocephalic and atraumatic.   Eyes:      General: No scleral icterus.  Cardiovascular:      Rate and Rhythm: Normal rate and regular rhythm.   Pulmonary:      Effort: Pulmonary effort is normal. No respiratory distress.   Abdominal:      General: There is no distension.   Genitourinary:     Comments: There is an orange sized right sided hydrocele. No left hydrocele,  testicle is normal. Penis is mildly buried but easily exposed. No erythema.   Skin:     Coloration: Skin is not jaundiced.   Neurological:      Mental Status: He is alert and oriented to person, place, and time.   Psychiatric:         Mood and Affect: Mood normal.         Behavior: Behavior normal.         Thought Content: Thought content normal.          Significant Labs:    BMP:  Recent Labs   Lab 11/06/23  1809 11/09/23  1117    139   K 4.2 4.0    104   CO2 28 25   BUN 26* 19   CREATININE 1.4 1.3   CALCIUM 9.3 9.2       CBC:  Recent Labs   Lab 11/06/23  1809 11/09/23  1117   WBC 10.68 10.19   HGB 15.3 16.2   HCT 46.1 47.0    224       Urine Studies:   Recent Labs   Lab 11/09/23  1546   COLORU Yellow   APPEARANCEUA Clear   PHUR 7.0   SPECGRAV >1.030*   PROTEINUA Trace*   GLUCUA Negative   KETONESU Negative   BILIRUBINUA Negative   OCCULTUA Negative   NITRITE Negative   UROBILINOGEN Negative   LEUKOCYTESUR Negative     All pertinent labs results from the past 24 hours have been reviewed.    Significant Imaging:  All pertinent imaging results/findings from the past 24 hours have been reviewed.      Assessment and Plan:     Hydrocele  - This is an uncomplicated right hydrocele   - There is no concern for infection   - Recommended scrotal support for discomfort   - Advised against aspiration of the hydrocele as this is not definitive treatment and runs risk of infection and possible testicular injury   - He needs cardiac clearance for anesthesia and he can undergo hydrocelectomy with Dr. Reed as is outlined in her note from clinic  - Discussed that his treatment for lung cancer is more important than his hydrocele at this point         VTE Risk Mitigation (From admission, onward)         Ordered     enoxaparin injection 40 mg  Daily         11/09/23 1635     IP VTE HIGH RISK PATIENT  Once         11/09/23 1635     Place sequential compression device  Until discontinued         11/09/23 1635                 Thank you for your consult. I will sign off. Please contact us if you have any additional questions.    Alison Mcdonough MD  Urology  [facility]/Surg

## 2023-11-10 NOTE — ASSESSMENT & PLAN NOTE
- This is an uncomplicated right hydrocele   - There is no concern for infection   - Recommended scrotal support for discomfort   - Advised against aspiration of the hydrocele as this is not definitive treatment and runs risk of infection and possible testicular injury   - He needs cardiac clearance for anesthesia and he can undergo hydrocelectomy with Dr. Reed as is outlined in her note from clinic  - Discussed that his treatment for lung cancer is more important than his hydrocele at this point

## 2023-11-13 ENCOUNTER — TELEPHONE (OUTPATIENT)
Dept: UROLOGY | Facility: CLINIC | Age: 53
End: 2023-11-13
Payer: MEDICARE

## 2023-11-13 NOTE — TELEPHONE ENCOUNTER
----- Message from JAI Mejias sent at 11/9/2023 10:05 AM CST -----  Pt was destinyate demanding me cut on his hydrocele in office today.  Walked out stated he was going to ER.  He already cancelled 4 office visits here.  Wants HYdrocelectomy ASAP  Please advise, thanks.

## 2023-11-13 NOTE — TELEPHONE ENCOUNTER
[facility]    Her recommendations which I agree with  - This is an uncomplicated right hydrocele   - There is no concern for infection   - Recommended scrotal support for discomfort   - Advised against aspiration of the hydrocele as this is not definitive treatment and runs risk of infection and possible testicular injury   - He needs cardiac clearance for anesthesia and he can undergo hydrocelectomy with Dr. Reed as is outlined in her note from clinic  - Discussed that his treatment for lung cancer is more important than his hydrocele at this point

## 2023-11-14 NOTE — TELEPHONE ENCOUNTER
Spoke with patient informed him of recommendations. Patient is frustrated because hydrocele was not drained while he was in the hospital. Patient states he no longer wants to see  and disconnected call.

## 2023-11-22 ENCOUNTER — TELEPHONE (OUTPATIENT)
Dept: PULMONOLOGY | Facility: CLINIC | Age: 53
End: 2023-11-22
Payer: MEDICARE

## 2023-11-22 NOTE — TELEPHONE ENCOUNTER
Faxed last notes & path report   ----- Message from Chloe Maharaj sent at 11/22/2023 11:09 AM CST -----  Contact: Khushi      Name of Caller:  Khushi       Best Call Back Number:  [phone]    Additional Information:  Requesting clinical notes to support request for genetic testing.  Fax #: [phone]    Auth pending #: [id number]

## 2023-12-04 ENCOUNTER — TELEPHONE (OUTPATIENT)
Dept: PULMONOLOGY | Facility: CLINIC | Age: 53
End: 2023-12-04
Payer: MEDICARE

## 2023-12-04 NOTE — TELEPHONE ENCOUNTER
Placed a call to the pt in regards to the pt's insurance [employer] with MsHe Corazon. Left a message for Ms. Corazon to call back.

## 2023-12-04 NOTE — TELEPHONE ENCOUNTER
----- Message from Erika Costello sent at 12/4/2023  2:52 PM CST -----  Type: Needs Medical Advice  Who Called:  Corazon Short  Best Call Back Number: [phone] fax [phone]  Additional Information: Requesting the most recent chart notes of the pt, there is also a peer to peer due by 12/6 at 1pm pl call bk and advise thanks

## 2023-12-12 ENCOUNTER — TELEPHONE (OUTPATIENT)
Dept: PULMONOLOGY | Facility: CLINIC | Age: 53
End: 2023-12-12

## 2023-12-13 ENCOUNTER — TELEPHONE (OUTPATIENT)
Dept: UROLOGY | Facility: CLINIC | Age: 53
End: 2023-12-13
Payer: MEDICARE

## 2023-12-13 ENCOUNTER — OFFICE VISIT (OUTPATIENT)
Dept: PULMONOLOGY | Facility: CLINIC | Age: 53
End: 2023-12-13
Payer: MEDICARE

## 2023-12-13 VITALS
SYSTOLIC BLOOD PRESSURE: 126 MMHG | BODY MASS INDEX: 37.13 KG/M2 | HEART RATE: 79 BPM | OXYGEN SATURATION: 97 % | WEIGHT: 273.81 LBS | DIASTOLIC BLOOD PRESSURE: 68 MMHG

## 2023-12-13 DIAGNOSIS — E66.01 SEVERE OBESITY (BMI 35.0-39.9) WITH COMORBIDITY: ICD-10-CM

## 2023-12-13 DIAGNOSIS — I25.10 CORONARY ARTERY DISEASE, UNSPECIFIED VESSEL OR LESION TYPE, UNSPECIFIED WHETHER ANGINA PRESENT, UNSPECIFIED WHETHER NATIVE OR TRANSPLANTED HEART: ICD-10-CM

## 2023-12-13 DIAGNOSIS — D3A.00 CARCINOID TUMOR, UNSPECIFIED SITE, UNSPECIFIED WHETHER MALIGNANT: Primary | ICD-10-CM

## 2023-12-13 PROCEDURE — 3078F PR MOST RECENT DIASTOLIC BLOOD PRESSURE < 80 MM HG: ICD-10-PCS | Mod: CPTII,S$GLB,, | Performed by: INTERNAL MEDICINE

## 2023-12-13 PROCEDURE — 3051F PR MOST RECENT HEMOGLOBIN A1C LEVEL 7.0 - < 8.0%: ICD-10-PCS | Mod: CPTII,S$GLB,, | Performed by: INTERNAL MEDICINE

## 2023-12-13 PROCEDURE — 99214 OFFICE O/P EST MOD 30 MIN: CPT | Mod: S$GLB,,, | Performed by: INTERNAL MEDICINE

## 2023-12-13 PROCEDURE — 3074F PR MOST RECENT SYSTOLIC BLOOD PRESSURE < 130 MM HG: ICD-10-PCS | Mod: CPTII,S$GLB,, | Performed by: INTERNAL MEDICINE

## 2023-12-13 PROCEDURE — 3074F SYST BP LT 130 MM HG: CPT | Mod: CPTII,S$GLB,, | Performed by: INTERNAL MEDICINE

## 2023-12-13 PROCEDURE — 3051F HG A1C>EQUAL 7.0%<8.0%: CPT | Mod: CPTII,S$GLB,, | Performed by: INTERNAL MEDICINE

## 2023-12-13 PROCEDURE — 1159F PR MEDICATION LIST DOCUMENTED IN MEDICAL RECORD: ICD-10-PCS | Mod: CPTII,S$GLB,, | Performed by: INTERNAL MEDICINE

## 2023-12-13 PROCEDURE — 4010F PR ACE/ARB THEARPY RXD/TAKEN: ICD-10-PCS | Mod: CPTII,S$GLB,, | Performed by: INTERNAL MEDICINE

## 2023-12-13 PROCEDURE — 3008F BODY MASS INDEX DOCD: CPT | Mod: CPTII,S$GLB,, | Performed by: INTERNAL MEDICINE

## 2023-12-13 PROCEDURE — 3078F DIAST BP <80 MM HG: CPT | Mod: CPTII,S$GLB,, | Performed by: INTERNAL MEDICINE

## 2023-12-13 PROCEDURE — 1159F MED LIST DOCD IN RCRD: CPT | Mod: CPTII,S$GLB,, | Performed by: INTERNAL MEDICINE

## 2023-12-13 PROCEDURE — 4010F ACE/ARB THERAPY RXD/TAKEN: CPT | Mod: CPTII,S$GLB,, | Performed by: INTERNAL MEDICINE

## 2023-12-13 PROCEDURE — 99214 PR OFFICE/OUTPT VISIT, EST, LEVL IV, 30-39 MIN: ICD-10-PCS | Mod: S$GLB,,, | Performed by: INTERNAL MEDICINE

## 2023-12-13 PROCEDURE — 3008F PR BODY MASS INDEX (BMI) DOCUMENTED: ICD-10-PCS | Mod: CPTII,S$GLB,, | Performed by: INTERNAL MEDICINE

## 2023-12-13 NOTE — TELEPHONE ENCOUNTER
Spoke with patient informed him of recommendations. Patient declined appointment and disconnected call.

## 2023-12-13 NOTE — PROGRESS NOTES
SUBJECTIVE:    Patient ID: Tolu Lyles Sr. is a 53 y.o. male.    Chief Complaint: Follow-up (Follow up Nodule of middle lobe of right lung)    HPI The patient has a carcinoid in the RML.He has still not had his PFT's.  He states he lost his phone.  He is still distraught about the fact that is hydrocele has not been drained.  He does have cardiology clearance for this.  I promised that we called Dr. Reed and see if she would try to deal with his hydrocele.  He also does not have pain medicine and his primary care would not give it to him.  She did refer him to pain management clinic who is not even accepted him as a patient to begin scheduling an appointment yet.  I will try to reach out to her and see if she can either tell me the number for the pain management Clinic or if she could help her patient.    Past Medical History:   Diagnosis Date    Chronic back pain     COPD (chronic obstructive pulmonary disease)     Coronary artery disease     Diabetes mellitus     Lung nodule     Sleep apnea     Unspecified injury of head, initial encounter 1975     Past Surgical History:   Procedure Laterality Date    BRONCHOSCOPY WITH FLUOROSCOPY N/A 10/25/2023    Procedure: BRONCHOSCOPY, WITH FLUOROSCOPY;  Surgeon: Alice Holman MD;  Location: [facility];  Service: Pulmonary;  Laterality: N/A;    CARDIAC SURGERY  2001    CARIDAC STENTS 7 ;  TEXARCANA     Family History   Problem Relation Age of Onset    Diabetes Mother     Hypertension Mother     Heart disease Mother     Stroke Mother     Diabetes Father     Hypertension Father     Heart disease Father     Stroke Father     Cancer Maternal Uncle         Social History:   Marital Status:   Occupation: Data Unavailable  Alcohol History:  reports that he does not currently use alcohol.  Tobacco History:  reports that he has been smoking cigarettes. He started smoking about 50 years ago. He has a 25.5 pack-year smoking history. He has never used smokeless  tobacco.  Drug History:  reports no history of drug use.    Review of patient's allergies indicates:   Allergen Reactions    Codeine Swelling, Anaphylaxis, Hives and Other (See Comments)     Throat swell, rash       Current Outpatient Medications   Medication Sig Dispense Refill    albuterol (PROVENTIL) 2.5 mg /3 mL (0.083 %) nebulizer solution Take 3 mLs (2.5 mg total) by nebulization every 6 (six) hours as needed for Wheezing. Rescue 120 mL 3    furosemide (LASIX) 40 MG tablet Take 40 mg by mouth once daily.      gabapentin (NEURONTIN) 400 MG capsule Take 2 capsules (800 mg total) by mouth 3 (three) times daily. 180 capsule 0    metFORMIN (GLUCOPHAGE) 1000 MG tablet Take 1,000 mg by mouth 2 (two) times daily with meals.      methocarbamoL (ROBAXIN) 750 MG Tab Take 750 mg by mouth 2 (two) times daily.      metoclopramide HCl (REGLAN) 10 MG tablet Take 10 mg by mouth 2 (two) times a day.      omeprazole (PRILOSEC) 40 MG capsule Take 40 mg by mouth once daily.      ALPRAZolam (XANAX) 0.25 MG tablet Take 1 tablet (0.25 mg total) by mouth 2 (two) times daily as needed for Anxiety. 10 tablet 0    aspirin 500 MG EC tablet Take 500 mg by mouth once daily.      fluticasone-umeclidin-vilanter (TRELEGY ELLIPTA) 200-62.5-25 mcg inhaler Inhale 1 puff into the lungs once daily. (Patient not taking: Reported on 12/13/2023) 60 each 0     No current facility-administered medications for this visit.       Alpha-1 Antitrypsin:  Last PFT:   Last CT:    Review of Systems  General: Feeling terrible because he is in pain  Eyes: Vision is good.  ENT:  No sinusitis or pharyngitis.   Heart:: No chest pain or palpitations.  Lungs: No cough, sputum, or wheezing.  GI: No Nausea, vomiting, constipation, diarrhea, or reflux.  :  The patient's hydrocele is very painful to him and precludes him from sitting back in a chair.  Musculoskeletal:  He complains of severe back pain.  Skin: No lesions or rashes.  Neuro:  He complains of severe  neuropathy in his hands and feet.  He takes high-dose gabapentin to help this.  Lymph: No edema or adenopathy.  Psych: No anxiety or depression.  Endo: No weight change.    OBJECTIVE:      /68 (BP Location: Right arm, Patient Position: Sitting, BP Method: Medium (Manual))   Pulse 79   Wt 124.2 kg (273 lb 12.8 oz)   SpO2 97%   BMI 37.13 kg/m²     Physical Exam  GENERAL: Older appearing obese patient in no distress.  HEENT: Pupils equal and reactive. Extraocular movements intact. Nose intact.  Pharynx moist.  NECK: Supple.   HEART: Regular rate and rhythm. No murmur or gallop auscultated.  LUNGS:  There is an expiratory wheeze over the right mid lung fields anteriorly.. Lung excursion symmetrical. No change in fremitus.   ABDOMEN: Bowel sounds present. Non-tender, no masses palpated.  EXTREMITIES: Normal muscle tone and joint movement, no cyanosis or clubbing.   LYMPHATICS: No adenopathy palpated, no edema.  SKIN: Dry, intact, no lesions.   NEURO: Cranial nerves II-XII intact. Motor strength 5/5 bilaterally, upper and lower extremities.  PSYCH: Appropriate affect.    Assessment:       1. Carcinoid tumor, unspecified site, unspecified whether malignant    2. Severe obesity (BMI 35.0-39.9) with comorbidity    3. Coronary artery disease, unspecified vessel or lesion type, unspecified whether angina present, unspecified whether native or transplanted heart            Plan:       Carcinoid tumor, unspecified site, unspecified whether malignant  -     Complete PFT with bronchodilator; Future    Severe obesity (BMI 35.0-39.9) with comorbidity    Coronary artery disease, unspecified vessel or lesion type, unspecified whether angina present, unspecified whether native or transplanted heart           Follow up in about 3 months (around 3/13/2024).    Call Dr. Reed for patient  Call his nurse practitioner, PCP with regards to pain management  PFT's  Call patient with results of PFT's  Hopefully can schedule for  CV surgery after this

## 2023-12-13 NOTE — TELEPHONE ENCOUNTER
Katelyn please see if you can get him in for th new pt slot Tuesday the 15th at 1015pm to DISCUSS removing his hydrocele. I spoke with , I WILL NOT drain with a needle. He can get an infection, it will only recur, and it is against urology recommendations. If he wants it drained I do not recommend he does then he will need to see the primary care physician that did this for him.    I saw the note from cardiology and it looks like he was cleared for this procedure.  My tentative date is January 3rd but I need to see him in person 1st.

## 2023-12-18 ENCOUNTER — HOSPITAL ENCOUNTER (OUTPATIENT)
Dept: PULMONOLOGY | Facility: HOSPITAL | Age: 53
Discharge: HOME | End: 2023-12-18
Attending: INTERNAL MEDICINE
Payer: MEDICARE

## 2023-12-18 DIAGNOSIS — D3A.00 CARCINOID TUMOR, UNSPECIFIED SITE, UNSPECIFIED WHETHER MALIGNANT: ICD-10-CM

## 2023-12-18 PROCEDURE — 94060 EVALUATION OF WHEEZING: CPT

## 2023-12-18 PROCEDURE — 94727 GAS DIL/WSHOT DETER LNG VOL: CPT

## 2023-12-18 PROCEDURE — 94729 DIFFUSING CAPACITY: CPT

## 2023-12-18 PROCEDURE — 94010 BREATHING CAPACITY TEST: CPT | Mod: XB

## 2023-12-19 ENCOUNTER — TELEPHONE (OUTPATIENT)
Dept: PULMONOLOGY | Facility: CLINIC | Age: 53
End: 2023-12-19

## 2023-12-19 NOTE — TELEPHONE ENCOUNTER
PFT shows moderate obstruction with good response to bronchodilator in small airway,  air trapping, no diffusion defect.

## 2024-03-27 ENCOUNTER — TELEPHONE (OUTPATIENT)
Dept: PULMONOLOGY | Facility: CLINIC | Age: 54
End: 2024-03-27

## 2024-03-27 NOTE — TELEPHONE ENCOUNTER
River last note in December of 2023 stated she gave pt dr red pemberton to pt for referral.  I do not see any documentation after that.  I do not see a referral either.

## 2024-03-27 NOTE — TELEPHONE ENCOUNTER
----- Message from Wilda Heller sent at 3/27/2024 10:10 AM CDT -----  Type: Needs Medical Advice  Who Called:  pt  Symptoms (please be specific):  pt said he was told he needed lung surgery by the --please call and advise--he was calling to schedule surgery--  Best Call Back Number: [phone] (home)     Additional Information: thank you

## 2024-03-30 ENCOUNTER — HOSPITAL ENCOUNTER (EMERGENCY)
Facility: HOSPITAL | Age: 54
Discharge: LEFT BEFORE DISCHARGE | End: 2024-03-31
Attending: STUDENT IN AN ORGANIZED HEALTH CARE EDUCATION/TRAINING PROGRAM
Payer: MEDICARE

## 2024-03-30 DIAGNOSIS — G89.29 CHRONIC MIDLINE LOW BACK PAIN WITH RIGHT-SIDED SCIATICA: Primary | ICD-10-CM

## 2024-03-30 DIAGNOSIS — M54.41 CHRONIC MIDLINE LOW BACK PAIN WITH RIGHT-SIDED SCIATICA: Primary | ICD-10-CM

## 2024-03-30 DIAGNOSIS — R07.9 CHEST PAIN: ICD-10-CM

## 2024-03-30 LAB
BASOPHILS # BLD AUTO: 0.06 K/UL (ref 0–0.2)
BASOPHILS NFR BLD: 0.7 % (ref 0–1.9)
BNP SERPL-MCNC: 227 PG/ML (ref 0–99)
DIFFERENTIAL METHOD BLD: ABNORMAL
EOSINOPHIL # BLD AUTO: 0.2 K/UL (ref 0–0.5)
EOSINOPHIL NFR BLD: 2.6 % (ref 0–8)
ERYTHROCYTE [DISTWIDTH] IN BLOOD BY AUTOMATED COUNT: 14.5 % (ref 11.5–14.5)
HCT VFR BLD AUTO: 36.8 % (ref 40–54)
HGB BLD-MCNC: 11.8 G/DL (ref 14–18)
IMM GRANULOCYTES # BLD AUTO: 0.05 K/UL (ref 0–0.04)
IMM GRANULOCYTES NFR BLD AUTO: 0.5 % (ref 0–0.5)
LYMPHOCYTES # BLD AUTO: 2 K/UL (ref 1–4.8)
LYMPHOCYTES NFR BLD: 21.6 % (ref 18–48)
MCH RBC QN AUTO: 32.4 PG (ref 27–31)
MCHC RBC AUTO-ENTMCNC: 32.1 G/DL (ref 32–36)
MCV RBC AUTO: 101 FL (ref 82–98)
MONOCYTES # BLD AUTO: 0.4 K/UL (ref 0.3–1)
MONOCYTES NFR BLD: 4.8 % (ref 4–15)
NEUTROPHILS # BLD AUTO: 6.4 K/UL (ref 1.8–7.7)
NEUTROPHILS NFR BLD: 69.8 % (ref 38–73)
NRBC BLD-RTO: 0 /100 WBC
PLATELET # BLD AUTO: 263 K/UL (ref 150–450)
PMV BLD AUTO: 10.4 FL (ref 9.2–12.9)
RBC # BLD AUTO: 3.64 M/UL (ref 4.6–6.2)
WBC # BLD AUTO: 9.16 K/UL (ref 3.9–12.7)

## 2024-03-30 PROCEDURE — 85025 COMPLETE CBC W/AUTO DIFF WBC: CPT | Performed by: STUDENT IN AN ORGANIZED HEALTH CARE EDUCATION/TRAINING PROGRAM

## 2024-03-30 PROCEDURE — 25000003 PHARM REV CODE 250: Performed by: STUDENT IN AN ORGANIZED HEALTH CARE EDUCATION/TRAINING PROGRAM

## 2024-03-30 PROCEDURE — 83880 ASSAY OF NATRIURETIC PEPTIDE: CPT | Performed by: STUDENT IN AN ORGANIZED HEALTH CARE EDUCATION/TRAINING PROGRAM

## 2024-03-30 PROCEDURE — 93005 ELECTROCARDIOGRAM TRACING: CPT | Performed by: GENERAL PRACTICE

## 2024-03-30 PROCEDURE — 93010 ELECTROCARDIOGRAM REPORT: CPT | Mod: ,,, | Performed by: GENERAL PRACTICE

## 2024-03-30 PROCEDURE — 99285 EMERGENCY DEPT VISIT HI MDM: CPT | Mod: 25

## 2024-03-30 RX ORDER — ASPIRIN 325 MG
325 TABLET ORAL
Status: COMPLETED | OUTPATIENT
Start: 2024-03-30 | End: 2024-03-30

## 2024-03-30 RX ADMIN — ASPIRIN 325 MG ORAL TABLET 325 MG: 325 PILL ORAL at 10:03

## 2024-03-31 VITALS
DIASTOLIC BLOOD PRESSURE: 64 MMHG | OXYGEN SATURATION: 99 % | RESPIRATION RATE: 18 BRPM | WEIGHT: 250 LBS | TEMPERATURE: 98 F | HEART RATE: 72 BPM | BODY MASS INDEX: 33.13 KG/M2 | SYSTOLIC BLOOD PRESSURE: 150 MMHG | HEIGHT: 73 IN

## 2024-03-31 LAB
ALBUMIN SERPL BCP-MCNC: 3.6 G/DL (ref 3.5–5.2)
ALP SERPL-CCNC: 59 U/L (ref 55–135)
ALT SERPL W/O P-5'-P-CCNC: 8 U/L (ref 10–44)
ANION GAP SERPL CALC-SCNC: 4 MMOL/L (ref 8–16)
AST SERPL-CCNC: 10 U/L (ref 10–40)
BILIRUB SERPL-MCNC: 0.3 MG/DL (ref 0.1–1)
BUN SERPL-MCNC: 22 MG/DL (ref 6–20)
CALCIUM SERPL-MCNC: 9.1 MG/DL (ref 8.7–10.5)
CHLORIDE SERPL-SCNC: 102 MMOL/L (ref 95–110)
CO2 SERPL-SCNC: 34 MMOL/L (ref 23–29)
CREAT SERPL-MCNC: 1.1 MG/DL (ref 0.5–1.4)
EST. GFR  (NO RACE VARIABLE): >60 ML/MIN/1.73 M^2
GLUCOSE SERPL-MCNC: 134 MG/DL (ref 70–110)
MAGNESIUM SERPL-MCNC: 1.9 MG/DL (ref 1.6–2.6)
POTASSIUM SERPL-SCNC: 4.8 MMOL/L (ref 3.5–5.1)
PROT SERPL-MCNC: 6 G/DL (ref 6–8.4)
SODIUM SERPL-SCNC: 140 MMOL/L (ref 136–145)
TROPONIN I SERPL HS-MCNC: 10.4 PG/ML (ref 0–14.9)

## 2024-03-31 PROCEDURE — 83735 ASSAY OF MAGNESIUM: CPT | Performed by: STUDENT IN AN ORGANIZED HEALTH CARE EDUCATION/TRAINING PROGRAM

## 2024-03-31 PROCEDURE — 84484 ASSAY OF TROPONIN QUANT: CPT | Performed by: STUDENT IN AN ORGANIZED HEALTH CARE EDUCATION/TRAINING PROGRAM

## 2024-03-31 PROCEDURE — 80053 COMPREHEN METABOLIC PANEL: CPT | Performed by: STUDENT IN AN ORGANIZED HEALTH CARE EDUCATION/TRAINING PROGRAM

## 2024-03-31 PROCEDURE — 36415 COLL VENOUS BLD VENIPUNCTURE: CPT | Performed by: STUDENT IN AN ORGANIZED HEALTH CARE EDUCATION/TRAINING PROGRAM

## 2024-03-31 PROCEDURE — 25000003 PHARM REV CODE 250: Performed by: STUDENT IN AN ORGANIZED HEALTH CARE EDUCATION/TRAINING PROGRAM

## 2024-03-31 RX ORDER — ACETAMINOPHEN 500 MG
1000 TABLET ORAL
Status: COMPLETED | OUTPATIENT
Start: 2024-03-31 | End: 2024-03-31

## 2024-03-31 RX ORDER — OXYCODONE HYDROCHLORIDE 5 MG/1
10 TABLET ORAL ONCE
Status: COMPLETED | OUTPATIENT
Start: 2024-03-31 | End: 2024-03-31

## 2024-03-31 RX ADMIN — OXYCODONE HYDROCHLORIDE 10 MG: 5 TABLET ORAL at 12:03

## 2024-03-31 RX ADMIN — ACETAMINOPHEN 1000 MG: 500 TABLET ORAL at 12:03
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
## 2024-04-01 ENCOUNTER — TELEPHONE (OUTPATIENT)
Dept: PULMONOLOGY | Facility: CLINIC | Age: 54
End: 2024-04-01

## 2024-04-01 DIAGNOSIS — D3A.00 CARCINOID TUMOR, UNSPECIFIED SITE, UNSPECIFIED WHETHER MALIGNANT: Primary | ICD-10-CM

## 2024-04-01 NOTE — TELEPHONE ENCOUNTER
Tried to call patient to see why he had not call Dr. Narayanan for an appointment after all this time.  Have placed Cardiothoracic consult but now can not consult Dr. Narayanan directly because of epic.  Could not leave a message on the patient's phone and he did not answer it.

## 2024-04-02 NOTE — TELEPHONE ENCOUNTER
----- Message from Kenny Mata sent at 4/2/2024 10:12 AM CDT -----  Mr. Lyles called please give him a call [phone] regarding a referral to Dr. Narayanan.  Thank you

## 2024-04-02 NOTE — TELEPHONE ENCOUNTER
Pt returned your call and said that he was kennedy locked up and could not schedule any appointments. But he is now free and would like to get layne'd   I gave him dr moon number again.

## 2024-04-21 LAB
OHS QRS DURATION: 92 MS
OHS QTC CALCULATION: 412 MS

## 2024-05-26 NOTE — TELEPHONE ENCOUNTER
The patient's pathology is consistent with carcinoid.  There is no evidence of metastatic disease.  Tried to reach the patient to talk to him about his diagnosis but no answer and unable to leave a message.  
Home

## 2024-08-28 ENCOUNTER — HOSPITAL ENCOUNTER (EMERGENCY)
Facility: HOSPITAL | Age: 54
Discharge: LEFT BEFORE DISCHARGE | End: 2024-08-28
Attending: EMERGENCY MEDICINE
Payer: MEDICARE

## 2024-08-28 VITALS
TEMPERATURE: 99 F | OXYGEN SATURATION: 100 % | HEART RATE: 58 BPM | SYSTOLIC BLOOD PRESSURE: 140 MMHG | BODY MASS INDEX: 33.13 KG/M2 | RESPIRATION RATE: 16 BRPM | HEIGHT: 73 IN | DIASTOLIC BLOOD PRESSURE: 69 MMHG | WEIGHT: 250 LBS

## 2024-08-28 DIAGNOSIS — M86.171 ACUTE OSTEOMYELITIS OF TOE OF RIGHT FOOT: Primary | ICD-10-CM

## 2024-08-28 LAB
ALBUMIN SERPL BCP-MCNC: 3.7 G/DL (ref 3.5–5.2)
ALP SERPL-CCNC: 64 U/L (ref 55–135)
ALT SERPL W/O P-5'-P-CCNC: 9 U/L (ref 10–44)
ANION GAP SERPL CALC-SCNC: 11 MMOL/L (ref 8–16)
AST SERPL-CCNC: 11 U/L (ref 10–40)
BASOPHILS # BLD AUTO: 0.07 K/UL (ref 0–0.2)
BASOPHILS NFR BLD: 0.6 % (ref 0–1.9)
BILIRUB SERPL-MCNC: 0.6 MG/DL (ref 0.1–1)
BUN SERPL-MCNC: 35 MG/DL (ref 6–20)
CALCIUM SERPL-MCNC: 9.5 MG/DL (ref 8.7–10.5)
CHLORIDE SERPL-SCNC: 104 MMOL/L (ref 95–110)
CO2 SERPL-SCNC: 19 MMOL/L (ref 23–29)
CREAT SERPL-MCNC: 1.6 MG/DL (ref 0.5–1.4)
CRP SERPL-MCNC: 17.9 MG/L (ref 0–8.2)
DIFFERENTIAL METHOD BLD: ABNORMAL
EOSINOPHIL # BLD AUTO: 0.1 K/UL (ref 0–0.5)
EOSINOPHIL NFR BLD: 1 % (ref 0–8)
ERYTHROCYTE [DISTWIDTH] IN BLOOD BY AUTOMATED COUNT: 14.5 % (ref 11.5–14.5)
ERYTHROCYTE [SEDIMENTATION RATE] IN BLOOD BY WESTERGREN METHOD: 10 MM/HR (ref 0–10)
EST. GFR  (NO RACE VARIABLE): 50 ML/MIN/1.73 M^2
GLUCOSE SERPL-MCNC: 199 MG/DL (ref 70–110)
HCT VFR BLD AUTO: 45.2 % (ref 40–54)
HGB BLD-MCNC: 15.1 G/DL (ref 14–18)
IMM GRANULOCYTES # BLD AUTO: 0.06 K/UL (ref 0–0.04)
IMM GRANULOCYTES NFR BLD AUTO: 0.5 % (ref 0–0.5)
LYMPHOCYTES # BLD AUTO: 2.4 K/UL (ref 1–4.8)
LYMPHOCYTES NFR BLD: 21.2 % (ref 18–48)
MCH RBC QN AUTO: 30.9 PG (ref 27–31)
MCHC RBC AUTO-ENTMCNC: 33.4 G/DL (ref 32–36)
MCV RBC AUTO: 93 FL (ref 82–98)
MONOCYTES # BLD AUTO: 0.6 K/UL (ref 0.3–1)
MONOCYTES NFR BLD: 5.6 % (ref 4–15)
NEUTROPHILS # BLD AUTO: 7.9 K/UL (ref 1.8–7.7)
NEUTROPHILS NFR BLD: 71.1 % (ref 38–73)
NRBC BLD-RTO: 0 /100 WBC
PLATELET # BLD AUTO: 165 K/UL (ref 150–450)
PMV BLD AUTO: 11.5 FL (ref 9.2–12.9)
POTASSIUM SERPL-SCNC: 4.8 MMOL/L (ref 3.5–5.1)
PROT SERPL-MCNC: 6.7 G/DL (ref 6–8.4)
RBC # BLD AUTO: 4.88 M/UL (ref 4.6–6.2)
SODIUM SERPL-SCNC: 134 MMOL/L (ref 136–145)
WBC # BLD AUTO: 11.11 K/UL (ref 3.9–12.7)

## 2024-08-28 PROCEDURE — 85025 COMPLETE CBC W/AUTO DIFF WBC: CPT | Performed by: EMERGENCY MEDICINE

## 2024-08-28 PROCEDURE — 85651 RBC SED RATE NONAUTOMATED: CPT | Performed by: EMERGENCY MEDICINE

## 2024-08-28 PROCEDURE — 36415 COLL VENOUS BLD VENIPUNCTURE: CPT | Performed by: EMERGENCY MEDICINE

## 2024-08-28 PROCEDURE — 99284 EMERGENCY DEPT VISIT MOD MDM: CPT | Mod: 25

## 2024-08-28 PROCEDURE — 86140 C-REACTIVE PROTEIN: CPT | Performed by: EMERGENCY MEDICINE

## 2024-08-28 PROCEDURE — 25000003 PHARM REV CODE 250: Performed by: EMERGENCY MEDICINE

## 2024-08-28 PROCEDURE — 80053 COMPREHEN METABOLIC PANEL: CPT | Performed by: EMERGENCY MEDICINE

## 2024-08-28 RX ORDER — ACETAMINOPHEN 500 MG
1000 TABLET ORAL
Status: COMPLETED | OUTPATIENT
Start: 2024-08-28 | End: 2024-08-28

## 2024-08-28 RX ORDER — GABAPENTIN 300 MG/1
300 CAPSULE ORAL ONCE
Status: COMPLETED | OUTPATIENT
Start: 2024-08-28 | End: 2024-08-28

## 2024-08-28 RX ADMIN — ACETAMINOPHEN 1000 MG: 500 TABLET ORAL at 08:08

## 2024-08-28 RX ADMIN — GABAPENTIN 300 MG: 300 CAPSULE ORAL at 09:08

## 2024-08-28 NOTE — LETTER
Patient: Tolu Lyles  YOB: 1970  Date: 8/28/2024 Time: 9:48 AM  Location: [facility]    Leaving the Hospital Against Medical Advice    Chart #:[id number]    This will certify that I, the undersigned,    ______________________________________________________________________    A patient in the above named medical center, having requested discharge and removal from the medical center against the advice of my attending physician(s), hereby release [facility], its physicians, officers and employees, severally and individually, from any and all liability of any nature whatsoever for any injury or harm or complication of any kind that may result directly or indirectly, by reason of my terminating my stay as a patient at [facility] and my departure from [facility], and hereby waive any and all rights of action I may now have or later acquire as a result of my voluntary departure from [facility] and the termination of my stay as a patient therein.    This release is made with the full knowledge of the danger that may result from the action which I am taking.      Date:_______________________                         ___________________________                                                                                    Patient/Legal Representative    Witness:        ____________________________                          ___________________________  Nurse                                                                        Physician

## 2024-08-28 NOTE — ED NOTES
Tried to call pt to let him know the xray says he has osteomyletis. Wanted to give the pt the option to come back after he left AMA.

## 2024-08-28 NOTE — ED PROVIDER NOTES
Encounter Date: 8/28/2024       History     Chief Complaint   Patient presents with    Wound Check     Reports wound to 2nd toe on right foot for the past several months     54-year-old male with past medical history of coronary artery disease, diabetes, lung cancer, COPD, chronic back pain, presents emergency department with right 2nd toe pain.  Patient says that his toe has been bothering him for a long time.  He says it is swollen.  He says it has a smell to it.  He says he prior needs to see a podiatrist.  He denies any injury to it or trauma.  He says he had fever and chills a couple days ago maybe 4 days ago when he thought maybe he was getting septic so he decided to come to the ER.  Patient also states he is almost out of all of his medication and might need refills on all of his medicines.      Review of patient's allergies indicates:   Allergen Reactions    Codeine Swelling, Anaphylaxis, Hives and Other (See Comments)     Throat swell, rash     Past Medical History:   Diagnosis Date    Chronic back pain     COPD (chronic obstructive pulmonary disease)     Coronary artery disease     Diabetes mellitus     Lung nodule     Sleep apnea     Unspecified injury of head, initial encounter 1975     Past Surgical History:   Procedure Laterality Date    BRONCHOSCOPY WITH FLUOROSCOPY N/A 10/25/2023    Procedure: BRONCHOSCOPY, WITH FLUOROSCOPY;  Surgeon: Alice Holman MD;  Location: [facility];  Service: Pulmonary;  Laterality: N/A;    CARDIAC SURGERY  2001    CARIDAC STENTS 7 ;  TEXARCANA     Family History   Problem Relation Name Age of Onset    Diabetes Mother      Hypertension Mother      Heart disease Mother      Stroke Mother      Diabetes Father      Hypertension Father      Heart disease Father      Stroke Father      Cancer Maternal Uncle       Social History     Tobacco Use    Smoking status: Every Day     Current packs/day: 0.50     Average packs/day: 0.5 packs/day for 51.7 years (25.8 ttl pk-yrs)      Types: Cigarettes     Start date: 1/1/1973    Smokeless tobacco: Never    Tobacco comments:     Smokes 10 cigarettes daily   Substance Use Topics    Alcohol use: Not Currently    Drug use: Never     Review of Systems   Constitutional:  Positive for chills and fever (Several days ago none today).   HENT:  Negative for sore throat.    Respiratory:  Negative for shortness of breath.    Cardiovascular:  Negative for chest pain.   Gastrointestinal:  Negative for abdominal pain, nausea and vomiting.   Genitourinary:  Negative for dysuria.   Musculoskeletal:  Positive for arthralgias (right 2nd toe). Negative for back pain.   Skin:  Negative for rash.   Neurological:  Negative for weakness and headaches.   Hematological:  Does not bruise/bleed easily.   All other systems reviewed and are negative.      Physical Exam     Initial Vitals [08/28/24 0705]   BP Pulse Resp Temp SpO2   123/74 78 16 98.6 °F (37 °C) 98 %      MAP       --         Physical Exam    Nursing note and vitals reviewed.  Constitutional: He appears well-developed and well-nourished. He is not diaphoretic. He is Obese . No distress.   HENT:   Head: Normocephalic and atraumatic.   Mouth/Throat: Oropharynx is clear and moist. No oropharyngeal exudate.   Eyes: Conjunctivae and EOM are normal. Pupils are equal, round, and reactive to light.   Neck: No tracheal deviation present.   Cardiovascular:  Normal rate, regular rhythm, normal heart sounds and intact distal pulses.           No murmur heard.  Pulmonary/Chest: Breath sounds normal. No stridor. No respiratory distress. He has no wheezes. He has no rhonchi. He has no rales.   Abdominal: Abdomen is soft. Bowel sounds are normal. He exhibits no distension. There is no abdominal tenderness. There is no rebound and no guarding.   Musculoskeletal:         General: No tenderness or edema. Normal range of motion.      Comments: Right 2nd toe, mild swelling noted, minimal erythema, decreased sensation to light touch,  2-3 second capillary refill.  Please see below picture     Neurological: He is alert and oriented to person, place, and time. He has normal strength. No cranial nerve deficit or sensory deficit.   Skin: Skin is warm and dry. Capillary refill takes less than 2 seconds. No rash noted. No erythema. No pallor.   Psychiatric: He has a normal mood and affect. His behavior is normal. Thought content normal.         ED Course   Procedures  Labs Reviewed   CBC W/ AUTO DIFFERENTIAL - Abnormal       Result Value    WBC 11.11      RBC 4.88      Hemoglobin 15.1      Hematocrit 45.2      MCV 93      MCH 30.9      MCHC 33.4      RDW 14.5      Platelets 165      MPV 11.5      Immature Granulocytes 0.5      Gran # (ANC) 7.9 (*)     Immature Grans (Abs) 0.06 (*)     Lymph # 2.4      Mono # 0.6      Eos # 0.1      Baso # 0.07      nRBC 0      Gran % 71.1      Lymph % 21.2      Mono % 5.6      Eosinophil % 1.0      Basophil % 0.6      Differential Method Automated     COMPREHENSIVE METABOLIC PANEL - Abnormal    Sodium 134 (*)     Potassium 4.8      Chloride 104      CO2 19 (*)     Glucose 199 (*)     BUN 35 (*)     Creatinine 1.6 (*)     Calcium 9.5      Total Protein 6.7      Albumin 3.7      Total Bilirubin 0.6      Alkaline Phosphatase 64      AST 11      ALT 9 (*)     eGFR 50 (*)     Anion Gap 11     C-REACTIVE PROTEIN - Abnormal    CRP 17.9 (*)    SEDIMENTATION RATE    Sed Rate 10            Imaging Results              X-Ray Toe 2 or More Views Right (Final result)  Result time 08/28/24 09:45:17      Final result by Franklin Grimaldo MD (08/28/24 09:45:17)                   Impression:      Soft tissue injury along the tip of the 2nd toe, with findings likely in keeping with subjacent osteomyelitis.      Electronically signed by: Franklin Grimaldo  Date:    08/28/2024  Time:    09:45               Narrative:    EXAMINATION:  XR TOE 2 OR MORE VIEWS RIGHT    CLINICAL HISTORY:  toe pain;    TECHNIQUE:  Three views of the right toes  "were performed    COMPARISON:  None    FINDINGS:  There is a curvilinear erosion along the tuft of the 2nd toe distal phalanx.  There is overlying skin ulceration or defect.  Query diffuse 2nd toe soft tissue swelling.  No traumatic malalignment or acute fracture.  Preserved joint spaces.                                       Medications   gabapentin capsule 300 mg (300 mg Oral Given 8/28/24 0900)   acetaminophen tablet 1,000 mg (1,000 mg Oral Given 8/28/24 0859)     Medical Decision Making  Differential includes but not limited to toe pain, abscess, osteomyelitis, neuropathy, cellulitis,    Emergent evaluation of a 54-year-old male presents emergency department with right 2nd toe pain.  Patient did not have any obvious evidence of cellulitis or any drainage.  Concern was for osteomyelitis likely chronic.  X-ray had not resulted at the time the patient left AMA.  Nursing staff brought AMA forms of the patient and he refused to sign it.  She went over AMA instructions with him and he left the hospital before I could have an AMA discussion with him.  Patient left AMA with he knew the concern of possible osteomyelitis which I was waiting for the x-ray 4.  Patient requested opioid pain medication for his toe pain multiple times.  Patient has a capacity to make his own decisions, GCS of 15, he was rude to staff yelling obscenities told the nurse to "lick my ass" and left emergency department against medical advice.     A dictation software program was used for this note.  Please expect some simple typographical  errors in this note.         Amount and/or Complexity of Data Reviewed  External Data Reviewed: labs and notes.  Labs: ordered. Decision-making details documented in ED Course.  Radiology: ordered and independent interpretation performed. Decision-making details documented in ED Course.    Risk  OTC drugs.  Prescription drug management.  Decision regarding hospitalization.               ED Course as of 08/28/24 " 0956   Wed Aug 28, 2024   0930 Patient repeatedly requesting opioids for his toe pain.  I am trying to rule out osteomyelitis in his toe.  X-rays still pending. [JR]      ED Course User Index  [JR] Uriah Salgado DO                           Clinical Impression:  Final diagnoses:  [M86.171] Acute osteomyelitis of toe of right foot (Primary)          ED Disposition Condition    AMA Stable                Uriah Salgado DO  08/28/24 0956

## 2024-09-03 ENCOUNTER — HOSPITAL ENCOUNTER (INPATIENT)
Facility: HOSPITAL | Age: 54
LOS: 3 days | Discharge: HOME | DRG: 617 | End: 2024-09-06
Attending: EMERGENCY MEDICINE | Admitting: HOSPITALIST
Payer: MEDICARE

## 2024-09-03 DIAGNOSIS — R91.1 PULMONARY NODULE 1 CM OR GREATER IN DIAMETER: ICD-10-CM

## 2024-09-03 DIAGNOSIS — M86.9 OSTEOMYELITIS OF TOE OF RIGHT FOOT: ICD-10-CM

## 2024-09-03 DIAGNOSIS — M79.676 TOE PAIN: ICD-10-CM

## 2024-09-03 DIAGNOSIS — G89.29 CHRONIC BILATERAL LOW BACK PAIN WITH BILATERAL SCIATICA: ICD-10-CM

## 2024-09-03 DIAGNOSIS — L03.115 CELLULITIS OF RIGHT FOOT: ICD-10-CM

## 2024-09-03 DIAGNOSIS — M86.9 OSTEOMYELITIS OF TOE: Primary | ICD-10-CM

## 2024-09-03 DIAGNOSIS — Z89.421 STATUS POST AMPUTATION OF LESSER TOE OF RIGHT FOOT: ICD-10-CM

## 2024-09-03 DIAGNOSIS — R07.9 CHEST PAIN: ICD-10-CM

## 2024-09-03 DIAGNOSIS — M54.42 CHRONIC BILATERAL LOW BACK PAIN WITH BILATERAL SCIATICA: ICD-10-CM

## 2024-09-03 DIAGNOSIS — M54.41 CHRONIC BILATERAL LOW BACK PAIN WITH BILATERAL SCIATICA: ICD-10-CM

## 2024-09-03 DIAGNOSIS — N43.3 HYDROCELE, UNSPECIFIED HYDROCELE TYPE: ICD-10-CM

## 2024-09-03 PROBLEM — N39.0 UTI (URINARY TRACT INFECTION): Status: ACTIVE | Noted: 2024-09-03

## 2024-09-03 PROBLEM — L03.031 CELLULITIS OF SECOND TOE OF RIGHT FOOT: Status: ACTIVE | Noted: 2024-09-03

## 2024-09-03 LAB
ALBUMIN SERPL BCP-MCNC: 4.2 G/DL (ref 3.5–5.2)
ALP SERPL-CCNC: 57 U/L (ref 55–135)
ALT SERPL W/O P-5'-P-CCNC: 7 U/L (ref 10–44)
ANION GAP SERPL CALC-SCNC: 6 MMOL/L (ref 8–16)
AST SERPL-CCNC: 9 U/L (ref 10–40)
BASOPHILS # BLD AUTO: 0.07 K/UL (ref 0–0.2)
BASOPHILS NFR BLD: 0.6 % (ref 0–1.9)
BILIRUB SERPL-MCNC: 0.5 MG/DL (ref 0.1–1)
BILIRUB UR QL STRIP: NEGATIVE
BUN SERPL-MCNC: 37 MG/DL (ref 6–20)
CALCIUM SERPL-MCNC: 9.2 MG/DL (ref 8.7–10.5)
CHLORIDE SERPL-SCNC: 104 MMOL/L (ref 95–110)
CLARITY UR: CLEAR
CO2 SERPL-SCNC: 25 MMOL/L (ref 23–29)
COLOR UR: YELLOW
CREAT SERPL-MCNC: 1.4 MG/DL (ref 0.5–1.4)
CRP SERPL-MCNC: 2.1 MG/DL
DIFFERENTIAL METHOD BLD: ABNORMAL
EOSINOPHIL # BLD AUTO: 0.1 K/UL (ref 0–0.5)
EOSINOPHIL NFR BLD: 0.8 % (ref 0–8)
ERYTHROCYTE [DISTWIDTH] IN BLOOD BY AUTOMATED COUNT: 14.8 % (ref 11.5–14.5)
ERYTHROCYTE [SEDIMENTATION RATE] IN BLOOD BY WESTERGREN METHOD: 9 MM/HR (ref 0–10)
EST. GFR  (NO RACE VARIABLE): 59 ML/MIN/1.73 M^2
GLUCOSE SERPL-MCNC: 140 MG/DL (ref 70–110)
GLUCOSE SERPL-MCNC: 200 MG/DL (ref 70–110)
GLUCOSE SERPL-MCNC: 210 MG/DL (ref 70–110)
GLUCOSE SERPL-MCNC: 221 MG/DL (ref 70–110)
GLUCOSE UR QL STRIP: ABNORMAL
HCT VFR BLD AUTO: 45.6 % (ref 40–54)
HGB BLD-MCNC: 15 G/DL (ref 14–18)
HGB UR QL STRIP: NEGATIVE
IMM GRANULOCYTES # BLD AUTO: 0.07 K/UL (ref 0–0.04)
IMM GRANULOCYTES NFR BLD AUTO: 0.6 % (ref 0–0.5)
KETONES UR QL STRIP: NEGATIVE
LACTATE SERPL-SCNC: 1.4 MMOL/L (ref 0.5–1.9)
LEUKOCYTE ESTERASE UR QL STRIP: ABNORMAL
LYMPHOCYTES # BLD AUTO: 2 K/UL (ref 1–4.8)
LYMPHOCYTES NFR BLD: 17.3 % (ref 18–48)
MCH RBC QN AUTO: 30.7 PG (ref 27–31)
MCHC RBC AUTO-ENTMCNC: 32.9 G/DL (ref 32–36)
MCV RBC AUTO: 93 FL (ref 82–98)
MICROSCOPIC COMMENT: ABNORMAL
MONOCYTES # BLD AUTO: 0.6 K/UL (ref 0.3–1)
MONOCYTES NFR BLD: 5.3 % (ref 4–15)
NEUTROPHILS # BLD AUTO: 8.5 K/UL (ref 1.8–7.7)
NEUTROPHILS NFR BLD: 75.4 % (ref 38–73)
NITRITE UR QL STRIP: NEGATIVE
NRBC BLD-RTO: 0 /100 WBC
PH UR STRIP: 6 [PH] (ref 5–8)
PLATELET # BLD AUTO: 208 K/UL (ref 150–450)
PMV BLD AUTO: 11.4 FL (ref 9.2–12.9)
POTASSIUM SERPL-SCNC: 4.5 MMOL/L (ref 3.5–5.1)
PROT SERPL-MCNC: 7.1 G/DL (ref 6–8.4)
PROT UR QL STRIP: NEGATIVE
RBC # BLD AUTO: 4.89 M/UL (ref 4.6–6.2)
RBC #/AREA URNS HPF: 6 /HPF (ref 0–4)
SODIUM SERPL-SCNC: 135 MMOL/L (ref 136–145)
SP GR UR STRIP: 1.01 (ref 1–1.03)
SQUAMOUS #/AREA URNS HPF: 0 /HPF
URN SPEC COLLECT METH UR: ABNORMAL
UROBILINOGEN UR STRIP-ACNC: NEGATIVE EU/DL
WBC # BLD AUTO: 11.25 K/UL (ref 3.9–12.7)
WBC #/AREA URNS HPF: 92 /HPF (ref 0–5)
YEAST URNS QL MICRO: ABNORMAL

## 2024-09-03 PROCEDURE — 12000002 HC ACUTE/MED SURGE SEMI-PRIVATE ROOM

## 2024-09-03 PROCEDURE — 83605 ASSAY OF LACTIC ACID: CPT | Performed by: PHYSICIAN ASSISTANT

## 2024-09-03 PROCEDURE — 87040 BLOOD CULTURE FOR BACTERIA: CPT | Performed by: PHYSICIAN ASSISTANT

## 2024-09-03 PROCEDURE — 63600175 PHARM REV CODE 636 W HCPCS: Performed by: EMERGENCY MEDICINE

## 2024-09-03 PROCEDURE — 86140 C-REACTIVE PROTEIN: CPT | Performed by: EMERGENCY MEDICINE

## 2024-09-03 PROCEDURE — 25000003 PHARM REV CODE 250: Performed by: EMERGENCY MEDICINE

## 2024-09-03 PROCEDURE — 99900031 HC PATIENT EDUCATION (STAT)

## 2024-09-03 PROCEDURE — 63600175 PHARM REV CODE 636 W HCPCS: Performed by: STUDENT IN AN ORGANIZED HEALTH CARE EDUCATION/TRAINING PROGRAM

## 2024-09-03 PROCEDURE — 96366 THER/PROPH/DIAG IV INF ADDON: CPT

## 2024-09-03 PROCEDURE — 94799 UNLISTED PULMONARY SVC/PX: CPT

## 2024-09-03 PROCEDURE — 85651 RBC SED RATE NONAUTOMATED: CPT | Performed by: EMERGENCY MEDICINE

## 2024-09-03 PROCEDURE — 25000003 PHARM REV CODE 250: Performed by: NURSE PRACTITIONER

## 2024-09-03 PROCEDURE — 81001 URINALYSIS AUTO W/SCOPE: CPT | Performed by: EMERGENCY MEDICINE

## 2024-09-03 PROCEDURE — 82962 GLUCOSE BLOOD TEST: CPT

## 2024-09-03 PROCEDURE — 99406 BEHAV CHNG SMOKING 3-10 MIN: CPT

## 2024-09-03 PROCEDURE — 85025 COMPLETE CBC W/AUTO DIFF WBC: CPT | Performed by: PHYSICIAN ASSISTANT

## 2024-09-03 PROCEDURE — 96365 THER/PROPH/DIAG IV INF INIT: CPT

## 2024-09-03 PROCEDURE — 94761 N-INVAS EAR/PLS OXIMETRY MLT: CPT

## 2024-09-03 PROCEDURE — 87086 URINE CULTURE/COLONY COUNT: CPT | Performed by: EMERGENCY MEDICINE

## 2024-09-03 PROCEDURE — 96375 TX/PRO/DX INJ NEW DRUG ADDON: CPT

## 2024-09-03 PROCEDURE — 99900035 HC TECH TIME PER 15 MIN (STAT)

## 2024-09-03 PROCEDURE — 25000003 PHARM REV CODE 250: Performed by: STUDENT IN AN ORGANIZED HEALTH CARE EDUCATION/TRAINING PROGRAM

## 2024-09-03 PROCEDURE — 80053 COMPREHEN METABOLIC PANEL: CPT | Performed by: PHYSICIAN ASSISTANT

## 2024-09-03 PROCEDURE — 36415 COLL VENOUS BLD VENIPUNCTURE: CPT | Performed by: PHYSICIAN ASSISTANT

## 2024-09-03 PROCEDURE — 63600175 PHARM REV CODE 636 W HCPCS: Performed by: NURSE PRACTITIONER

## 2024-09-03 PROCEDURE — 99285 EMERGENCY DEPT VISIT HI MDM: CPT | Mod: 25

## 2024-09-03 RX ORDER — ACETAMINOPHEN 325 MG/1
650 TABLET ORAL EVERY 4 HOURS PRN
Status: DISCONTINUED | OUTPATIENT
Start: 2024-09-03 | End: 2024-09-06 | Stop reason: HOSPADM

## 2024-09-03 RX ORDER — INSULIN ASPART 100 [IU]/ML
0-5 INJECTION, SOLUTION INTRAVENOUS; SUBCUTANEOUS
Status: DISCONTINUED | OUTPATIENT
Start: 2024-09-03 | End: 2024-09-06 | Stop reason: HOSPADM

## 2024-09-03 RX ORDER — GLUCAGON 1 MG
1 KIT INJECTION
Status: DISCONTINUED | OUTPATIENT
Start: 2024-09-03 | End: 2024-09-06 | Stop reason: HOSPADM

## 2024-09-03 RX ORDER — LISINOPRIL 10 MG/1
10 TABLET ORAL DAILY
COMMUNITY
Start: 2024-06-28

## 2024-09-03 RX ORDER — SODIUM,POTASSIUM PHOSPHATES 280-250MG
2 POWDER IN PACKET (EA) ORAL
Status: DISCONTINUED | OUTPATIENT
Start: 2024-09-03 | End: 2024-09-06 | Stop reason: HOSPADM

## 2024-09-03 RX ORDER — AMOXICILLIN 250 MG
1 CAPSULE ORAL 2 TIMES DAILY PRN
Status: DISCONTINUED | OUTPATIENT
Start: 2024-09-03 | End: 2024-09-06 | Stop reason: HOSPADM

## 2024-09-03 RX ORDER — ONDANSETRON HYDROCHLORIDE 2 MG/ML
4 INJECTION, SOLUTION INTRAVENOUS EVERY 6 HOURS PRN
Status: DISCONTINUED | OUTPATIENT
Start: 2024-09-03 | End: 2024-09-06 | Stop reason: HOSPADM

## 2024-09-03 RX ORDER — MORPHINE SULFATE 4 MG/ML
4 INJECTION, SOLUTION INTRAMUSCULAR; INTRAVENOUS
Status: COMPLETED | OUTPATIENT
Start: 2024-09-03 | End: 2024-09-03

## 2024-09-03 RX ORDER — MORPHINE SULFATE 2 MG/ML
1 INJECTION, SOLUTION INTRAMUSCULAR; INTRAVENOUS EVERY 8 HOURS PRN
Status: DISCONTINUED | OUTPATIENT
Start: 2024-09-03 | End: 2024-09-04

## 2024-09-03 RX ORDER — LANOLIN ALCOHOL/MO/W.PET/CERES
800 CREAM (GRAM) TOPICAL
Status: DISCONTINUED | OUTPATIENT
Start: 2024-09-03 | End: 2024-09-06 | Stop reason: HOSPADM

## 2024-09-03 RX ORDER — SODIUM CHLORIDE 0.9 % (FLUSH) 0.9 %
2 SYRINGE (ML) INJECTION EVERY 8 HOURS PRN
Status: DISCONTINUED | OUTPATIENT
Start: 2024-09-03 | End: 2024-09-06 | Stop reason: HOSPADM

## 2024-09-03 RX ORDER — OXYCODONE AND ACETAMINOPHEN 7.5; 325 MG/1; MG/1
1 TABLET ORAL EVERY 6 HOURS PRN
Status: DISCONTINUED | OUTPATIENT
Start: 2024-09-03 | End: 2024-09-04

## 2024-09-03 RX ORDER — IBUPROFEN 200 MG
16 TABLET ORAL
Status: DISCONTINUED | OUTPATIENT
Start: 2024-09-03 | End: 2024-09-06 | Stop reason: HOSPADM

## 2024-09-03 RX ORDER — TALC
9 POWDER (GRAM) TOPICAL NIGHTLY PRN
Status: DISCONTINUED | OUTPATIENT
Start: 2024-09-03 | End: 2024-09-06 | Stop reason: HOSPADM

## 2024-09-03 RX ORDER — ALPRAZOLAM 0.25 MG/1
0.25 TABLET ORAL ONCE
Status: COMPLETED | OUTPATIENT
Start: 2024-09-03 | End: 2024-09-03

## 2024-09-03 RX ORDER — LISINOPRIL 10 MG/1
10 TABLET ORAL DAILY
Status: DISCONTINUED | OUTPATIENT
Start: 2024-09-04 | End: 2024-09-03

## 2024-09-03 RX ORDER — ENOXAPARIN SODIUM 100 MG/ML
40 INJECTION SUBCUTANEOUS EVERY 24 HOURS
Status: DISCONTINUED | OUTPATIENT
Start: 2024-09-03 | End: 2024-09-04

## 2024-09-03 RX ORDER — TRAMADOL HYDROCHLORIDE 50 MG/1
50 TABLET ORAL
Status: COMPLETED | OUTPATIENT
Start: 2024-09-03 | End: 2024-09-03

## 2024-09-03 RX ORDER — NALOXONE HCL 0.4 MG/ML
0.02 VIAL (ML) INJECTION
Status: DISCONTINUED | OUTPATIENT
Start: 2024-09-03 | End: 2024-09-06 | Stop reason: HOSPADM

## 2024-09-03 RX ORDER — SODIUM CHLORIDE 0.9 % (FLUSH) 0.9 %
10 SYRINGE (ML) INJECTION EVERY 12 HOURS PRN
Status: DISCONTINUED | OUTPATIENT
Start: 2024-09-03 | End: 2024-09-06 | Stop reason: HOSPADM

## 2024-09-03 RX ORDER — IPRATROPIUM BROMIDE AND ALBUTEROL SULFATE 2.5; .5 MG/3ML; MG/3ML
3 SOLUTION RESPIRATORY (INHALATION) EVERY 6 HOURS PRN
Status: DISCONTINUED | OUTPATIENT
Start: 2024-09-03 | End: 2024-09-06 | Stop reason: HOSPADM

## 2024-09-03 RX ORDER — ONDANSETRON HYDROCHLORIDE 2 MG/ML
4 INJECTION, SOLUTION INTRAVENOUS
Status: COMPLETED | OUTPATIENT
Start: 2024-09-03 | End: 2024-09-03

## 2024-09-03 RX ORDER — FUROSEMIDE 20 MG/1
40 TABLET ORAL DAILY
Status: DISCONTINUED | OUTPATIENT
Start: 2024-09-04 | End: 2024-09-03

## 2024-09-03 RX ORDER — IBUPROFEN 200 MG
24 TABLET ORAL
Status: DISCONTINUED | OUTPATIENT
Start: 2024-09-03 | End: 2024-09-06 | Stop reason: HOSPADM

## 2024-09-03 RX ORDER — POTASSIUM CHLORIDE 20 MEQ/1
20 TABLET, EXTENDED RELEASE ORAL DAILY
COMMUNITY

## 2024-09-03 RX ADMIN — GABAPENTIN 800 MG: 300 CAPSULE ORAL at 09:09

## 2024-09-03 RX ADMIN — Medication 9 MG: at 09:09

## 2024-09-03 RX ADMIN — SODIUM CHLORIDE, POTASSIUM CHLORIDE, SODIUM LACTATE AND CALCIUM CHLORIDE 1000 ML: 600; 310; 30; 20 INJECTION, SOLUTION INTRAVENOUS at 11:09

## 2024-09-03 RX ADMIN — MORPHINE SULFATE 4 MG: 4 INJECTION, SOLUTION INTRAMUSCULAR; INTRAVENOUS at 02:09

## 2024-09-03 RX ADMIN — ENOXAPARIN SODIUM 40 MG: 40 INJECTION SUBCUTANEOUS at 05:09

## 2024-09-03 RX ADMIN — SODIUM CHLORIDE 500 ML: 0.9 INJECTION, SOLUTION INTRAVENOUS at 09:09

## 2024-09-03 RX ADMIN — ONDANSETRON 4 MG: 2 INJECTION INTRAMUSCULAR; INTRAVENOUS at 02:09

## 2024-09-03 RX ADMIN — VANCOMYCIN HYDROCHLORIDE 2000 MG: 500 INJECTION, POWDER, LYOPHILIZED, FOR SOLUTION INTRAVENOUS at 03:09

## 2024-09-03 RX ADMIN — PIPERACILLIN SODIUM AND TAZOBACTAM SODIUM 3.38 G: 3; .375 INJECTION, POWDER, LYOPHILIZED, FOR SOLUTION INTRAVENOUS at 07:09

## 2024-09-03 RX ADMIN — ALPRAZOLAM 0.25 MG: 0.25 TABLET ORAL at 05:09

## 2024-09-03 RX ADMIN — TRAMADOL HYDROCHLORIDE 50 MG: 50 TABLET, COATED ORAL at 12:09

## 2024-09-03 RX ADMIN — OXYCODONE HYDROCHLORIDE AND ACETAMINOPHEN 1 TABLET: 7.5; 325 TABLET ORAL at 06:09

## 2024-09-03 NOTE — H&P
[facility] - Emergency Dept  Hospital Medicine  History & Physical    Patient Name: Tolu Lyles Sr.  MRN: [id number]  Patient Class: IP- Inpatient  Admission Date: 9/3/2024  Attending Physician: Meg Matthews MD   Primary Care Provider: Liana Primary Doctor         Patient information was obtained from patient, past medical records, and ER records.     Subjective:     Principal Problem:Cellulitis of second toe of right foot    Chief Complaint:   Chief Complaint   Patient presents with    Toe Pain     RT 2ND TOE, BLACK AND PART OF IT FELL OFF        HPI: 54 year old male with a past medical history of chronic back pain, COPD, lung cancer, coronary artery disease, diabetes mellitus, sleep apnea who presents to the emergency room with reports of swelling and redness to his foot. Reports that he had an infected 2nd with beginning stages of osteomyelitis, the black area fell off and now it is with increased redness and purulent drainage.  He also reports increased warmth and swelling to the foot.  Denies any fevers or chills or exacerbating or alleviating factors otherwise.  In the ER, WBC 11.25, sodium 135, sed rate 9, BUN 37 creatinine 1.4 glucose 210 CRP 2.10 lactic acid 1.4 UA 3+ leukocytes, 92 WBCs blood cultures urine culture sent.  Right foot x-ray  Osseous destruction of the distal 2nd phalanx is increased, Soft tissue swelling of the forefoot and 2nd digit.  IV Zosyn, IV vancomycin, IV morphine, tramadol given.  Admit to hospital medicine for  further medical management rule out osteomyelitis    Past Medical History:   Diagnosis Date    Chronic back pain     COPD (chronic obstructive pulmonary disease)     Coronary artery disease     Diabetes mellitus     Lung nodule     Sleep apnea     Unspecified injury of head, initial encounter 1975       Past Surgical History:   Procedure Laterality Date    BRONCHOSCOPY WITH FLUOROSCOPY N/A 10/25/2023    Procedure: BRONCHOSCOPY, WITH FLUOROSCOPY;  Surgeon:  Alice Holman MD;  Location: [facility];  Service: Pulmonary;  Laterality: N/A;    CARDIAC SURGERY  2001    CARIDAC STENTS 7 ;  TEXARCANA       Review of patient's allergies indicates:   Allergen Reactions    Codeine Swelling, Anaphylaxis, Hives and Other (See Comments)     Throat swell, rash    Metformin        No current facility-administered medications on file prior to encounter.     Current Outpatient Medications on File Prior to Encounter   Medication Sig    furosemide (LASIX) 40 MG tablet Take 40 mg by mouth once daily.    gabapentin (NEURONTIN) 400 MG capsule Take 2 capsules (800 mg total) by mouth 3 (three) times daily.    lisinopriL 10 MG tablet Take 10 mg by mouth once daily.    metFORMIN (GLUCOPHAGE) 1000 MG tablet Take 1,000 mg by mouth 2 (two) times daily with meals.    potassium chloride SA (K-DUR,KLOR-CON) 20 MEQ tablet Take 20 mEq by mouth once daily.    ALPRAZolam (XANAX) 0.25 MG tablet Take 1 tablet (0.25 mg total) by mouth 2 (two) times daily as needed for Anxiety. (Patient not taking: Reported on 9/3/2024)    [DISCONTINUED] albuterol (PROVENTIL) 2.5 mg /3 mL (0.083 %) nebulizer solution Take 3 mLs (2.5 mg total) by nebulization every 6 (six) hours as needed for Wheezing. Rescue    [DISCONTINUED] fluticasone-umeclidin-vilanter (TRELEGY ELLIPTA) 200-62.5-25 mcg inhaler Inhale 1 puff into the lungs once daily. (Patient not taking: Reported on 12/13/2023)    [DISCONTINUED] methocarbamoL (ROBAXIN) 750 MG Tab Take 750 mg by mouth 2 (two) times daily.    [DISCONTINUED] metoclopramide HCl (REGLAN) 10 MG tablet Take 10 mg by mouth 2 (two) times a day.    [DISCONTINUED] omeprazole (PRILOSEC) 40 MG capsule Take 40 mg by mouth once daily.     Family History       Problem Relation (Age of Onset)    Cancer Maternal Uncle    Diabetes Mother, Father    Heart disease Mother, Father    Hypertension Mother, Father    Stroke Mother, Father          Tobacco Use    Smoking status: Former     Current packs/day:  0.00     Average packs/day: 0.5 packs/day for 51.7 years (25.8 ttl pk-yrs)     Types: Cigarettes     Start date: 1/1/1973     Quit date: 9/2/2024    Smokeless tobacco: Never    Tobacco comments:     Smokes 10 cigarettes daily   Substance and Sexual Activity    Alcohol use: Not Currently    Drug use: Never    Sexual activity: Not Currently     Review of Systems   Constitutional: Negative.    HENT: Negative.     Respiratory: Negative.     Cardiovascular: Negative.    Gastrointestinal: Negative.    Genitourinary: Negative.    Musculoskeletal:         Right foot swelling, toe pain   Skin:  Positive for wound.   Neurological: Negative.    Psychiatric/Behavioral: Negative.       Objective:     Vital Signs (Most Recent):  Temp: 98.6 °F (37 °C) (09/03/24 1108)  Pulse: (!) 56 (09/03/24 1539)  Resp: 17 (09/03/24 1539)  BP: 119/72 (09/03/24 1539)  SpO2: 96 % (09/03/24 1539) Vital Signs (24h Range):  Temp:  [98.6 °F (37 °C)] 98.6 °F (37 °C)  Pulse:  [56-83] 56  Resp:  [16-18] 17  SpO2:  [95 %-96 %] 96 %  BP: (105-129)/(72-76) 119/72     Weight: 113.4 kg (250 lb)  Body mass index is 32.98 kg/m².     Physical Exam  Constitutional:       General: He is not in acute distress.     Appearance: He is obese.   HENT:      Head: Normocephalic and atraumatic.      Mouth/Throat:      Mouth: Mucous membranes are moist.   Eyes:      Extraocular Movements: Extraocular movements intact.      Pupils: Pupils are equal, round, and reactive to light.   Cardiovascular:      Rate and Rhythm: Normal rate and regular rhythm.      Pulses: Normal pulses.      Heart sounds: Murmur heard.   Pulmonary:      Breath sounds: Examination of the right-upper field reveals rales. Examination of the left-upper field reveals rales. Examination of the left-middle field reveals wheezing. Examination of the right-lower field reveals wheezing. Examination of the left-lower field reveals wheezing. Wheezing and rales present.   Abdominal:      General: Bowel sounds are  normal. There is no distension.      Palpations: Abdomen is soft.      Tenderness: There is no abdominal tenderness.   Musculoskeletal:         General: Normal range of motion.      Cervical back: Neck supple.      Right lower leg: Edema present.   Skin:     General: Skin is warm.      Capillary Refill: Capillary refill takes less than 2 seconds.      Comments: .  Foot with edema, erythema, right 2nd toe warm to touch tender to touch, erythema and edema with purulent drainage.  2+ pulses in the right foot   Neurological:      General: No focal deficit present.      Mental Status: He is alert. Mental status is at baseline.   Psychiatric:         Mood and Affect: Mood normal.          CRANIAL NERVES     CN III, IV, VI   Pupils are equal, round, and reactive to light.     Significant Labs: All pertinent labs within the past 24 hours have been reviewed.  Recent Lab Results         09/03/24  1249   09/03/24  1209   09/03/24  1201   09/03/24  1115        Albumin     4.2         ALP     57         ALT     7         Anion Gap     6         Appearance, UA Clear             AST     9         Baso #     0.07         Basophil %     0.6         Bilirubin (UA) Negative             BILIRUBIN TOTAL     0.5  Comment: For infants and newborns, interpretation of results should be based  on gestational age, weight and in agreement with clinical  observations.    Premature Infant recommended reference ranges:  Up to 24 hours.............<8.0 mg/dL  Up to 48 hours............<12.0 mg/dL  3-5 days..................<15.0 mg/dL  6-29 days.................<15.0 mg/dL           BUN     37         Calcium     9.2         Chloride     104         CO2     25         Color, UA Yellow             Creatinine     1.4         CRP   2.10  Comment: CRP-Normal Application expected values:   <1.0        mg/dL   Normal Range  1.0 - 5.0  mg/dL   Indicates mild inflammation  5.0 - 10.0 mg/dL   Indicates severe inflammation  >10.0        mg/dL   Represents  serious processes and   frequently         indicates the presence of a bacterial   infection.              Differential Method     Automated         eGFR     59.0         Eos #     0.1         Eos %     0.8         Glucose     210         Glucose, UA Trace             Gran # (ANC)     8.5         Gran %     75.4         Hematocrit     45.6         Hemoglobin     15.0         Immature Grans (Abs)     0.07  Comment: Mild elevation in immature granulocytes is non specific and   can be seen in a variety of conditions including stress response,   acute inflammation, trauma and pregnancy. Correlation with other   laboratory and clinical findings is essential.           Immature Granulocytes     0.6         Ketones, UA Negative             Lactic Acid Level     1.4  Comment: Falsely low lactic acid results can be found in samples   containing >=13.0 mg/dL total bilirubin and/or >=3.5 mg/dL   direct bilirubin.           Leukocyte Esterase, UA 3+             Lymph #     2.0         Lymph %     17.3         MCH     30.7         MCHC     32.9         MCV     93         Microscopic Comment SEE COMMENT  Comment: Other formed elements not mentioned in the report are not   present in the microscopic examination.                Mono #     0.6         Mono %     5.3         MPV     11.4         NITRITE UA Negative             nRBC     0         Blood, UA Negative             pH, UA 6.0             Platelet Count     208         POC Glucose       221       Potassium     4.5         PROTEIN TOTAL     7.1         Protein, UA Negative  Comment: Recommend a 24 hour urine protein or a urine   protein/creatinine ratio if globulin induced proteinuria is  clinically suspected.               RBC     4.89         RBC, UA 6             RDW     14.8         Sed Rate   9           Sodium     135         Spec Grav UA 1.015             Specimen UA Urine, Clean Catch             Squam Epithel, UA 0             UROBILINOGEN UA Negative              "WBC, UA 92             WBC     11.25         Yeast, UA Few                     Significant Imaging: I have reviewed all pertinent imaging results/findings within the past 24 hours.  Imaging Results              X-Ray Foot Complete Right (Final result)  Result time 09/03/24 11:49:17      Final result by Abel Boyd DO (09/03/24 11:49:17)                   Impression:      1. Osseous destruction of the distal 2nd phalanx is increased from previous exam.  2. Soft tissue swelling of the forefoot and 2nd digit.      Electronically signed by: Abel Boyd  Date:    09/03/2024  Time:    11:49               Narrative:    EXAMINATION:  XR FOOT COMPLETE 3 VIEW RIGHT    CLINICAL HISTORY:  Pain in unspecified toe(s)    FINDINGS:  Three views of the right foot with comparison radiograph 08/28/2024. There is osseous destruction of the distal 2nd phalanx, increased from previous exam.  No other definite destructive osseous lesion observed.  There is soft tissue swelling of the 2nd digit.  Soft tissue swelling of the forefoot.  No radiopaque foreign bodies.                                      Assessment/Plan:     * Cellulitis of second toe of right foot  MRI right foot ordered  IV Zosyn and vancomycin started  Consult  Wound Care, podiatry  IV hydration, pain medication  Right foot x-ray: Osseous destruction of the distal 2nd phalanx is increased from previous exam.  2. Soft tissue swelling of the forefoot and 2nd digit.  CRP elevated      UTI (urinary tract infection)  Continue IV antibiotics  Urine culture pending      Type 2 diabetes mellitus, without long-term current use of insulin  Patient's FSGs are uncontrolled due to hyperglycemia on current medication regimen.  Last A1c reviewed-   Lab Results   Component Value Date    HGBA1C 7.2 (H) 11/02/2023     Most recent fingerstick glucose reviewed- No results for input(s): "POCTGLUCOSE" in the last 24 hours.  Current correctional scale  Low  Maintain anti-hyperglycemic " dose as follows-   Antihyperglycemics (From admission, onward)      Start     Stop Route Frequency Ordered    09/03/24 1713  insulin aspart U-100 pen 0-5 Units         -- SubQ Before meals & nightly PRN 09/03/24 1618          Hold Oral hypoglycemics while patient is in the hospital.     Diabetic teaching needed  Social Service consulted patient reports he is homeless and has no diabetic supplies        VTE Risk Mitigation (From admission, onward)           Ordered     enoxaparin injection 40 mg  Daily         09/03/24 1618     IP VTE HIGH RISK PATIENT  Once         09/03/24 1618     Place sequential compression device  Until discontinued         09/03/24 1618                               Pharmacokinetic Initial Assessment: IV Vancomycin    Assessment/Plan:    Initiate intravenous vancomycin with loading dose of 2000 mg once   Desired empiric serum trough concentration is 10 to 15 mcg/mL  Draw vancomycin random level on 9/4 at 1500.  Pharmacy will continue to follow and monitor vancomycin.      Please contact pharmacy at extension [phone] with any questions regarding this assessment.     Thank you for the consult,   Roxanne Osorio       Patient brief summary:  Tolu Lyles . is a 54 y.o. male initiated on antimicrobial therapy with IV Vancomycin for treatment of suspected skin & soft tissue infection    Drug Allergies:   Review of patient's allergies indicates:   Allergen Reactions    Codeine Swelling, Anaphylaxis, Hives and Other (See Comments)     Throat swell, rash       Actual Body Weight:   113.4 kg    Renal Function:   Estimated Creatinine Clearance: 77.8 mL/min (based on SCr of 1.4 mg/dL).,     Dialysis Method (if applicable):  N/A    CBC (last 72 hours):  Recent Labs   Lab Result Units 09/03/24  1201   WBC K/uL 11.25   Hemoglobin g/dL 15.0   Hematocrit % 45.6   Platelets K/uL 208   Gran % % 75.4*   Lymph % % 17.3*   Mono % % 5.3   Eosinophil % % 0.8   Basophil % % 0.6   Differential Method  Automated  "      Metabolic Panel (last 72 hours):  Recent Labs   Lab Result Units 09/03/24  1201 09/03/24  1249   Sodium mmol/L 135*  --    Potassium mmol/L 4.5  --    Chloride mmol/L 104  --    CO2 mmol/L 25  --    Glucose mg/dL 210*  --    Glucose, UA   --  Trace*   BUN mg/dL 37*  --    Creatinine mg/dL 1.4  --    Albumin g/dL 4.2  --    Total Bilirubin mg/dL 0.5  --    Alkaline Phosphatase U/L 57  --    AST U/L 9*  --    ALT U/L 7*  --        Drug levels (last 3 results):  No results for input(s): "VANCOMYCINRA", "VANCORANDOM", "VANCOMYCINPE", "VANCOPEAK", "VANCOMYCINTR", "VANCOTROUGH" in the last 72 hours.    Microbiologic Results:  Microbiology Results (last 7 days)       Procedure Component Value Units Date/Time    Urine culture [[id number]] Collected: 09/03/24 1249    Order Status: No result Specimen: Urine Updated: 09/03/24 1459    Blood culture #2 **CANNOT BE ORDERED STAT** [[id number]] Collected: 09/03/24 1159    Order Status: Sent Specimen: Blood from Peripheral, Antecubital, Right Updated: 09/03/24 1208    Blood culture #1 **CANNOT BE ORDERED STAT** [[id number]] Collected: 09/03/24 1159    Order Status: Sent Specimen: Blood from Peripheral, Hand, Left Updated: 09/03/24 1208              Jessica Terrazas NP  Department of Hospital Medicine  [facility] - Emergency Dept          "

## 2024-09-03 NOTE — FIRST PROVIDER EVALUATION
Emergency Department TeleTriage Encounter Note      CHIEF COMPLAINT    Chief Complaint   Patient presents with    Toe Pain     RT 2ND TOE, BLACK AND PART OF IT FELL OFF       VITAL SIGNS   Initial Vitals [09/03/24 1108]   BP Pulse Resp Temp SpO2   129/73 83 18 98.6 °F (37 °C) 96 %      MAP       --            ALLERGIES    Review of patient's allergies indicates:   Allergen Reactions    Codeine Swelling, Anaphylaxis, Hives and Other (See Comments)     Throat swell, rash       PROVIDER TRIAGE NOTE  This is a teletriage evaluation of a 54 y.o. male presenting to the ED complaining of toe pain. Patient reports second toe pain. He states it turned black and the black part fell off with bone exposed.    Patient is alert and oriented. He speaks in complete sentences. He is sitting upright in the chair in no distress. Toe not visualized.    The on-site providers were messaged about this patient.    Initial orders will be placed and care will be transferred to an alternate provider when patient is roomed for a full evaluation. Any additional orders and the final disposition will be determined by that provider.         ORDERS  Labs Reviewed   POCT GLUCOSE - Abnormal       Result Value    POC Glucose 221 (*)    CULTURE, BLOOD   CULTURE, BLOOD   CBC W/ AUTO DIFFERENTIAL   COMPREHENSIVE METABOLIC PANEL   LACTIC ACID, PLASMA       ED Orders (720h ago, onward)      Start Ordered     Status Ordering Provider    09/03/24 1120 09/03/24 1119  CBC auto differential  STAT         Ordered ROMEO MACEDO    09/03/24 1120 09/03/24 1119  Comprehensive metabolic panel  STAT         Ordered ROMEO MACEDO    09/03/24 1120 09/03/24 1119  Insert Saline lock IV  Once         Ordered ROMEO MACEDO    09/03/24 1120 09/03/24 1119  X-Ray Foot Complete Right  1 time imaging         Ordered ROMEO MACEDO    09/03/24 1120 09/03/24 1119  Lactic acid, plasma  STAT         Ordered ROMEO MACEDO    09/03/24 1120 09/03/24 1119  Blood culture #1  **CANNOT BE ORDERED STAT**  Once         Pending Collection ROMEO MACEDO.    09/03/24 1120 09/03/24 1119  Blood culture #2 **CANNOT BE ORDERED STAT**  Once         Pending Collection ROMEO MACEDO.    09/03/24 1115 09/03/24 1115  POCT glucose  Once         Final result EMERGENCY, DEPT PHYSICIAN              Virtual Visit Note: The provider triage portion of this emergency department evaluation and documentation was performed via [employer], a HIPAA-compliant telemedicine application, in concert with a tele-presenter in the room. A face to face patient evaluation with one of my colleagues will occur once the patient is placed in an emergency department room.      DISCLAIMER: This note was prepared with [email or web address] voice recognition transcription software. Garbled syntax, mangled pronouns, and other bizarre constructions may be attributed to that software system.

## 2024-09-03 NOTE — HPI
54 year old male with a past medical history of chronic back pain, COPD, lung cancer, coronary artery disease, diabetes mellitus, sleep apnea who presents to the emergency room with reports of swelling and redness to his foot. Reports that he had an infected 2nd with beginning stages of osteomyelitis, the black area fell off and now it is with increased redness and purulent drainage.  He also reports increased warmth and swelling to the foot.  Denies any fevers or chills or exacerbating or alleviating factors otherwise.  In the ER, WBC 11.25, sodium 135, sed rate 9, BUN 37 creatinine 1.4 glucose 210 CRP 2.10 lactic acid 1.4 UA 3+ leukocytes, 92 WBCs blood cultures urine culture sent.  Right foot x-ray  Osseous destruction of the distal 2nd phalanx is increased, Soft tissue swelling of the forefoot and 2nd digit.  IV Zosyn, IV vancomycin, IV morphine, tramadol given.  Admit to hospital medicine for  further medical management rule out osteomyelitis

## 2024-09-03 NOTE — ASSESSMENT & PLAN NOTE
"Patient's FSGs are uncontrolled due to hyperglycemia on current medication regimen.  Last A1c reviewed-   Lab Results   Component Value Date    HGBA1C 7.2 (H) 11/02/2023     Most recent fingerstick glucose reviewed- No results for input(s): "POCTGLUCOSE" in the last 24 hours.  Current correctional scale  Low  Maintain anti-hyperglycemic dose as follows-   Antihyperglycemics (From admission, onward)      Start     Stop Route Frequency Ordered    09/03/24 1713  insulin aspart U-100 pen 0-5 Units         -- SubQ Before meals & nightly PRN 09/03/24 1618          Hold Oral hypoglycemics while patient is in the hospital.     Diabetic teaching needed  Social Service consulted patient reports he is homeless and has no diabetic supplies    "

## 2024-09-03 NOTE — PROGRESS NOTES
"Pharmacokinetic Initial Assessment: IV Vancomycin    Assessment/Plan:    Initiate intravenous vancomycin with loading dose of 2000 mg once   Desired empiric serum trough concentration is 10 to 15 mcg/mL  Draw vancomycin random level on 9/4 at 1500.  Pharmacy will continue to follow and monitor vancomycin.      Please contact pharmacy at extension [phone] with any questions regarding this assessment.     Thank you for the consult,   Roxanne Osorio       Patient brief summary:  Tolu Lyles Sr. is a 54 y.o. male initiated on antimicrobial therapy with IV Vancomycin for treatment of suspected skin & soft tissue infection    Drug Allergies:   Review of patient's allergies indicates:   Allergen Reactions    Codeine Swelling, Anaphylaxis, Hives and Other (See Comments)     Throat swell, rash       Actual Body Weight:   113.4 kg    Renal Function:   Estimated Creatinine Clearance: 77.8 mL/min (based on SCr of 1.4 mg/dL).,     Dialysis Method (if applicable):  N/A    CBC (last 72 hours):  Recent Labs   Lab Result Units 09/03/24  1201   WBC K/uL 11.25   Hemoglobin g/dL 15.0   Hematocrit % 45.6   Platelets K/uL 208   Gran % % 75.4*   Lymph % % 17.3*   Mono % % 5.3   Eosinophil % % 0.8   Basophil % % 0.6   Differential Method  Automated       Metabolic Panel (last 72 hours):  Recent Labs   Lab Result Units 09/03/24  1201 09/03/24  1249   Sodium mmol/L 135*  --    Potassium mmol/L 4.5  --    Chloride mmol/L 104  --    CO2 mmol/L 25  --    Glucose mg/dL 210*  --    Glucose, UA   --  Trace*   BUN mg/dL 37*  --    Creatinine mg/dL 1.4  --    Albumin g/dL 4.2  --    Total Bilirubin mg/dL 0.5  --    Alkaline Phosphatase U/L 57  --    AST U/L 9*  --    ALT U/L 7*  --        Drug levels (last 3 results):  No results for input(s): "VANCOMYCINRA", "VANCORANDOM", "VANCOMYCINPE", "VANCOPEAK", "VANCOMYCINTR", "VANCOTROUGH" in the last 72 hours.    Microbiologic Results:  Microbiology Results (last 7 days)       Procedure Component Value " Units Date/Time    Urine culture [[id number]] Collected: 09/03/24 1249    Order Status: No result Specimen: Urine Updated: 09/03/24 1459    Blood culture #2 **CANNOT BE ORDERED STAT** [[id number]] Collected: 09/03/24 1159    Order Status: Sent Specimen: Blood from Peripheral, Antecubital, Right Updated: 09/03/24 1208    Blood culture #1 **CANNOT BE ORDERED STAT** [[id number]] Collected: 09/03/24 1159    Order Status: Sent Specimen: Blood from Peripheral, Hand, Left Updated: 09/03/24 1208

## 2024-09-03 NOTE — ED PROVIDER NOTES
Encounter Date: 9/3/2024       History     Chief Complaint   Patient presents with    Toe Pain     RT 2ND TOE, BLACK AND PART OF IT FELL OFF     This is a 54-year-old male with history of diabetes, coronary artery disease, COPD, obstructive sleep apnea who comes in complaining of swelling and redness to his foot.  Patient reports that he had an infected 2nd toe.  Patient reports that it got black and a part of it fell off.  Reports that he is now having purulent drainage from it.  He is also noted increased warmth and swelling to the foot.  He denies any fevers or chills.  He denies any exacerbating or alleviating factors otherwise.      Review of patient's allergies indicates:   Allergen Reactions    Codeine Swelling, Anaphylaxis, Hives and Other (See Comments)     Throat swell, rash     Past Medical History:   Diagnosis Date    Chronic back pain     COPD (chronic obstructive pulmonary disease)     Coronary artery disease     Diabetes mellitus     Lung nodule     Sleep apnea     Unspecified injury of head, initial encounter 1975     Past Surgical History:   Procedure Laterality Date    BRONCHOSCOPY WITH FLUOROSCOPY N/A 10/25/2023    Procedure: BRONCHOSCOPY, WITH FLUOROSCOPY;  Surgeon: Alice Holman MD;  Location: [facility];  Service: Pulmonary;  Laterality: N/A;    CARDIAC SURGERY  2001    CARIDAC STENTS 7 ;  TEXARCANA     Family History   Problem Relation Name Age of Onset    Diabetes Mother      Hypertension Mother      Heart disease Mother      Stroke Mother      Diabetes Father      Hypertension Father      Heart disease Father      Stroke Father      Cancer Maternal Uncle       Social History     Tobacco Use    Smoking status: Every Day     Current packs/day: 0.50     Average packs/day: 0.5 packs/day for 51.7 years (25.8 ttl pk-yrs)     Types: Cigarettes     Start date: 1/1/1973    Smokeless tobacco: Never    Tobacco comments:     Smokes 10 cigarettes daily   Substance Use Topics    Alcohol use: Not  Currently    Drug use: Never     Review of Systems   Constitutional:  Negative for chills and fever.   HENT:  Negative for congestion, sore throat and trouble swallowing.    Respiratory:  Negative for cough and shortness of breath.    Cardiovascular:  Negative for chest pain and palpitations.   Gastrointestinal:  Negative for abdominal pain, diarrhea, nausea and vomiting.   Genitourinary:  Negative for dysuria and flank pain.   Musculoskeletal:  Negative for back pain and neck pain.        Toe pain and foot swelling as per HPI.   Neurological:  Negative for weakness, numbness and headaches.   Psychiatric/Behavioral:  Negative for agitation and confusion.    All other systems reviewed and are negative.      Physical Exam     Initial Vitals [09/03/24 1108]   BP Pulse Resp Temp SpO2   129/73 83 18 98.6 °F (37 °C) 96 %      MAP       --         Physical Exam    Nursing note and vitals reviewed.  Constitutional: Vital signs are normal. He appears well-developed and well-nourished.  Non-toxic appearance. No distress.   HENT:   Head: Normocephalic and atraumatic.   Mouth/Throat: Oropharynx is clear and moist.   Eyes: EOM are normal. Pupils are equal, round, and reactive to light.   Neck: Neck supple.   Normal range of motion.  Cardiovascular:  Normal rate, regular rhythm and intact distal pulses.           Pulmonary/Chest: Breath sounds normal. He has no wheezes.   Musculoskeletal:         General: No tenderness or edema. Normal range of motion.      Cervical back: Normal range of motion and neck supple. No rigidity. No muscular tenderness.     Lymphadenopathy:     He has no cervical adenopathy.     He has no axillary adenopathy.   Neurological: He is alert and oriented to person, place, and time. He has normal strength. No cranial nerve deficit or sensory deficit. Gait normal.   Skin: Skin is warm, dry and intact.   Right foot swelling and erythema noted over the dorsal aspect.  Warmth and erythema noted over the right  2nd toe with purulent drainage.  No gangrene noted.  2+ pulses.   Psychiatric: He has a normal mood and affect. His behavior is normal.         ED Course   Procedures  Labs Reviewed   CBC W/ AUTO DIFFERENTIAL - Abnormal       Result Value    WBC 11.25      RBC 4.89      Hemoglobin 15.0      Hematocrit 45.6      MCV 93      MCH 30.7      MCHC 32.9      RDW 14.8 (*)     Platelets 208      MPV 11.4      Immature Granulocytes 0.6 (*)     Gran # (ANC) 8.5 (*)     Immature Grans (Abs) 0.07 (*)     Lymph # 2.0      Mono # 0.6      Eos # 0.1      Baso # 0.07      nRBC 0      Gran % 75.4 (*)     Lymph % 17.3 (*)     Mono % 5.3      Eosinophil % 0.8      Basophil % 0.6      Differential Method Automated     COMPREHENSIVE METABOLIC PANEL - Abnormal    Sodium 135 (*)     Potassium 4.5      Chloride 104      CO2 25      Glucose 210 (*)     BUN 37 (*)     Creatinine 1.4      Calcium 9.2      Total Protein 7.1      Albumin 4.2      Total Bilirubin 0.5      Alkaline Phosphatase 57      AST 9 (*)     ALT 7 (*)     eGFR 59.0 (*)     Anion Gap 6 (*)    C-REACTIVE PROTEIN - Abnormal    CRP 2.10 (*)    URINALYSIS, REFLEX TO URINE CULTURE - Abnormal    Specimen UA Urine, Clean Catch      Color, UA Yellow      Appearance, UA Clear      pH, UA 6.0      Specific Gravity, UA 1.015      Protein, UA Negative      Glucose, UA Trace (*)     Ketones, UA Negative      Bilirubin (UA) Negative      Occult Blood UA Negative      Nitrite, UA Negative      Urobilinogen, UA Negative      Leukocytes, UA 3+ (*)     Narrative:     Specimen Source->Urine   URINALYSIS MICROSCOPIC - Abnormal    RBC, UA 6 (*)     WBC, UA 92 (*)     Yeast, UA Few (*)     Squam Epithel, UA 0      Microscopic Comment SEE COMMENT      Narrative:     Specimen Source->Urine   POCT GLUCOSE - Abnormal    POC Glucose 221 (*)    CULTURE, BLOOD   CULTURE, BLOOD   CULTURE, URINE   LACTIC ACID, PLASMA    Lactate (Lactic Acid) 1.4     SEDIMENTATION RATE    Sed Rate 9            Imaging  Results              X-Ray Foot Complete Right (Final result)  Result time 09/03/24 11:49:17      Final result by Abel Boyd DO (09/03/24 11:49:17)                   Impression:      1. Osseous destruction of the distal 2nd phalanx is increased from previous exam.  2. Soft tissue swelling of the forefoot and 2nd digit.      Electronically signed by: Abel Boyd  Date:    09/03/2024  Time:    11:49               Narrative:    EXAMINATION:  XR FOOT COMPLETE 3 VIEW RIGHT    CLINICAL HISTORY:  Pain in unspecified toe(s)    FINDINGS:  Three views of the right foot with comparison radiograph 08/28/2024. There is osseous destruction of the distal 2nd phalanx, increased from previous exam.  No other definite destructive osseous lesion observed.  There is soft tissue swelling of the 2nd digit.  Soft tissue swelling of the forefoot.  No radiopaque foreign bodies.                                       Medications   piperacillin-tazobactam 3.375 g in dextrose 5 % 100 mL IVPB (ready to mix) (has no administration in time range)   vancomycin - pharmacy to dose (has no administration in time range)   traMADoL tablet 50 mg (50 mg Oral Given 9/3/24 1218)   morphine injection 4 mg (4 mg Intravenous Given 9/3/24 1455)   ondansetron injection 4 mg (4 mg Intravenous Given 9/3/24 1455)     Medical Decision Making  This is a 54-year-old male with history of diabetes, coronary artery disease, COPD, obstructive sleep apnea comes in complaining of foot pain and swelling and toe pain.  On examination his vitals are stable.  On physical exam patient is afebrile.  On physical examination he has dorsal erythema and swelling to his foot.  He does have purulent drainage from his toe.  Exam is normal otherwise.    Orders included CBC, CMP, ESR, CRP.  Lactic acid and blood cultures were ordered.  X-ray of the foot was ordered.    Comorbidities contributing to patient's presentation include coronary artery disease, diabetes, COPD,  obstructive sleep apnea.  Acute exacerbation of chronic illness:  Patient is hyperglycemic.    I reviewed patient's external medical notes.  He was evaluated in the ER in August 28th with concern for osteomyelitis.  He left against medical advice.    Differential diagnosis includes gangrene, osteomyelitis, sepsis, DKA.    Amount and/or Complexity of Data Reviewed  External Data Reviewed: labs, radiology and notes.     Details: As detailed above.  Labs: ordered.     Details: Labs were reviewed were significant for an elevated ESR and CRP.  White count was normal.  Glucose was elevated with normal anion gap and CO2.  Radiology: ordered and independent interpretation performed.     Details: X-ray of the foot was independently reviewed by me and showed soft tissue swelling with bony erosions over the 2nd toe.  Discussion of management or test interpretation with external provider(s): Case was discussed with the hospitalist who will admit the patient.    Risk  Prescription drug management.  Decision regarding hospitalization.  Risk Details: MDM continued:  Patient's workup is concerning for osteomyelitis of his toe with cellulitis of his foot.  He was cultured and given broad-spectrum antibiotics.  He was admitted.                                      Clinical Impression:  Final diagnoses:  [M79.676] Toe pain  [M86.9] Osteomyelitis of toe (Primary)  [L03.115] Cellulitis of right foot          ED Disposition Condition    Admit Stable                Svetlana Mack MD  09/03/24 1628

## 2024-09-03 NOTE — PHARMACY MED REC
"Admission Medication History     The home medication history was taken by Guido Ramos.    You may go to "Admission" then "Reconcile Home Medications" tabs to review and/or act upon these items.     The home medication list has been updated by the Pharmacy department.   Please read ALL comments highlighted in yellow.   Please address this information as you see fit.    Feel free to contact us if you have any questions or require assistance.      The medications listed below were removed from the home medication list. Please reorder if appropriate:  Patient reports no longer taking the following medication(s):  Albuterol Neb  Trelegy Ellipta  Robaxin 750 mg  Reglan 10 mg  Omeprazole 40 mg    Medications listed below were obtained from: Patient/family and Analytic software- NERITES  No current facility-administered medications on file prior to encounter.     Current Outpatient Medications on File Prior to Encounter   Medication Sig Dispense Refill    furosemide (LASIX) 40 MG tablet Take 40 mg by mouth once daily.      gabapentin (NEURONTIN) 400 MG capsule Take 2 capsules (800 mg total) by mouth 3 (three) times daily. 180 capsule 0    lisinopriL 10 MG tablet Take 10 mg by mouth once daily.      metFORMIN (GLUCOPHAGE) 1000 MG tablet Take 1,000 mg by mouth 2 (two) times daily with meals.      potassium chloride SA (K-DUR,KLOR-CON) 20 MEQ tablet Take 20 mEq by mouth once daily.      ALPRAZolam (XANAX) 0.25 MG tablet Take 1 tablet (0.25 mg total) by mouth 2 (two) times daily as needed for Anxiety. (Patient not taking: Reported on 9/3/2024) 10 tablet 0    [DISCONTINUED] albuterol (PROVENTIL) 2.5 mg /3 mL (0.083 %) nebulizer solution Take 3 mLs (2.5 mg total) by nebulization every 6 (six) hours as needed for Wheezing. Rescue 120 mL 3    [DISCONTINUED] fluticasone-umeclidin-vilanter (TRELEGY ELLIPTA) 200-62.5-25 mcg inhaler Inhale 1 puff into the lungs once daily. (Patient not taking: Reported on 12/13/2023) 60 each 0    " [DISCONTINUED] methocarbamoL (ROBAXIN) 750 MG Tab Take 750 mg by mouth 2 (two) times daily.      [DISCONTINUED] metoclopramide HCl (REGLAN) 10 MG tablet Take 10 mg by mouth 2 (two) times a day.      [DISCONTINUED] omeprazole (PRILOSEC) 40 MG capsule Take 40 mg by mouth once daily.           Guido Ramos  EXT [id number]                  .

## 2024-09-03 NOTE — ASSESSMENT & PLAN NOTE
MRI right foot ordered  IV Zosyn and vancomycin started  Consult  Wound Care, podiatry  IV hydration, pain medication  Right foot x-ray: Osseous destruction of the distal 2nd phalanx is increased from previous exam.  2. Soft tissue swelling of the forefoot and 2nd digit.  CRP elevated

## 2024-09-03 NOTE — SUBJECTIVE & OBJECTIVE
Past Medical History:   Diagnosis Date    Chronic back pain     COPD (chronic obstructive pulmonary disease)     Coronary artery disease     Diabetes mellitus     Lung nodule     Sleep apnea     Unspecified injury of head, initial encounter 1975       Past Surgical History:   Procedure Laterality Date    BRONCHOSCOPY WITH FLUOROSCOPY N/A 10/25/2023    Procedure: BRONCHOSCOPY, WITH FLUOROSCOPY;  Surgeon: Alice Holman MD;  Location: [facility];  Service: Pulmonary;  Laterality: N/A;    CARDIAC SURGERY  2001    CARIDAC STENTS 7 ;  TEXARCANA       Review of patient's allergies indicates:   Allergen Reactions    Codeine Swelling, Anaphylaxis, Hives and Other (See Comments)     Throat swell, rash    Metformin        No current facility-administered medications on file prior to encounter.     Current Outpatient Medications on File Prior to Encounter   Medication Sig    furosemide (LASIX) 40 MG tablet Take 40 mg by mouth once daily.    gabapentin (NEURONTIN) 400 MG capsule Take 2 capsules (800 mg total) by mouth 3 (three) times daily.    lisinopriL 10 MG tablet Take 10 mg by mouth once daily.    metFORMIN (GLUCOPHAGE) 1000 MG tablet Take 1,000 mg by mouth 2 (two) times daily with meals.    potassium chloride SA (K-DUR,KLOR-CON) 20 MEQ tablet Take 20 mEq by mouth once daily.    ALPRAZolam (XANAX) 0.25 MG tablet Take 1 tablet (0.25 mg total) by mouth 2 (two) times daily as needed for Anxiety. (Patient not taking: Reported on 9/3/2024)    [DISCONTINUED] albuterol (PROVENTIL) 2.5 mg /3 mL (0.083 %) nebulizer solution Take 3 mLs (2.5 mg total) by nebulization every 6 (six) hours as needed for Wheezing. Rescue    [DISCONTINUED] fluticasone-umeclidin-vilanter (TRELEGY ELLIPTA) 200-62.5-25 mcg inhaler Inhale 1 puff into the lungs once daily. (Patient not taking: Reported on 12/13/2023)    [DISCONTINUED] methocarbamoL (ROBAXIN) 750 MG Tab Take 750 mg by mouth 2 (two) times daily.    [DISCONTINUED]  metoclopramide HCl (REGLAN) 10 MG tablet Take 10 mg by mouth 2 (two) times a day.    [DISCONTINUED] omeprazole (PRILOSEC) 40 MG capsule Take 40 mg by mouth once daily.     Family History       Problem Relation (Age of Onset)    Cancer Maternal Uncle    Diabetes Mother, Father    Heart disease Mother, Father    Hypertension Mother, Father    Stroke Mother, Father          Tobacco Use    Smoking status: Former     Current packs/day: 0.00     Average packs/day: 0.5 packs/day for 51.7 years (25.8 ttl pk-yrs)     Types: Cigarettes     Start date: 1/1/1973     Quit date: 9/2/2024    Smokeless tobacco: Never    Tobacco comments:     Smokes 10 cigarettes daily   Substance and Sexual Activity    Alcohol use: Not Currently    Drug use: Never    Sexual activity: Not Currently     Review of Systems   Constitutional: Negative.    HENT: Negative.     Respiratory: Negative.     Cardiovascular: Negative.    Gastrointestinal: Negative.    Genitourinary: Negative.    Musculoskeletal:         Right foot swelling, toe pain   Skin:  Positive for wound.   Neurological: Negative.    Psychiatric/Behavioral: Negative.       Objective:     Vital Signs (Most Recent):  Temp: 98.6 °F (37 °C) (09/03/24 1108)  Pulse: (!) 56 (09/03/24 1539)  Resp: 17 (09/03/24 1539)  BP: 119/72 (09/03/24 1539)  SpO2: 96 % (09/03/24 1539) Vital Signs (24h Range):  Temp:  [98.6 °F (37 °C)] 98.6 °F (37 °C)  Pulse:  [56-83] 56  Resp:  [16-18] 17  SpO2:  [95 %-96 %] 96 %  BP: (105-129)/(72-76) 119/72     Weight: 113.4 kg (250 lb)  Body mass index is 32.98 kg/m².     Physical Exam  Constitutional:       General: He is not in acute distress.     Appearance: He is obese.   HENT:      Head: Normocephalic and atraumatic.      Mouth/Throat:      Mouth: Mucous membranes are moist.   Eyes:      Extraocular Movements: Extraocular movements intact.      Pupils: Pupils are equal, round, and reactive to light.   Cardiovascular:      Rate and Rhythm: Normal rate and regular  rhythm.      Pulses: Normal pulses.      Heart sounds: Murmur heard.   Pulmonary:      Breath sounds: Examination of the right-upper field reveals rales. Examination of the left-upper field reveals rales. Examination of the left-middle field reveals wheezing. Examination of the right-lower field reveals wheezing. Examination of the left-lower field reveals wheezing. Wheezing and rales present.   Abdominal:      General: Bowel sounds are normal. There is no distension.      Palpations: Abdomen is soft.      Tenderness: There is no abdominal tenderness.   Musculoskeletal:         General: Normal range of motion.      Cervical back: Neck supple.      Right lower leg: Edema present.   Skin:     General: Skin is warm.      Capillary Refill: Capillary refill takes less than 2 seconds.      Comments: .  Foot with edema, erythema, right 2nd toe warm to touch tender to touch, erythema and edema with purulent drainage.  2+ pulses in the right foot   Neurological:      General: No focal deficit present.      Mental Status: He is alert. Mental status is at baseline.   Psychiatric:         Mood and Affect: Mood normal.          CRANIAL NERVES     CN III, IV, VI   Pupils are equal, round, and reactive to light.     Significant Labs: All pertinent labs within the past 24 hours have been reviewed.  Recent Lab Results         09/03/24  1249   09/03/24  1209   09/03/24  1201   09/03/24  1115        Albumin     4.2         ALP     57         ALT     7         Anion Gap     6         Appearance, UA Clear             AST     9         Baso #     0.07         Basophil %     0.6         Bilirubin (UA) Negative             BILIRUBIN TOTAL     0.5  Comment: For infants and newborns, interpretation of results should be based  on gestational age, weight and in agreement with clinical  observations.    Premature Infant recommended reference ranges:  Up to 24 hours.............<8.0 mg/dL  Up to 48 hours............<12.0 mg/dL  3-5  days..................<15.0 mg/dL  6-29 days.................<15.0 mg/dL           BUN     37         Calcium     9.2         Chloride     104         CO2     25         Color, UA Yellow             Creatinine     1.4         CRP   2.10  Comment: CRP-Normal Application expected values:   <1.0        mg/dL   Normal Range  1.0 - 5.0  mg/dL   Indicates mild inflammation  5.0 - 10.0 mg/dL   Indicates severe inflammation  >10.0        mg/dL   Represents serious processes and   frequently         indicates the presence of a bacterial   infection.              Differential Method     Automated         eGFR     59.0         Eos #     0.1         Eos %     0.8         Glucose     210         Glucose, UA Trace             Gran # (ANC)     8.5         Gran %     75.4         Hematocrit     45.6         Hemoglobin     15.0         Immature Grans (Abs)     0.07  Comment: Mild elevation in immature granulocytes is non specific and   can be seen in a variety of conditions including stress response,   acute inflammation, trauma and pregnancy. Correlation with other   laboratory and clinical findings is essential.           Immature Granulocytes     0.6         Ketones, UA Negative             Lactic Acid Level     1.4  Comment: Falsely low lactic acid results can be found in samples   containing >=13.0 mg/dL total bilirubin and/or >=3.5 mg/dL   direct bilirubin.           Leukocyte Esterase, UA 3+             Lymph #     2.0         Lymph %     17.3         MCH     30.7         MCHC     32.9         MCV     93         Microscopic Comment SEE COMMENT  Comment: Other formed elements not mentioned in the report are not   present in the microscopic examination.                Mono #     0.6         Mono %     5.3         MPV     11.4         NITRITE UA Negative             nRBC     0         Blood, UA Negative             pH, UA 6.0             Platelet Count     208         POC Glucose       221       Potassium     4.5         PROTEIN  TOTAL     7.1         Protein, UA Negative  Comment: Recommend a 24 hour urine protein or a urine   protein/creatinine ratio if globulin induced proteinuria is  clinically suspected.               RBC     4.89         RBC, UA 6             RDW     14.8         Sed Rate   9           Sodium     135         Spec Grav UA 1.015             Specimen UA Urine, Clean Catch             Squam Epithel, UA 0             UROBILINOGEN UA Negative             WBC, UA 92             WBC     11.25         Yeast, UA Few                     Significant Imaging: I have reviewed all pertinent imaging results/findings within the past 24 hours.  Imaging Results              X-Ray Foot Complete Right (Final result)  Result time 09/03/24 11:49:17      Final result by Abel Boyd DO (09/03/24 11:49:17)                   Impression:      1. Osseous destruction of the distal 2nd phalanx is increased from previous exam.  2. Soft tissue swelling of the forefoot and 2nd digit.      Electronically signed by: Abel Boyd  Date:    09/03/2024  Time:    11:49               Narrative:    EXAMINATION:  XR FOOT COMPLETE 3 VIEW RIGHT    CLINICAL HISTORY:  Pain in unspecified toe(s)    FINDINGS:  Three views of the right foot with comparison radiograph 08/28/2024. There is osseous destruction of the distal 2nd phalanx, increased from previous exam.  No other definite destructive osseous lesion observed.  There is soft tissue swelling of the 2nd digit.  Soft tissue swelling of the forefoot.  No radiopaque foreign bodies.

## 2024-09-03 NOTE — PLAN OF CARE
[facility]  Initial Discharge Assessment       Primary Care Provider: No, Primary Doctor    Admission Diagnosis: Osteomyelitis of toe [M86.9]    Admission Date: 9/3/2024  Expected Discharge Date:      met with Pt at bedside to complete discharge assessment. Pt AAOx4s. Demographics, PCP, and insurance verified. No home health. No dialysis. Pt reports ability to complete ADLs without assistance. Pt verbalized plan to discharge home via family transport. Pt reports that he is sometimes unable to complete standard day to day errands due to lack of transportation. Pt expressed that he can benefit for rental assistance because he currently owes his apartment complex. Pt reports he is need or a rolator. Pt is requesting a PCP, Pain Management appointment, Pulmonology appointment, Podiatrist appointment and urolgoy. SW consulted with physician. Physician is unsure of the Pt length of stay for these appointments to be scheduled now. SW will provide Pt with printed resources for transportation, food and rental assistance.   Pt has no other needs to be addressed at this time.     Transition of Care Barriers: (P) None    Payor: [employer] MANAGED MEDICARE / Plan: [employer] MEDICARE [facility] / Product Type: Medicare Advantage /     Extended Emergency Contact Information  Primary Emergency Contact: ADRIANE BARNEY Jr.  Mobile Phone: [phone]  Relation: Son  Preferred language: English   needed? No    Discharge Plan A: (P) Home  Discharge Plan B: (P) Home with family      [id number] [address] AT [facility]  [address] [facility] [address]  Phone: [phone] Fax: [phone]      Initial Assessment (most recent)       Adult Discharge Assessment - 09/03/24 1707          Discharge Assessment    Assessment Type Discharge Planning Assessment     Confirmed/corrected address, phone number and insurance Yes     Confirmed Demographics Correct on  Facesheet     Source of Information patient     When was your last doctors appointment? --   Pt has no PCP    Communicated SANDRA with patient/caregiver Date not available/Unable to determine     Reason For Admission Cellulitis of second toe of right foot     People in Home alone     Facility Arrived From: Home     Do you expect to return to your current living situation? Yes     Do you have help at home or someone to help you manage your care at home? No     Prior to hospitilization cognitive status: Alert/Oriented     Current cognitive status: Alert/Oriented     Walking or Climbing Stairs Difficulty no (P)      Dressing/Bathing Difficulty no (P)      Do you have any problems with: Errands/Grocery (P)      Home Accessibility wheelchair accessible (P)      Home Layout Able to live on 1st floor (P)      Equipment Currently Used at Home none (P)      Readmission within 30 days? No (P)      Patient currently being followed by outpatient case management? No (P)      Do you currently have service(s) that help you manage your care at home? No (P)      Do you take prescription medications? Yes (P)      Do you have prescription coverage? Yes (P)      Coverage Payor: [employer] MEDICARE - [employer] [place]IC[place] [facility] [place] (P)      Do you have any problems affording any of your prescribed medications? No (P)      Is the patient taking medications as prescribed? yes (P)      Who is going to help you get home at discharge? Father in law: Harman Salomon [phone] (P)      How do you get to doctors appointments? family or friend will provide (P)      Are you on dialysis? No (P)      Do you take coumadin? No (P)      Discharge Plan A Home (P)      Discharge Plan B Home with family (P)      DME Needed Upon Discharge  rollator (P)      Discharge Plan discussed with: Patient (P)      Transition of Care Barriers None (P)         Physical Activity    On average, how many days per week do you engage in moderate to strenuous exercise (like a brisk  walk)? 7 days (P)      On average, how many minutes do you engage in exercise at this level? 20 min (P)         Financial Resource Strain    How hard is it for you to pay for the very basics like food, housing, medical care, and heating? Somewhat hard (P)         Housing Stability    In the last 12 months, was there a time when you were not able to pay the mortgage or rent on time? Yes (P)      At any time in the past 12 months, were you homeless or living in a shelter (including now)? No (P)         Transportation Needs    Has the lack of transportation kept you from medical appointments, meetings, work or from getting things needed for daily living? Yes, it has kept me from non-medical meetings, appointments, work or from getting things that I need. (P)         Food Insecurity    Within the past 12 months, you worried that your food would run out before you got the money to buy more. Sometimes true (P)      Within the past 12 months, the food you bought just didn't last and you didn't have money to get more. Never true (P)         Stress    Do you feel stress - tense, restless, nervous, or anxious, or unable to sleep at night because your mind is troubled all the time - these days? Only a little (P)         Social Isolation    How often do you feel lonely or isolated from those around you?  Never (P)         Alcohol Use    Q1: How often do you have a drink containing alcohol? 2-4 times a month (P)      Q2: How many drinks containing alcohol do you have on a typical day when you are drinking? 1 or 2 (P)      Q3: How often do you have six or more drinks on one occasion? Never (P)         [employer]    In the past 12 months has the electric, gas, oil, or water company threatened to shut off services in your home? No (P)         Health Literacy    How often do you need to have someone help you when you read instructions, pamphlets, or other written material from your doctor or pharmacy? Never (P)

## 2024-09-03 NOTE — NURSING
Nurses Note -- 4 Eyes      9/3/2024   5:52 PM      Skin assessed during: Admit      [] No Altered Skin Integrity Present    []Prevention Measures Documented      [x] Yes- Altered Skin Integrity Present or Discovered   [] LDA Added if Not in Epic (Describe Wound)   [] New Altered Skin Integrity was Present on Admit and Documented in LDA   [] Wound Image Taken    Wound Care Consulted? Yes    Attending Nurse:  Manju Palomino RN/Staff Member:  Katiuska YARBROUGH

## 2024-09-04 PROBLEM — M86.9 OSTEOMYELITIS OF TOE OF RIGHT FOOT: Status: ACTIVE | Noted: 2024-09-03

## 2024-09-04 LAB
ANION GAP SERPL CALC-SCNC: 8 MMOL/L (ref 8–16)
BASOPHILS # BLD AUTO: 0.05 K/UL (ref 0–0.2)
BASOPHILS NFR BLD: 0.6 % (ref 0–1.9)
BUN SERPL-MCNC: 25 MG/DL (ref 6–20)
CALCIUM SERPL-MCNC: 8.7 MG/DL (ref 8.7–10.5)
CHLORIDE SERPL-SCNC: 103 MMOL/L (ref 95–110)
CO2 SERPL-SCNC: 27 MMOL/L (ref 23–29)
CREAT SERPL-MCNC: 1.2 MG/DL (ref 0.5–1.4)
DIFFERENTIAL METHOD BLD: ABNORMAL
EOSINOPHIL # BLD AUTO: 0.2 K/UL (ref 0–0.5)
EOSINOPHIL NFR BLD: 2.7 % (ref 0–8)
ERYTHROCYTE [DISTWIDTH] IN BLOOD BY AUTOMATED COUNT: 14.7 % (ref 11.5–14.5)
EST. GFR  (NO RACE VARIABLE): >60 ML/MIN/1.73 M^2
ESTIMATED AVG GLUCOSE: 177 MG/DL (ref 68–131)
GLUCOSE SERPL-MCNC: 178 MG/DL (ref 70–110)
GLUCOSE SERPL-MCNC: 182 MG/DL (ref 70–110)
HBA1C MFR BLD: 7.8 % (ref 4.5–6.2)
HCT VFR BLD AUTO: 44.5 % (ref 40–54)
HGB BLD-MCNC: 14.3 G/DL (ref 14–18)
IMM GRANULOCYTES # BLD AUTO: 0.06 K/UL (ref 0–0.04)
IMM GRANULOCYTES NFR BLD AUTO: 0.8 % (ref 0–0.5)
LYMPHOCYTES # BLD AUTO: 1.9 K/UL (ref 1–4.8)
LYMPHOCYTES NFR BLD: 24.1 % (ref 18–48)
MCH RBC QN AUTO: 30.6 PG (ref 27–31)
MCHC RBC AUTO-ENTMCNC: 32.1 G/DL (ref 32–36)
MCV RBC AUTO: 95 FL (ref 82–98)
MONOCYTES # BLD AUTO: 0.5 K/UL (ref 0.3–1)
MONOCYTES NFR BLD: 6.6 % (ref 4–15)
NEUTROPHILS # BLD AUTO: 5 K/UL (ref 1.8–7.7)
NEUTROPHILS NFR BLD: 65.2 % (ref 38–73)
NRBC BLD-RTO: 0 /100 WBC
PLATELET # BLD AUTO: 187 K/UL (ref 150–450)
PMV BLD AUTO: 11.6 FL (ref 9.2–12.9)
POCT GLUCOSE: 156 MG/DL (ref 70–110)
POCT GLUCOSE: 160 MG/DL (ref 70–110)
POCT GLUCOSE: 224 MG/DL (ref 70–110)
POTASSIUM SERPL-SCNC: 4.9 MMOL/L (ref 3.5–5.1)
RBC # BLD AUTO: 4.67 M/UL (ref 4.6–6.2)
SODIUM SERPL-SCNC: 138 MMOL/L (ref 136–145)
WBC # BLD AUTO: 7.71 K/UL (ref 3.9–12.7)

## 2024-09-04 PROCEDURE — 85025 COMPLETE CBC W/AUTO DIFF WBC: CPT | Performed by: NURSE PRACTITIONER

## 2024-09-04 PROCEDURE — 99223 1ST HOSP IP/OBS HIGH 75: CPT | Mod: ,,, | Performed by: PODIATRIST

## 2024-09-04 PROCEDURE — 83036 HEMOGLOBIN GLYCOSYLATED A1C: CPT | Performed by: NURSE PRACTITIONER

## 2024-09-04 PROCEDURE — 36415 COLL VENOUS BLD VENIPUNCTURE: CPT | Performed by: NURSE PRACTITIONER

## 2024-09-04 PROCEDURE — 12000002 HC ACUTE/MED SURGE SEMI-PRIVATE ROOM

## 2024-09-04 PROCEDURE — 94640 AIRWAY INHALATION TREATMENT: CPT

## 2024-09-04 PROCEDURE — 90715 TDAP VACCINE 7 YRS/> IM: CPT | Performed by: INTERNAL MEDICINE

## 2024-09-04 PROCEDURE — 80048 BASIC METABOLIC PNL TOTAL CA: CPT | Performed by: NURSE PRACTITIONER

## 2024-09-04 PROCEDURE — 25000003 PHARM REV CODE 250: Performed by: NURSE PRACTITIONER

## 2024-09-04 PROCEDURE — 94761 N-INVAS EAR/PLS OXIMETRY MLT: CPT

## 2024-09-04 PROCEDURE — 25000242 PHARM REV CODE 250 ALT 637 W/ HCPCS: Performed by: NURSE PRACTITIONER

## 2024-09-04 PROCEDURE — 63600175 PHARM REV CODE 636 W HCPCS

## 2024-09-04 PROCEDURE — 63600175 PHARM REV CODE 636 W HCPCS: Performed by: INTERNAL MEDICINE

## 2024-09-04 PROCEDURE — 63600175 PHARM REV CODE 636 W HCPCS: Performed by: NURSE PRACTITIONER

## 2024-09-04 PROCEDURE — 63600175 PHARM REV CODE 636 W HCPCS: Performed by: EMERGENCY MEDICINE

## 2024-09-04 PROCEDURE — 25000003 PHARM REV CODE 250

## 2024-09-04 PROCEDURE — 99900031 HC PATIENT EDUCATION (STAT)

## 2024-09-04 PROCEDURE — 25000003 PHARM REV CODE 250: Performed by: EMERGENCY MEDICINE

## 2024-09-04 PROCEDURE — 99900035 HC TECH TIME PER 15 MIN (STAT)

## 2024-09-04 PROCEDURE — 90471 IMMUNIZATION ADMIN: CPT | Performed by: INTERNAL MEDICINE

## 2024-09-04 RX ORDER — HYDROCODONE BITARTRATE AND ACETAMINOPHEN 5; 325 MG/1; MG/1
1 TABLET ORAL EVERY 6 HOURS PRN
Status: DISCONTINUED | OUTPATIENT
Start: 2024-09-04 | End: 2024-09-04

## 2024-09-04 RX ORDER — OXYCODONE HCL 10 MG/1
10 TABLET, FILM COATED, EXTENDED RELEASE ORAL EVERY 12 HOURS
Status: DISCONTINUED | OUTPATIENT
Start: 2024-09-04 | End: 2024-09-04

## 2024-09-04 RX ORDER — INSULIN GLARGINE 100 [IU]/ML
8 INJECTION, SOLUTION SUBCUTANEOUS NIGHTLY
Status: DISCONTINUED | OUTPATIENT
Start: 2024-09-04 | End: 2024-09-06 | Stop reason: HOSPADM

## 2024-09-04 RX ORDER — SODIUM CHLORIDE 0.9 % (FLUSH) 0.9 %
10 SYRINGE (ML) INJECTION
Status: CANCELLED | OUTPATIENT
Start: 2024-09-04

## 2024-09-04 RX ORDER — INSULIN ASPART 100 [IU]/ML
5 INJECTION, SOLUTION INTRAVENOUS; SUBCUTANEOUS
Status: DISCONTINUED | OUTPATIENT
Start: 2024-09-04 | End: 2024-09-06 | Stop reason: HOSPADM

## 2024-09-04 RX ORDER — MORPHINE SULFATE 4 MG/ML
4 INJECTION, SOLUTION INTRAMUSCULAR; INTRAVENOUS EVERY 6 HOURS PRN
Status: DISCONTINUED | OUTPATIENT
Start: 2024-09-04 | End: 2024-09-06 | Stop reason: HOSPADM

## 2024-09-04 RX ORDER — OXYCODONE HYDROCHLORIDE 5 MG/1
10 TABLET ORAL EVERY 8 HOURS PRN
Status: DISCONTINUED | OUTPATIENT
Start: 2024-09-04 | End: 2024-09-06 | Stop reason: HOSPADM

## 2024-09-04 RX ORDER — OXYCODONE HYDROCHLORIDE 5 MG/1
10 TABLET ORAL EVERY 8 HOURS PRN
Status: DISCONTINUED | OUTPATIENT
Start: 2024-09-04 | End: 2024-09-04

## 2024-09-04 RX ORDER — MICONAZOLE NITRATE 2 G/100G
POWDER TOPICAL 2 TIMES DAILY
Status: DISCONTINUED | OUTPATIENT
Start: 2024-09-04 | End: 2024-09-06 | Stop reason: HOSPADM

## 2024-09-04 RX ADMIN — CLOSTRIDIUM TETANI TOXOID ANTIGEN (FORMALDEHYDE INACTIVATED), CORYNEBACTERIUM DIPHTHERIAE TOXOID ANTIGEN (FORMALDEHYDE INACTIVATED), BORDETELLA PERTUSSIS TOXOID ANTIGEN (GLUTARALDEHYDE INACTIVATED), BORDETELLA PERTUSSIS FILAMENTOUS HEMAGGLUTININ ANTIGEN (FORMALDEHYDE INACTIVATED), BORDETELLA PERTUSSIS PERTACTIN ANTIGEN, AND BORDETELLA PERTUSSIS FIMBRIAE 2/3 ANTIGEN 0.5 ML: 5; 2; 2.5; 5; 3; 5 INJECTION, SUSPENSION INTRAMUSCULAR at 11:09

## 2024-09-04 RX ADMIN — VANCOMYCIN HYDROCHLORIDE 1250 MG: 1.25 INJECTION, POWDER, LYOPHILIZED, FOR SOLUTION INTRAVENOUS at 09:09

## 2024-09-04 RX ADMIN — MICONAZOLE NITRATE: 20 POWDER TOPICAL at 09:09

## 2024-09-04 RX ADMIN — OXYCODONE HYDROCHLORIDE AND ACETAMINOPHEN 1 TABLET: 7.5; 325 TABLET ORAL at 04:09

## 2024-09-04 RX ADMIN — INSULIN ASPART 2 UNITS: 100 INJECTION, SOLUTION INTRAVENOUS; SUBCUTANEOUS at 11:09

## 2024-09-04 RX ADMIN — PIPERACILLIN SODIUM AND TAZOBACTAM SODIUM 3.38 G: 3; .375 INJECTION, POWDER, LYOPHILIZED, FOR SOLUTION INTRAVENOUS at 11:09

## 2024-09-04 RX ADMIN — OXYCODONE HYDROCHLORIDE 10 MG: 10 TABLET, FILM COATED, EXTENDED RELEASE ORAL at 01:09

## 2024-09-04 RX ADMIN — GABAPENTIN 800 MG: 300 CAPSULE ORAL at 09:09

## 2024-09-04 RX ADMIN — GABAPENTIN 800 MG: 300 CAPSULE ORAL at 08:09

## 2024-09-04 RX ADMIN — MORPHINE SULFATE 4 MG: 4 INJECTION, SOLUTION INTRAMUSCULAR; INTRAVENOUS at 05:09

## 2024-09-04 RX ADMIN — GABAPENTIN 800 MG: 300 CAPSULE ORAL at 02:09

## 2024-09-04 RX ADMIN — PIPERACILLIN SODIUM AND TAZOBACTAM SODIUM 3.38 G: 3; .375 INJECTION, POWDER, LYOPHILIZED, FOR SOLUTION INTRAVENOUS at 04:09

## 2024-09-04 RX ADMIN — MORPHINE SULFATE 1 MG: 2 INJECTION, SOLUTION INTRAMUSCULAR; INTRAVENOUS at 05:09

## 2024-09-04 RX ADMIN — VANCOMYCIN HYDROCHLORIDE 1250 MG: 1.25 INJECTION, POWDER, LYOPHILIZED, FOR SOLUTION INTRAVENOUS at 08:09

## 2024-09-04 RX ADMIN — IPRATROPIUM BROMIDE AND ALBUTEROL SULFATE 3 ML: 2.5; .5 SOLUTION RESPIRATORY (INHALATION) at 07:09

## 2024-09-04 RX ADMIN — OXYCODONE HYDROCHLORIDE 10 MG: 5 TABLET ORAL at 09:09

## 2024-09-04 RX ADMIN — ONDANSETRON 4 MG: 2 INJECTION INTRAMUSCULAR; INTRAVENOUS at 06:09

## 2024-09-04 RX ADMIN — PIPERACILLIN SODIUM AND TAZOBACTAM SODIUM 3.38 G: 3; .375 INJECTION, POWDER, LYOPHILIZED, FOR SOLUTION INTRAVENOUS at 09:09

## 2024-09-04 NOTE — SUBJECTIVE & OBJECTIVE
Interval History: Seen and examined. Upset about pain regimen. Says he is in significant pain. Will adust pain regimen. Patient has agreed to toe amputation tomorrow. He is in no acute distress.    Review of Systems   Constitutional: Negative.    HENT: Negative.     Respiratory: Negative.     Cardiovascular: Negative.    Gastrointestinal: Negative.    Genitourinary: Negative.    Musculoskeletal:         Right foot swelling, toe pain   Skin:  Positive for wound.   Neurological: Negative.    Psychiatric/Behavioral: Negative.       Objective:     Vital Signs (Most Recent):  Temp: 99.2 °F (37.3 °C) (09/04/24 0735)  Pulse: 63 (09/04/24 0830)  Resp: 18 (09/04/24 0830)  BP: 113/70 (09/04/24 0735)  SpO2: 98 % (09/04/24 0830) Vital Signs (24h Range):  Temp:  [97.4 °F (36.3 °C)-99.2 °F (37.3 °C)] 99.2 °F (37.3 °C)  Pulse:  [52-83] 63  Resp:  [16-19] 18  SpO2:  [95 %-100 %] 98 %  BP: ()/(40-91) 113/70     Weight: 119.1 kg (262 lb 9.1 oz)  Body mass index is 34.64 kg/m².    Intake/Output Summary (Last 24 hours) at 9/4/2024 0913  Last data filed at 9/4/2024 0830  Gross per 24 hour   Intake 1819 ml   Output 400 ml   Net 1419 ml         Physical Exam  Constitutional:       General: He is not in acute distress.     Appearance: He is obese.   HENT:      Head: Normocephalic and atraumatic.      Mouth/Throat:      Mouth: Mucous membranes are moist.   Eyes:      Extraocular Movements: Extraocular movements intact.      Pupils: Pupils are equal, round, and reactive to light.   Cardiovascular:      Rate and Rhythm: Normal rate and regular rhythm.      Pulses: Normal pulses.      Heart sounds: Murmur heard.   Abdominal:      General: Bowel sounds are normal. There is no distension.      Palpations: Abdomen is soft.      Tenderness: There is no abdominal tenderness.   Musculoskeletal:         General: Normal range of motion.      Cervical back: Neck supple.      Right lower leg: Edema present.   Skin:     General: Skin is warm.       "Capillary Refill: Capillary refill takes less than 2 seconds.      Comments: .  Foot with edema, erythema, right 2nd toe warm to touch tender to touch, erythema and edema with purulent drainage.  2+ pulses in the right foot   Neurological:      General: No focal deficit present.      Mental Status: He is alert. Mental status is at baseline.   Psychiatric:         Mood and Affect: Mood normal.             Significant Labs: All pertinent labs within the past 24 hours have been reviewed.  A1C:   Recent Labs   Lab 09/04/24  0702   HGBA1C 7.8*     BMP:   Recent Labs   Lab 09/04/24  0702   *      K 4.9      CO2 27   BUN 25*   CREATININE 1.2   CALCIUM 8.7     CBC:   Recent Labs   Lab 09/03/24  1201 09/04/24  0702   WBC 11.25 7.71   HGB 15.0 14.3   HCT 45.6 44.5    187     Lactic Acid:   Recent Labs   Lab 09/03/24  1201   LACTATE 1.4     Magnesium: No results for input(s): "MG" in the last 48 hours.    Significant Imaging: I have reviewed all pertinent imaging results/findings within the past 24 hours.    "

## 2024-09-04 NOTE — PLAN OF CARE
09/04/24 0830   Patient Assessment/Suction   Level of Consciousness (AVPU) alert   Respiratory Effort Normal;Unlabored   Expansion/Accessory Muscles/Retractions no retractions;no use of accessory muscles   All Lung Fields Breath Sounds clear   PRE-TX-O2   Device (Oxygen Therapy) room air   SpO2 98 %   Pulse Oximetry Type Intermittent   $ Pulse Oximetry - Multiple Charge Pulse Oximetry - Multiple   Pulse 63   Resp 18   Aerosol Therapy   $ Aerosol Therapy Charges PRN treatment not required   Education   $ Education Bronchodilator;15 min

## 2024-09-04 NOTE — PROGRESS NOTES
Pharmacokinetic Assessment: IV Vancomycin     Vancomycin Day # 2    Tolu Lyles Sr. is a 54 y.o. male on antimicrobial therapy with IV Vancomycin.    Indication & Goal: Osteomyelitis - Trough 15 - 20    Patient Labs  119.1 kg (262 lb 9.1 oz)  Recent Labs   Lab 09/03/24  1201   WBC 11.25   CREATININE 1.4    Estimated Creatinine Clearance: 79.7 mL/min (based on SCr of 1.4 mg/dL).    Dialysis N/A    Plan:  - Change regimen to Vancomycin 1250 mg  IV every 12 hours.  - Vancomycin Trough due on 9/5 at 2000    Pharmacy will continue to follow and monitor vancomycin.    Please contact pharmacy at [email or web address] [phone] for questions regarding this assessment.    Thank you for this consult,  Kaci Paul, PharmD 09/04/2024 8:12 AM

## 2024-09-04 NOTE — CONSULTS
Right 2nd toe distal with bone exposed, foul odor red edema. Moist tan drainage.  Cleaned and dried and painted with betadine and covered with large bandaid. Toenails are thick and brittle, left great toe plantar callus. Saw pt with Dr. Gray.  Bilateral buttock red rash, groin and abdominal skin fold.  Instructed to clean and dry and use antifungal 2 times per day. Skin assessment complete

## 2024-09-04 NOTE — ASSESSMENT & PLAN NOTE
"Patient's FSGs are uncontrolled due to hyperglycemia on current medication regimen.  Last A1c reviewed-   Lab Results   Component Value Date    HGBA1C 7.8 (H) 09/04/2024     Most recent fingerstick glucose reviewed- No results for input(s): "POCTGLUCOSE" in the last 24 hours.  Current correctional scale  Low    Suboptimal control, will add basal/bolus regimen and monitor CBGs  Maintain anti-hyperglycemic dose as follows-   Antihyperglycemics (From admission, onward)      Start     Stop Route Frequency Ordered    09/03/24 1713  insulin aspart U-100 pen 0-5 Units         -- SubQ Before meals & nightly PRN 09/03/24 1618          Hold Oral hypoglycemics while patient is in the hospital.     Diabetic teaching needed  Social Service consulted patient reports he is homeless and has no diabetic supplies    "

## 2024-09-04 NOTE — CONSULTS
[facility]  Podiatry  Consult Note    Patient Name: Tolu Lyles Sr.  MRN: [id number]  Admission Date: 9/3/2024  Hospital Length of Stay: 1 days  Attending Physician: Olga Cruz MD  Primary Care Provider: Liana, Primary Doctor     Inpatient consult to Podiatry  Consult performed by: Ventura Gray DPM  Consult ordered by: Jessica Terrazas NP        Subjective:     History of Present Illness:  54 year old male with a past medical history of chronic back pain, COPD, lung cancer, coronary artery disease, diabetes mellitus, sleep apnea who presents to the emergency room with reports of swelling and redness to his foot. Reports that he had an infected 2nd with beginning stages of osteomyelitis, the black area fell off and now it is with increased redness and purulent drainage.  He also reports increased warmth and swelling to the foot.  Denies any fevers or chills or exacerbating or alleviating factors otherwise.  In the ER, WBC 11.25, sodium 135, sed rate 9, BUN 37 creatinine 1.4 glucose 210 CRP 2.10 lactic acid 1.4 UA 3+ leukocytes, 92 WBCs blood cultures urine culture sent.  Right foot x-ray  Osseous destruction of the distal 2nd phalanx is increased, Soft tissue swelling of the forefoot and 2nd digit.  IV Zosyn, IV vancomycin, IV morphine, tramadol given.  Admit to hospital medicine for  further medical management rule out osteomyelitis       Patient tells me that he has had recurring wound to this toe for over 5 years.  MRI performed confirms osteomyelitis of the 2nd toe.    Scheduled Meds:   enoxparin  40 mg Subcutaneous Daily    gabapentin  800 mg Oral TID    piperacillin-tazobactam (Zosyn) IV (PEDS and ADULTS) (extended infusion is not appropriate)  3.375 g Intravenous Q8H    vancomycin (VANCOCIN) IV (PEDS and ADULTS)  1,250 mg Intravenous Q12H     Continuous Infusions:  PRN Meds:  Current Facility-Administered Medications:     acetaminophen, 650 mg, Oral, Q4H PRN    albuterol-ipratropium, 3  mL, Nebulization, Q6H PRN    dextrose 50%, 12.5 g, Intravenous, PRN    dextrose 50%, 25 g, Intravenous, PRN    glucagon (human recombinant), 1 mg, Intramuscular, PRN    glucose, 16 g, Oral, PRN    glucose, 24 g, Oral, PRN    HYDROcodone-acetaminophen, 1 tablet, Oral, Q6H PRN    insulin aspart U-100, 0-5 Units, Subcutaneous, QID (AC + HS) PRN    magnesium oxide, 800 mg, Oral, PRN    magnesium oxide, 800 mg, Oral, PRN    melatonin, 9 mg, Oral, Nightly PRN    naloxone, 0.02 mg, Intravenous, PRN    ondansetron, 4 mg, Intravenous, Q6H PRN    potassium bicarbonate, 35 mEq, Oral, PRN    potassium bicarbonate, 50 mEq, Oral, PRN    potassium bicarbonate, 60 mEq, Oral, PRN    potassium, sodium phosphates, 2 packet, Oral, PRN    potassium, sodium phosphates, 2 packet, Oral, PRN    potassium, sodium phosphates, 2 packet, Oral, PRN    senna-docusate 8.6-50 mg, 1 tablet, Oral, BID PRN    sodium chloride 0.9%, 10 mL, Intravenous, Q12H PRN    sodium chloride 0.9%, 2 mL, Intravenous, Q8H PRN    Pharmacy to dose Vancomycin consult, , , Once **AND** vancomycin - pharmacy to dose, , Intravenous, pharmacy to manage frequency    Review of patient's allergies indicates:   Allergen Reactions    Codeine Swelling, Anaphylaxis, Hives and Other (See Comments)     Throat swell, rash    Metformin         Past Medical History:   Diagnosis Date    Chronic back pain     COPD (chronic obstructive pulmonary disease)     Coronary artery disease     Diabetes mellitus     Lung nodule     Sleep apnea     Unspecified injury of head, initial encounter 1975     Past Surgical History:   Procedure Laterality Date    BRONCHOSCOPY WITH FLUOROSCOPY N/A 10/25/2023    Procedure: BRONCHOSCOPY, WITH FLUOROSCOPY;  Surgeon: Alice Holman MD;  Location: [facility];  Service: Pulmonary;  Laterality: N/A;    CARDIAC SURGERY  2001    CARIDAC STENTS 7 ;  TEXARCANA       Family History       Problem Relation (Age of Onset)    Cancer Maternal Uncle    Diabetes Mother,  "Father    Heart disease Mother, Father    Hypertension Mother, Father    Stroke Mother, Father          Tobacco Use    Smoking status: Former     Current packs/day: 0.00     Average packs/day: 0.5 packs/day for 51.7 years (25.8 ttl pk-yrs)     Types: Cigarettes     Start date: 1/1/1973     Quit date: 9/2/2024    Smokeless tobacco: Never    Tobacco comments:     Smokes 10 cigarettes daily   Substance and Sexual Activity    Alcohol use: Not Currently    Drug use: Never    Sexual activity: Not Currently     Review of Systems   Skin:  Positive for color change and wound.   Neurological:  Positive for numbness.     Objective:     Vital Signs (Most Recent):  Temp: 98.3 °F (36.8 °C) (09/04/24 1105)  Pulse: (!) 56 (09/04/24 1105)  Resp: 15 (09/04/24 1105)  BP: 130/70 (09/04/24 1140)  SpO2: 98 % (09/04/24 1105) Vital Signs (24h Range):  Temp:  [97.4 °F (36.3 °C)-99.2 °F (37.3 °C)] 98.3 °F (36.8 °C)  Pulse:  [52-63] 56  Resp:  [15-19] 15  SpO2:  [95 %-100 %] 98 %  BP: ()/(40-91) 130/70     Weight: 119.1 kg (262 lb 9.1 oz)  Body mass index is 34.64 kg/m².    Foot Exam    Laboratory:  A1C:   Recent Labs   Lab 09/04/24  0702   HGBA1C 7.8*     CBC:   Recent Labs   Lab 09/04/24  0702   WBC 7.71   RBC 4.67   HGB 14.3   HCT 44.5      MCV 95   MCH 30.6   MCHC 32.1     CMP:   Recent Labs   Lab 09/03/24  1201 09/04/24  0702   * 182*   CALCIUM 9.2 8.7   ALBUMIN 4.2  --    PROT 7.1  --    * 138   K 4.5 4.9   CO2 25 27    103   BUN 37* 25*   CREATININE 1.4 1.2   ALKPHOS 57  --    ALT 7*  --    AST 9*  --    BILITOT 0.5  --      CRP:   Recent Labs   Lab 09/03/24  1209   CRP 2.10*     ESR:   Recent Labs   Lab 09/03/24  1209   SEDRATE 9     Wound Cultures: No results for input(s): "LABAERO" in the last 4320 hours.    Diagnostic Results:  I have reviewed all pertinent imaging results/findings within the past 24 hours.    US Lower Extremity Arteries Right  Narrative: CLINICAL HISTORY:  ([id number])53 y/o  " (1970) M    Pvd with ulcer;    TECHNIQUE:  (A#[id number], exam time 9/3/2024 21:24)    US LOWER EXTREMITY ARTERIES RIGHT [id number]    Doppler sonographic ultrasound of the arteries of the extremities was performed. Images obtained in grayscale and color with Doppler.    COMPARISON:  None available.    FINDINGS:  RIGHT thigh:    Scattered calcified pleural plaques are seen in the arteries of the right lower extremity.  There appear to be triphasic waveforms to the level of the distal SFA, with monophasic waveforms in the popliteal and calf (these findings suggest a relative stenosis in the distal SFA).    Common femoral artery: ( 107 cm/s, peak systolic velocity)    Profunda: ( 117 cm/s) partially visualized but with normal color flow.    (Superficial) femoral artery:    - Proximal: ( 100 cm/s)    - Mid: ( 94 cm/s)    - Distal: ( 100 cm/s)    Popliteal ( 126 cm/s)    RIGHT calf:    Anterior tibial artery: ( 50 cm/s)    Posterior tibial artery: ( 73 cm/s)    Peroneal: ( 81 cm/s)    Dorsalis pedis artery: ( 39 cm/s)  Impression: No clinically significant stenosis, or large vessel occlusion, with scattered plaque in the arteries of the right lower extremity.    Electronically signed by: Ralph Romero  Date:    09/04/2024  Time:    07:49    Narrative & Impression  EXAMINATION:  MRI FOREFOOT WO CONTRAST RT     CLINICAL HISTORY:  Osteomyelitis, foot;     TECHNIQUE:  Multiplanar multisequence imaging was performed through the right forefoot.  No IV contrast was administered     COMPARISON:  Right foot radiographs 09/03/2024     FINDINGS:  Diffuse soft tissue swelling worse over the dorsal aspect of the foot at the level of the 2nd through 5th metatarsals.  Increased T2 signal and enlargement of the plantar muscles of the foot.  Soft tissue defect at the distal 2nd toe.  Increased STIR T2 signal within the 2nd distal with associated focal T1 hypointensity.     Impression:     Findings concerning for osteomyelitis of the  tuft of the 2nd distal phalanx, forefoot cellulitis and plantar myositis.     This report was flagged in Epic as abnormal.        Electronically signed by:Lita Sterling  Date:                                            09/03/2024  Time:                                           19:11    Clinical Findings:                      See above photos.  Full-thickness ulceration distal aspect of the 2nd toe with moderate edema and erythema.  There is active purulent drainage from the toe.  Malodorous.  Bone is exposed and palpable.  Decreased protective sensation.  Pedal pulses well palpable.    Assessment/Plan:     Active Diagnoses:    Diagnosis Date Noted POA    UTI (urinary tract infection) [N39.0] 09/03/2024 Yes    Osteomyelitis of toe of right foot [M86.9] 09/03/2024 Yes    Type 2 diabetes mellitus, without long-term current use of insulin [E11.9] 11/09/2023 Yes      Problems Resolved During this Admission:       I discussed with the patient findings of acute on chronic osteomyelitis of the right 2nd toe.  We discussed different ongoing treatment options consisting of conservative treatment with wound care and prolonged antibiotics versus surgical treatment of amputation of the toe.  Patient states that given the chronic nature of the problem he would prefer to have the right 2nd toe amputated.  All risk and benefits were discussed in detail and patient is in agreement with proceeding.  Plan will be for amputation of the 2nd toe tomorrow in the operating room.  NPO after midnight.    Thank you for your consult. I will follow-up with patient. Please contact us if you have any additional questions.    Ventura Gray DPM  Podiatry  [facility]

## 2024-09-04 NOTE — CARE UPDATE
09/03/24 2310   Patient Assessment/Suction   Level of Consciousness (AVPU) alert   Respiratory Effort Normal   Expansion/Accessory Muscles/Retractions no use of accessory muscles   All Lung Fields Breath Sounds clear   PRE-TX-O2   Device (Oxygen Therapy) room air   SpO2 99 %   Pulse Oximetry Type Intermittent   $ Pulse Oximetry - Multiple Charge Pulse Oximetry - Multiple   Pulse (!) 52   Resp 19   Aerosol Therapy   $ Aerosol Therapy Charges PRN treatment not required   Education   $ Education 15 min   Tobacco Cessation Intervention   Do you use any type of tobacco product? No   $ Smoking Cessation Charges Smoking Cessation - Intermediate (Non-CTTS)   Respiratory Evaluation   $ Care Plan Tech Time 15 min   $ Respiratory Evaluation Complete   Evaluation For New Orders   Admitting Diagnosis cellulitis   Cardiac Diagnosis chf   Pulmonary Diagnosis lung ca, copd   Home Oxygen   Has Home Oxygen? No   Home Aerosol, MDI, DPI, and Other Treatments/Therapies   Home Respiratory Therapy Per Patient/Review of Chart No   Oxygen Care Plan   Oxygen Care Plan Per Protocol   Rationale No rational found   Bronchodilator Care Plan   Bronchodilator Care Plan Aerosol   Aerosol Meds w/ frequency Albuterol - Ipratropium (DUO-NEB) 0.5mg-3mg(2.5ml) 3ml Nebulizer Solution0.1-0.15mg/kg PRN   Rationale Bronchitis/COPD   Atelectasis Care Plan   Atelectasis Care Plan Other   Rationale No Rational Found   Airway Clearance Care Plan   Airway Clearance Care Plan Other   Rationale No rationale found

## 2024-09-04 NOTE — PROGRESS NOTES
[facility] Medicine  Progress Note    Patient Name: Tolu Lyles Sr.  MRN: [id number]  Patient Class: IP- Inpatient   Admission Date: 9/3/2024  Length of Stay: 1 days  Attending Physician: Olga Cruz MD  Primary Care Provider: Liana, Primary Doctor        Subjective:     Principal Problem:<principal problem not specified>        HPI:  54 year old male with a past medical history of chronic back pain, COPD, lung cancer, coronary artery disease, diabetes mellitus, sleep apnea who presents to the emergency room with reports of swelling and redness to his foot. Reports that he had an infected 2nd with beginning stages of osteomyelitis, the black area fell off and now it is with increased redness and purulent drainage.  He also reports increased warmth and swelling to the foot.  Denies any fevers or chills or exacerbating or alleviating factors otherwise.  In the ER, WBC 11.25, sodium 135, sed rate 9, BUN 37 creatinine 1.4 glucose 210 CRP 2.10 lactic acid 1.4 UA 3+ leukocytes, 92 WBCs blood cultures urine culture sent.  Right foot x-ray  Osseous destruction of the distal 2nd phalanx is increased, Soft tissue swelling of the forefoot and 2nd digit.  IV Zosyn, IV vancomycin, IV morphine, tramadol given.  Admit to hospital medicine for  further medical management rule out osteomyelitis    Overview/Hospital Course:  No notes on file    Interval History: Seen and examined. Upset about pain regimen. Says he is in significant pain. Will adust pain regimen. Patient has agreed to toe amputation tomorrow. He is in no acute distress.    Review of Systems   Constitutional: Negative.    HENT: Negative.     Respiratory: Negative.     Cardiovascular: Negative.    Gastrointestinal: Negative.    Genitourinary: Negative.    Musculoskeletal:         Right foot swelling, toe pain   Skin:  Positive for wound.   Neurological: Negative.    Psychiatric/Behavioral: Negative.       Objective:     Vital Signs (Most  Recent):  Temp: 99.2 °F (37.3 °C) (09/04/24 0735)  Pulse: 63 (09/04/24 0830)  Resp: 18 (09/04/24 0830)  BP: 113/70 (09/04/24 0735)  SpO2: 98 % (09/04/24 0830) Vital Signs (24h Range):  Temp:  [97.4 °F (36.3 °C)-99.2 °F (37.3 °C)] 99.2 °F (37.3 °C)  Pulse:  [52-83] 63  Resp:  [16-19] 18  SpO2:  [95 %-100 %] 98 %  BP: ()/(40-91) 113/70     Weight: 119.1 kg (262 lb 9.1 oz)  Body mass index is 34.64 kg/m².    Intake/Output Summary (Last 24 hours) at 9/4/2024 0913  Last data filed at 9/4/2024 0830  Gross per 24 hour   Intake 1819 ml   Output 400 ml   Net 1419 ml         Physical Exam  Constitutional:       General: He is not in acute distress.     Appearance: He is obese.   HENT:      Head: Normocephalic and atraumatic.      Mouth/Throat:      Mouth: Mucous membranes are moist.   Eyes:      Extraocular Movements: Extraocular movements intact.      Pupils: Pupils are equal, round, and reactive to light.   Cardiovascular:      Rate and Rhythm: Normal rate and regular rhythm.      Pulses: Normal pulses.      Heart sounds: Murmur heard.   Abdominal:      General: Bowel sounds are normal. There is no distension.      Palpations: Abdomen is soft.      Tenderness: There is no abdominal tenderness.   Musculoskeletal:         General: Normal range of motion.      Cervical back: Neck supple.      Right lower leg: Edema present.   Skin:     General: Skin is warm.      Capillary Refill: Capillary refill takes less than 2 seconds.      Comments: .  Foot with edema, erythema, right 2nd toe warm to touch tender to touch, erythema and edema with purulent drainage.  2+ pulses in the right foot   Neurological:      General: No focal deficit present.      Mental Status: He is alert. Mental status is at baseline.   Psychiatric:         Mood and Affect: Mood normal.             Significant Labs: All pertinent labs within the past 24 hours have been reviewed.  A1C:   Recent Labs   Lab 09/04/24  0702   HGBA1C 7.8*     BMP:   Recent Labs  "  Lab 09/04/24  0702   *      K 4.9      CO2 27   BUN 25*   CREATININE 1.2   CALCIUM 8.7     CBC:   Recent Labs   Lab 09/03/24  1201 09/04/24  0702   WBC 11.25 7.71   HGB 15.0 14.3   HCT 45.6 44.5    187     Lactic Acid:   Recent Labs   Lab 09/03/24  1201   LACTATE 1.4     Magnesium: No results for input(s): "MG" in the last 48 hours.    Significant Imaging: I have reviewed all pertinent imaging results/findings within the past 24 hours.      Assessment/Plan:      Osteomyelitis of toe of right foot  MRI findings consistent with osteo  IV Zosyn and vancomycin started  Consult  to infectious disease, Wound Care and podiatry  IV hydration, pain medication  Right foot x-ray: Osseous destruction of the distal 2nd phalanx is increased from previous exam.  2. Soft tissue swelling of the forefoot and 2nd digit.  CRP elevated    Podiatry with plans for toe amputation tomorrow. NPO at midnight    Oxycodone 10mg BID PRN for pain      UTI (urinary tract infection)  Continue IV antibiotics  Urine culture pending      Type 2 diabetes mellitus, without long-term current use of insulin  Patient's FSGs are uncontrolled due to hyperglycemia on current medication regimen.  Last A1c reviewed-   Lab Results   Component Value Date    HGBA1C 7.8 (H) 09/04/2024     Most recent fingerstick glucose reviewed- No results for input(s): "POCTGLUCOSE" in the last 24 hours.  Current correctional scale  Low    Suboptimal control, will add basal/bolus regimen and monitor CBGs  Maintain anti-hyperglycemic dose as follows-   Antihyperglycemics (From admission, onward)      Start     Stop Route Frequency Ordered    09/03/24 1713  insulin aspart U-100 pen 0-5 Units         -- SubQ Before meals & nightly PRN 09/03/24 1618          Hold Oral hypoglycemics while patient is in the hospital.     Diabetic teaching needed  Social Service consulted patient reports he is homeless and has no diabetic supplies        VTE Risk Mitigation " (From admission, onward)           Ordered     enoxaparin injection 40 mg  Daily         09/03/24 1618     IP VTE HIGH RISK PATIENT  Once         09/03/24 1618     Place sequential compression device  Until discontinued         09/03/24 1618                    Discharge Planning   SANDRA: 9/6/2024     Code Status: Full Code   Is the patient medically ready for discharge?:     Reason for patient still in hospital (select all that apply): Treatment  Discharge Plan A: Home                  Olga Cruz MD  Department of Hospital Medicine   [facility]

## 2024-09-04 NOTE — ASSESSMENT & PLAN NOTE
MRI findings consistent with osteo  IV Zosyn and vancomycin started  Consult  to infectious disease, Wound Care and podiatry  IV hydration, pain medication  Right foot x-ray: Osseous destruction of the distal 2nd phalanx is increased from previous exam.  2. Soft tissue swelling of the forefoot and 2nd digit.  CRP elevated    Podiatry with plans for toe amputation tomorrow. NPO at midnight    Oxycodone 10mg BID PRN for pain

## 2024-09-04 NOTE — CONSULTS
Infectious Disease  Consult Note//  progress note    Patient Name: Tolu Lyles Sr.   MRN: [id number]   Admission Date: 9/3/2024   Hospital Length of Stay: 1 days  Attending Physician: Olga Cruz MD   Primary Care Provider: No, Primary Doctor     Isolation Status: No active isolations       Impression     Right 2nd toe osteomyelitis   Right foot cellulitis and myositis   Fevers and hypotension   Patient is a homeless diabetic     This is a recurrent problem in the right foot for the last 7 years with multiple cycles of antibiotics   Pain and tenderness in the major problem here at this time     So far blood cultures negative   Patient is on vanco and Zosyn   MRI is positive for osteomyelitis and myositis   Patient is on room air   Patient was vomiting last week but now is feeling better     WBCs 7   Platelet count 187   Creatinine 1.2      Recommendations      We will give him a Tdap shot   Patient is willing to go for amputation   Amputation is a solution for his problem, given his homelessness and noncompliance and uncontrolled diabetes, this will really help him with disposition planning     Sugar control   CRP is 2   Hemoglobin A1c 7.8   Continue the vanco Zosyn, we will keep an eye on the creatinine   IV hydration     Interestingly, his urinalysis is showing yeast  He may need an ultrasound of the kidneys or CT scan of the abdomen.      Portions of this note dictated using EMR integrated voice recognition software, and may be subject to voice recognition errors not corrected at proofreading. Please contact writer for clarification if needed.    Subjective:     Principal Problem: <principal problem not specified>     Past Medical History:   Diagnosis Date    Chronic back pain     COPD (chronic obstructive pulmonary disease)     Coronary artery disease     Diabetes mellitus     Lung nodule     Sleep apnea     Unspecified injury of head, initial encounter 1975        Past Surgical History:   Procedure  Laterality Date    BRONCHOSCOPY WITH FLUOROSCOPY N/A 10/25/2023    Procedure: BRONCHOSCOPY, WITH FLUOROSCOPY;  Surgeon: Alice Holman MD;  Location: [facility];  Service: Pulmonary;  Laterality: N/A;    CARDIAC SURGERY  2001    CARIDAC STENTS 7 ;  TEXARCANA       Review of patient's allergies indicates:   Allergen Reactions    Codeine Swelling, Anaphylaxis, Hives and Other (See Comments)     Throat swell, rash    Metformin         Family History       Problem Relation (Age of Onset)    Cancer Maternal Uncle    Diabetes Mother, Father    Heart disease Mother, Father    Hypertension Mother, Father    Stroke Mother, Father               Social History     Socioeconomic History    Marital status:    Tobacco Use    Smoking status: Former     Current packs/day: 0.00     Average packs/day: 0.5 packs/day for 51.7 years (25.8 ttl pk-yrs)     Types: Cigarettes     Start date: 1/1/1973     Quit date: 9/2/2024    Smokeless tobacco: Never    Tobacco comments:     Smokes 10 cigarettes daily   Substance and Sexual Activity    Alcohol use: Not Currently    Drug use: Never    Sexual activity: Not Currently     Social Determinants of Health     Financial Resource Strain: Medium Risk (9/3/2024)    Overall Financial Resource Strain (CARDIA)     Difficulty of Paying Living Expenses: Somewhat hard   Food Insecurity: Food Insecurity Present (9/3/2024)    Hunger Vital Sign     Worried About Running Out of Food in the Last Year: Sometimes true     Ran Out of Food in the Last Year: Never true   Transportation Needs: Unmet Transportation Needs (9/3/2024)    TRANSPORTATION NEEDS     Transportation : Yes, it has kept me from non-medical meetings, appointments, work or from getting things that I need.   Physical Activity: Insufficiently Active (9/3/2024)    Exercise Vital Sign     Days of Exercise per Week: 7 days     Minutes of Exercise per Session: 20 min   Stress: No Stress Concern Present (9/3/2024)    [facility] of  "Occupational Health - Occupational Stress Questionnaire     Feeling of Stress : Only a little   Housing Stability: High Risk (9/3/2024)    Housing Stability Vital Sign     Unable to Pay for Housing in the Last Year: Yes     Homeless in the Last Year: No        Lines/Drains/Airways       Peripheral Intravenous Line  Duration                  Peripheral IV - Single Lumen 09/04/24 1035 20 G Left;Posterior Forearm <1 day                     Medication:  Medications Prior to Admission   Medication Sig    furosemide (LASIX) 40 MG tablet Take 40 mg by mouth once daily.    gabapentin (NEURONTIN) 400 MG capsule Take 2 capsules (800 mg total) by mouth 3 (three) times daily.    lisinopriL 10 MG tablet Take 10 mg by mouth once daily.    metFORMIN (GLUCOPHAGE) 1000 MG tablet Take 1,000 mg by mouth 2 (two) times daily with meals.    potassium chloride SA (K-DUR,KLOR-CON) 20 MEQ tablet Take 20 mEq by mouth once daily.    ALPRAZolam (XANAX) 0.25 MG tablet Take 1 tablet (0.25 mg total) by mouth 2 (two) times daily as needed for Anxiety. (Patient not taking: Reported on 9/3/2024)          Antimicrobials:  Antibiotics (From admission, onward)      Start     Stop Route Frequency Ordered    09/04/24 0900  vancomycin 1.25 g in dextrose 5% 250 mL IVPB (ready to mix)         -- IV Every 12 hours (non-standard times) 09/04/24 0808    09/03/24 1721  vancomycin - pharmacy to dose  (vancomycin IVPB (PEDS and ADULTS))        Placed in "And" Linked Group    -- IV pharmacy to manage frequency 09/03/24 1621    09/03/24 1615  piperacillin-tazobactam 3.375 g in dextrose 5 % 100 mL IVPB (ready to mix)         -- IV Every 8 hours (non-standard times) 09/03/24 1501             Antifungals (From admission, onward)      None            Antivirals (From admission, onward)      None               Review of Systems   ROS      Objective:     Vital Signs (Most Recent):  Temp: 99.2 °F (37.3 °C) (09/04/24 0735)  Pulse: 63 (09/04/24 0830)  Resp: 18 (09/04/24 " 0830)  BP: 113/70 (09/04/24 0735)  SpO2: 98 % (09/04/24 0830)  Vital Signs (24h Range):  Temp:  [97.4 °F (36.3 °C)-99.2 °F (37.3 °C)] 99.2 °F (37.3 °C)  Pulse:  [52-63] 63  Resp:  [16-19] 18  SpO2:  [95 %-100 %] 98 %  BP: ()/(40-91) 113/70      Weight:   Wt Readings from Last 1 Encounters:   09/03/24 119.1 kg (262 lb 9.1 oz)      Body mass index is Body mass index is 34.64 kg/m².     Estimated Creatinine Clearance: Estimated Creatinine Clearance: 92.9 mL/min (based on SCr of 1.2 mg/dL).     Physical Exam  Physical Exam    Patient is afebrile   He is on room air   Hemodynamically stable   Neck is supple   Lungs are clear to auscultation   S1-S2 heard   Abdomen is soft   Right foot cellulitis and right 2nd toe ulceration noted  Significant Labs: BMP:   Recent Labs   Lab 09/04/24  0702   *      K 4.9      CO2 27   BUN 25*   CREATININE 1.2   CALCIUM 8.7         Microbiology Results (last 7 days)       Procedure Component Value Units Date/Time    Urine culture [[id number]] Collected: 09/03/24 1249    Order Status: Completed Specimen: Urine Updated: 09/04/24 0813     Urine Culture, Routine No growth to date    Narrative:      Specimen Source->Urine    Blood culture #1 **CANNOT BE ORDERED STAT** [[id number]] Collected: 09/03/24 1159    Order Status: Completed Specimen: Blood from Peripheral, Hand, Left Updated: 09/03/24 1917     Blood Culture, Routine No Growth to date    Blood culture #2 **CANNOT BE ORDERED STAT** [[id number]] Collected: 09/03/24 1159    Order Status: Completed Specimen: Blood from Peripheral, Antecubital, Right Updated: 09/03/24 1917     Blood Culture, Routine No Growth to date             Significant Imaging: MRI: I have reviewed all pertinent results/findings within the past 24 hours:         BRONSON TOLENTINO MD  Infectious Disease

## 2024-09-04 NOTE — NURSING
MD. Cruz notifies about Pressure being low intermittently . Pt. Stays in Pain too frequent so henceforth takes Pain med's around the clock. As per MD Morphine can be given if pressure is low but not if pt. Is tachycardic. Recent pressure was 106/53

## 2024-09-05 ENCOUNTER — ANESTHESIA (OUTPATIENT)
Dept: SURGERY | Facility: HOSPITAL | Age: 54
End: 2024-09-05
Payer: MEDICARE

## 2024-09-05 ENCOUNTER — ANESTHESIA EVENT (OUTPATIENT)
Dept: SURGERY | Facility: HOSPITAL | Age: 54
End: 2024-09-05
Payer: MEDICARE

## 2024-09-05 LAB
ANION GAP SERPL CALC-SCNC: 4 MMOL/L (ref 8–16)
BASOPHILS # BLD AUTO: 0.06 K/UL (ref 0–0.2)
BASOPHILS NFR BLD: 0.8 % (ref 0–1.9)
BUN SERPL-MCNC: 14 MG/DL (ref 6–20)
CALCIUM SERPL-MCNC: 9.4 MG/DL (ref 8.7–10.5)
CHLORIDE SERPL-SCNC: 102 MMOL/L (ref 95–110)
CO2 SERPL-SCNC: 31 MMOL/L (ref 23–29)
CREAT SERPL-MCNC: 1.1 MG/DL (ref 0.5–1.4)
DIFFERENTIAL METHOD BLD: ABNORMAL
EOSINOPHIL # BLD AUTO: 0.3 K/UL (ref 0–0.5)
EOSINOPHIL NFR BLD: 3.4 % (ref 0–8)
ERYTHROCYTE [DISTWIDTH] IN BLOOD BY AUTOMATED COUNT: 14.6 % (ref 11.5–14.5)
EST. GFR  (NO RACE VARIABLE): >60 ML/MIN/1.73 M^2
GLUCOSE SERPL-MCNC: 142 MG/DL (ref 70–110)
HCT VFR BLD AUTO: 44.8 % (ref 40–54)
HGB BLD-MCNC: 14.1 G/DL (ref 14–18)
IMM GRANULOCYTES # BLD AUTO: 0.05 K/UL (ref 0–0.04)
IMM GRANULOCYTES NFR BLD AUTO: 0.7 % (ref 0–0.5)
LYMPHOCYTES # BLD AUTO: 2 K/UL (ref 1–4.8)
LYMPHOCYTES NFR BLD: 26.3 % (ref 18–48)
MCH RBC QN AUTO: 31 PG (ref 27–31)
MCHC RBC AUTO-ENTMCNC: 31.5 G/DL (ref 32–36)
MCV RBC AUTO: 99 FL (ref 82–98)
MONOCYTES # BLD AUTO: 0.6 K/UL (ref 0.3–1)
MONOCYTES NFR BLD: 7.5 % (ref 4–15)
NEUTROPHILS # BLD AUTO: 4.6 K/UL (ref 1.8–7.7)
NEUTROPHILS NFR BLD: 61.3 % (ref 38–73)
NRBC BLD-RTO: 0 /100 WBC
PLATELET # BLD AUTO: 155 K/UL (ref 150–450)
PMV BLD AUTO: 11.3 FL (ref 9.2–12.9)
POCT GLUCOSE: 161 MG/DL (ref 70–110)
POCT GLUCOSE: 162 MG/DL (ref 70–110)
POCT GLUCOSE: 192 MG/DL (ref 70–110)
POCT GLUCOSE: 236 MG/DL (ref 70–110)
POTASSIUM SERPL-SCNC: 4.6 MMOL/L (ref 3.5–5.1)
RBC # BLD AUTO: 4.55 M/UL (ref 4.6–6.2)
SODIUM SERPL-SCNC: 137 MMOL/L (ref 136–145)
WBC # BLD AUTO: 7.44 K/UL (ref 3.9–12.7)

## 2024-09-05 PROCEDURE — 63600175 PHARM REV CODE 636 W HCPCS

## 2024-09-05 PROCEDURE — 27202103: Performed by: ANESTHESIOLOGY

## 2024-09-05 PROCEDURE — 99900031 HC PATIENT EDUCATION (STAT)

## 2024-09-05 PROCEDURE — 71000039 HC RECOVERY, EACH ADD'L HOUR: Performed by: PODIATRIST

## 2024-09-05 PROCEDURE — 28820 AMPUTATION OF TOE: CPT | Mod: T6,,, | Performed by: PODIATRIST

## 2024-09-05 PROCEDURE — 87206 SMEAR FLUORESCENT/ACID STAI: CPT | Performed by: PODIATRIST

## 2024-09-05 PROCEDURE — 25000003 PHARM REV CODE 250

## 2024-09-05 PROCEDURE — 87077 CULTURE AEROBIC IDENTIFY: CPT | Performed by: PODIATRIST

## 2024-09-05 PROCEDURE — 99900035 HC TECH TIME PER 15 MIN (STAT)

## 2024-09-05 PROCEDURE — 94640 AIRWAY INHALATION TREATMENT: CPT

## 2024-09-05 PROCEDURE — 63600175 PHARM REV CODE 636 W HCPCS: Performed by: PODIATRIST

## 2024-09-05 PROCEDURE — 25000003 PHARM REV CODE 250: Performed by: EMERGENCY MEDICINE

## 2024-09-05 PROCEDURE — 27202107 HC XP QUATRO SENSOR: Performed by: ANESTHESIOLOGY

## 2024-09-05 PROCEDURE — 63600175 PHARM REV CODE 636 W HCPCS: Performed by: ANESTHESIOLOGY

## 2024-09-05 PROCEDURE — 0Y6R0Z0 DETACHMENT AT RIGHT 2ND TOE, COMPLETE, OPEN APPROACH: ICD-10-PCS | Performed by: PODIATRIST

## 2024-09-05 PROCEDURE — 27000673 HC TUBING BLOOD Y: Performed by: ANESTHESIOLOGY

## 2024-09-05 PROCEDURE — 25000242 PHARM REV CODE 250 ALT 637 W/ HCPCS: Performed by: NURSE PRACTITIONER

## 2024-09-05 PROCEDURE — C9290 INJ, BUPIVACAINE LIPOSOME: HCPCS | Performed by: PODIATRIST

## 2024-09-05 PROCEDURE — 37000009 HC ANESTHESIA EA ADD 15 MINS: Performed by: PODIATRIST

## 2024-09-05 PROCEDURE — 87205 SMEAR GRAM STAIN: CPT | Performed by: PODIATRIST

## 2024-09-05 PROCEDURE — 63600175 PHARM REV CODE 636 W HCPCS: Performed by: EMERGENCY MEDICINE

## 2024-09-05 PROCEDURE — 87116 MYCOBACTERIA CULTURE: CPT | Performed by: PODIATRIST

## 2024-09-05 PROCEDURE — 87075 CULTR BACTERIA EXCEPT BLOOD: CPT | Performed by: PODIATRIST

## 2024-09-05 PROCEDURE — 87070 CULTURE OTHR SPECIMN AEROBIC: CPT | Performed by: PODIATRIST

## 2024-09-05 PROCEDURE — 25000003 PHARM REV CODE 250: Performed by: NURSE PRACTITIONER

## 2024-09-05 PROCEDURE — 94761 N-INVAS EAR/PLS OXIMETRY MLT: CPT

## 2024-09-05 PROCEDURE — 12000002 HC ACUTE/MED SURGE SEMI-PRIVATE ROOM

## 2024-09-05 PROCEDURE — 85025 COMPLETE CBC W/AUTO DIFF WBC: CPT | Performed by: NURSE PRACTITIONER

## 2024-09-05 PROCEDURE — 87147 CULTURE TYPE IMMUNOLOGIC: CPT | Performed by: PODIATRIST

## 2024-09-05 PROCEDURE — 87102 FUNGUS ISOLATION CULTURE: CPT | Performed by: PODIATRIST

## 2024-09-05 PROCEDURE — 36415 COLL VENOUS BLD VENIPUNCTURE: CPT | Performed by: NURSE PRACTITIONER

## 2024-09-05 PROCEDURE — 80048 BASIC METABOLIC PNL TOTAL CA: CPT | Performed by: NURSE PRACTITIONER

## 2024-09-05 PROCEDURE — 36000707: Performed by: PODIATRIST

## 2024-09-05 PROCEDURE — 37000008 HC ANESTHESIA 1ST 15 MINUTES: Performed by: PODIATRIST

## 2024-09-05 PROCEDURE — 87186 SC STD MICRODIL/AGAR DIL: CPT | Performed by: PODIATRIST

## 2024-09-05 PROCEDURE — 36000706: Performed by: PODIATRIST

## 2024-09-05 PROCEDURE — 71000033 HC RECOVERY, INTIAL HOUR: Performed by: PODIATRIST

## 2024-09-05 PROCEDURE — 27200651 HC AIRWAY, LMA: Performed by: ANESTHESIOLOGY

## 2024-09-05 RX ORDER — ONDANSETRON 4 MG/1
8 TABLET, ORALLY DISINTEGRATING ORAL EVERY 8 HOURS PRN
Status: CANCELLED | OUTPATIENT
Start: 2024-09-05

## 2024-09-05 RX ORDER — ONDANSETRON HYDROCHLORIDE 2 MG/ML
4 INJECTION, SOLUTION INTRAVENOUS DAILY PRN
Status: DISCONTINUED | OUTPATIENT
Start: 2024-09-05 | End: 2024-09-05 | Stop reason: HOSPADM

## 2024-09-05 RX ORDER — PROMETHAZINE HYDROCHLORIDE 25 MG/1
25 TABLET ORAL EVERY 6 HOURS PRN
Status: CANCELLED | OUTPATIENT
Start: 2024-09-05

## 2024-09-05 RX ORDER — HYDROCODONE BITARTRATE AND ACETAMINOPHEN 5; 325 MG/1; MG/1
1 TABLET ORAL EVERY 4 HOURS PRN
Status: CANCELLED | OUTPATIENT
Start: 2024-09-05

## 2024-09-05 RX ORDER — ACETAMINOPHEN 10 MG/ML
INJECTION, SOLUTION INTRAVENOUS
Status: DISCONTINUED | OUTPATIENT
Start: 2024-09-05 | End: 2024-09-05

## 2024-09-05 RX ORDER — PROPOFOL 10 MG/ML
INJECTION, EMULSION INTRAVENOUS
Status: DISCONTINUED | OUTPATIENT
Start: 2024-09-05 | End: 2024-09-05

## 2024-09-05 RX ORDER — GLUCAGON 1 MG
1 KIT INJECTION
Status: DISCONTINUED | OUTPATIENT
Start: 2024-09-05 | End: 2024-09-05 | Stop reason: HOSPADM

## 2024-09-05 RX ORDER — HYDROCODONE BITARTRATE AND ACETAMINOPHEN 10; 325 MG/1; MG/1
1 TABLET ORAL EVERY 4 HOURS PRN
Status: CANCELLED | OUTPATIENT
Start: 2024-09-05

## 2024-09-05 RX ORDER — MIDAZOLAM HYDROCHLORIDE 1 MG/ML
INJECTION INTRAMUSCULAR; INTRAVENOUS
Status: DISCONTINUED | OUTPATIENT
Start: 2024-09-05 | End: 2024-09-05

## 2024-09-05 RX ORDER — ONDANSETRON HYDROCHLORIDE 2 MG/ML
INJECTION, SOLUTION INTRAVENOUS
Status: DISCONTINUED | OUTPATIENT
Start: 2024-09-05 | End: 2024-09-05

## 2024-09-05 RX ORDER — FENTANYL CITRATE 50 UG/ML
INJECTION, SOLUTION INTRAMUSCULAR; INTRAVENOUS
Status: DISCONTINUED | OUTPATIENT
Start: 2024-09-05 | End: 2024-09-05

## 2024-09-05 RX ORDER — LIDOCAINE HYDROCHLORIDE 20 MG/ML
INJECTION INTRAVENOUS
Status: DISCONTINUED | OUTPATIENT
Start: 2024-09-05 | End: 2024-09-05

## 2024-09-05 RX ORDER — BUPIVACAINE HYDROCHLORIDE 5 MG/ML
INJECTION, SOLUTION EPIDURAL; INTRACAUDAL
Status: DISCONTINUED | OUTPATIENT
Start: 2024-09-05 | End: 2024-09-05 | Stop reason: HOSPADM

## 2024-09-05 RX ORDER — OXYCODONE HYDROCHLORIDE 5 MG/1
5 TABLET ORAL
Status: DISCONTINUED | OUTPATIENT
Start: 2024-09-05 | End: 2024-09-05 | Stop reason: HOSPADM

## 2024-09-05 RX ORDER — PHENYLEPHRINE HYDROCHLORIDE 10 MG/ML
INJECTION INTRAVENOUS
Status: DISCONTINUED | OUTPATIENT
Start: 2024-09-05 | End: 2024-09-05

## 2024-09-05 RX ORDER — SODIUM CHLORIDE, SODIUM LACTATE, POTASSIUM CHLORIDE, CALCIUM CHLORIDE 600; 310; 30; 20 MG/100ML; MG/100ML; MG/100ML; MG/100ML
INJECTION, SOLUTION INTRAVENOUS CONTINUOUS PRN
Status: DISCONTINUED | OUTPATIENT
Start: 2024-09-05 | End: 2024-09-05

## 2024-09-05 RX ORDER — DIPHENHYDRAMINE HYDROCHLORIDE 50 MG/ML
12.5 INJECTION INTRAMUSCULAR; INTRAVENOUS
Status: DISCONTINUED | OUTPATIENT
Start: 2024-09-05 | End: 2024-09-05 | Stop reason: HOSPADM

## 2024-09-05 RX ORDER — FAMOTIDINE 10 MG/ML
INJECTION INTRAVENOUS
Status: DISCONTINUED | OUTPATIENT
Start: 2024-09-05 | End: 2024-09-05

## 2024-09-05 RX ORDER — FENTANYL CITRATE 50 UG/ML
25 INJECTION, SOLUTION INTRAMUSCULAR; INTRAVENOUS EVERY 5 MIN PRN
Status: DISCONTINUED | OUTPATIENT
Start: 2024-09-05 | End: 2024-09-05 | Stop reason: HOSPADM

## 2024-09-05 RX ORDER — DIPHENHYDRAMINE HYDROCHLORIDE 50 MG/ML
INJECTION INTRAMUSCULAR; INTRAVENOUS
Status: DISCONTINUED | OUTPATIENT
Start: 2024-09-05 | End: 2024-09-05

## 2024-09-05 RX ORDER — TRAMADOL HYDROCHLORIDE 50 MG/1
50 TABLET ORAL EVERY 4 HOURS PRN
Status: DISCONTINUED | OUTPATIENT
Start: 2024-09-05 | End: 2024-09-06 | Stop reason: HOSPADM

## 2024-09-05 RX ORDER — GLYCOPYRROLATE 0.2 MG/ML
0.3 INJECTION INTRAMUSCULAR; INTRAVENOUS ONCE
Status: COMPLETED | OUTPATIENT
Start: 2024-09-05 | End: 2024-09-05

## 2024-09-05 RX ADMIN — INSULIN GLARGINE 8 UNITS: 100 INJECTION, SOLUTION SUBCUTANEOUS at 09:09

## 2024-09-05 RX ADMIN — IPRATROPIUM BROMIDE AND ALBUTEROL SULFATE 3 ML: 2.5; .5 SOLUTION RESPIRATORY (INHALATION) at 08:09

## 2024-09-05 RX ADMIN — LIDOCAINE HYDROCHLORIDE 50 MG: 20 INJECTION, SOLUTION INTRAVENOUS at 10:09

## 2024-09-05 RX ADMIN — PHENYLEPHRINE HYDROCHLORIDE 50 MCG: 10 INJECTION INTRAVENOUS at 10:09

## 2024-09-05 RX ADMIN — GABAPENTIN 800 MG: 300 CAPSULE ORAL at 01:09

## 2024-09-05 RX ADMIN — PIPERACILLIN SODIUM AND TAZOBACTAM SODIUM 3.38 G: 3; .375 INJECTION, POWDER, LYOPHILIZED, FOR SOLUTION INTRAVENOUS at 03:09

## 2024-09-05 RX ADMIN — GLYCOPYRROLATE 0.3 MG: 0.2 INJECTION INTRAMUSCULAR; INTRAVENOUS at 11:09

## 2024-09-05 RX ADMIN — MORPHINE SULFATE 4 MG: 4 INJECTION, SOLUTION INTRAMUSCULAR; INTRAVENOUS at 12:09

## 2024-09-05 RX ADMIN — MORPHINE SULFATE 4 MG: 4 INJECTION, SOLUTION INTRAMUSCULAR; INTRAVENOUS at 06:09

## 2024-09-05 RX ADMIN — PHENYLEPHRINE HYDROCHLORIDE 100 MCG: 10 INJECTION INTRAVENOUS at 10:09

## 2024-09-05 RX ADMIN — GLYCOPYRROLATE 0.2 MG: 0.2 INJECTION, SOLUTION INTRAMUSCULAR; INTRAVITREAL at 10:09

## 2024-09-05 RX ADMIN — OXYCODONE HYDROCHLORIDE 10 MG: 5 TABLET ORAL at 05:09

## 2024-09-05 RX ADMIN — PROPOFOL 200 MG: 10 INJECTION, EMULSION INTRAVENOUS at 10:09

## 2024-09-05 RX ADMIN — SODIUM CHLORIDE, SODIUM LACTATE, POTASSIUM CHLORIDE, AND CALCIUM CHLORIDE: .6; .31; .03; .02 INJECTION, SOLUTION INTRAVENOUS at 10:09

## 2024-09-05 RX ADMIN — FENTANYL CITRATE 25 MCG: 50 INJECTION, SOLUTION INTRAMUSCULAR; INTRAVENOUS at 10:09

## 2024-09-05 RX ADMIN — GABAPENTIN 800 MG: 300 CAPSULE ORAL at 09:09

## 2024-09-05 RX ADMIN — ACETAMINOPHEN 1000 MG: 10 INJECTION, SOLUTION INTRAVENOUS at 10:09

## 2024-09-05 RX ADMIN — FAMOTIDINE 20 MG: 10 INJECTION, SOLUTION INTRAVENOUS at 10:09

## 2024-09-05 RX ADMIN — DIPHENHYDRAMINE HYDROCHLORIDE 6.25 MG: 50 INJECTION INTRAMUSCULAR; INTRAVENOUS at 10:09

## 2024-09-05 RX ADMIN — VANCOMYCIN HYDROCHLORIDE 1250 MG: 1.25 INJECTION, POWDER, LYOPHILIZED, FOR SOLUTION INTRAVENOUS at 08:09

## 2024-09-05 RX ADMIN — MORPHINE SULFATE 4 MG: 4 INJECTION, SOLUTION INTRAMUSCULAR; INTRAVENOUS at 09:09

## 2024-09-05 RX ADMIN — MIDAZOLAM HYDROCHLORIDE 2 MG: 1 INJECTION, SOLUTION INTRAMUSCULAR; INTRAVENOUS at 10:09

## 2024-09-05 RX ADMIN — MICONAZOLE NITRATE: 20 POWDER TOPICAL at 09:09

## 2024-09-05 RX ADMIN — ONDANSETRON 4 MG: 2 INJECTION INTRAMUSCULAR; INTRAVENOUS at 10:09

## 2024-09-05 RX ADMIN — INSULIN ASPART 5 UNITS: 100 INJECTION, SOLUTION INTRAVENOUS; SUBCUTANEOUS at 05:09

## 2024-09-05 RX ADMIN — OXYCODONE HYDROCHLORIDE 10 MG: 5 TABLET ORAL at 09:09

## 2024-09-05 NOTE — CARE UPDATE
09/04/24 1910   Patient Assessment/Suction   Level of Consciousness (AVPU) alert   Respiratory Effort Normal;Unlabored   Expansion/Accessory Muscles/Retractions no use of accessory muscles;no retractions   ALBERTO Breath Sounds clear   LLL Breath Sounds diminished   RUL Breath Sounds clear   RML Breath Sounds clear   RLL Breath Sounds diminished   Rhythm/Pattern, Respiratory pattern regular;depth regular;no shortness of breath reported   Cough Frequency infrequent   Cough Type good;loose;nonproductive   PRE-TX-O2   Device (Oxygen Therapy) room air   SpO2 97 %   Pulse Oximetry Type Intermittent   $ Pulse Oximetry - Multiple Charge Pulse Oximetry - Multiple   Pulse (!) 57   Resp 18   Aerosol Therapy   $ Aerosol Therapy Charges Aerosol Treatment   Daily Review of Necessity (SVN) completed   Respiratory Treatment Status (SVN) given   Treatment Route (SVN) mouthpiece utilized   Patient Position HOB elevated   Post Treatment Assessment (SVN) increased aeration   Signs of Intolerance (SVN) none   Education   $ Education Bronchodilator;15 min

## 2024-09-05 NOTE — TRANSFER OF CARE
"Anesthesia Transfer of Care Note    Patient: Tolu Lyles SrHe    Procedure(s) Performed: Procedure(s) (LRB):  AMPUTATION, TOE (Right)    Patient location: PACU    Anesthesia Type: general    Transport from OR: Transported from OR on room air with adequate spontaneous ventilation    Post pain: adequate analgesia    Post assessment: no apparent anesthetic complications and tolerated procedure well    Post vital signs: stable    Level of consciousness: awake and responds to stimulation    Nausea/Vomiting: no nausea/vomiting    Complications: none    Transfer of care protocol was followed      Last vitals: Visit Vitals  BP (!) 106/52   Pulse (!) 59   Temp 36.8 °C (98.2 °F) (Oral)   Resp 18   Ht 6' 1" (1.854 m)   Wt 119.1 kg (262 lb 9.1 oz)   SpO2 98%   BMI 34.64 kg/m²     "

## 2024-09-05 NOTE — OP NOTE
Operative Report     Patient name: Tolu Lyles Sr.   MRN: [id number]  Date of surgery: 9/5/2024    Surgeon: Ventura Gray DPM   Assistant:  None    Preoperative diagnosis:  Osteomyelitis 2nd toe right foot  Postoperative diagnosis:  Same as above  Procedure:  Amputation 2nd toe right foot  Anesthesia:  General  Hemostasis:  Pneumatic ankle tourniquet at 250 mmHg  Estimated blood loss:  2 mL   Specimen:  Swab culture from right 2nd toe  Complications: None  Condition upon discharge: Stable    Procedure in detail:  Patient was brought the operating room and placed on the operating table in a supine position.  Following induction general anesthesia a well-padded pneumatic ankle tourniquet was placed around the patient's right ankle and set at 250 mmHg.  The right foot was then prepped scrubbed and draped in normal aseptic manner.  Time-out was then called.  An Esmarch bandage was then utilized to exsanguinated the right foot and the right pneumatic ankle tourniquet was inflated to 250 mmHg.  At this time attention was directed the patient's right foot where a swab culture was taken of the drainage emanating from the distal aspect of the toe.  A 15 blade was utilized to make a racquet type incision about the base of the toe and dissection was carried sharply down to the metatarsophalangeal joint.  The toe was sharply disarticulated and passed from the operating field.  Bleeders were cauterized as necessary.  The wound was flushed copious amounts of sterile saline.  The subcutaneous tissues were closed utilizing 4-0 Vicryl.  The skin was closed utilizing 3-0 Prolene in a continuous running fashion.  20 cc of 0.5% Marcaine plain and Exparel was utilized in a block of the surgical site.  The patient's foot was then dressed with Xeroform 4x4s Kerlix and an Ace wrap.  The pneumatic ankle tourniquet was deflated and neurovascular status was noted to be intact to the right foot and digits.  Patient's right foot was placed in a  postoperative Cam Walker boot.  Patient tolerated the procedure well.  He had anesthesia reversed and left the operating Room stable vitals.    Patient will return to his room on the floor.  He is okay for limited weight-bearing in the Cam Walker boot.

## 2024-09-05 NOTE — CARE UPDATE
09/05/24 0802   Patient Assessment/Suction   Level of Consciousness (AVPU) alert   Respiratory Effort Normal;Unlabored   Expansion/Accessory Muscles/Retractions no use of accessory muscles   Rhythm/Pattern, Respiratory no shortness of breath reported   Cough Frequency infrequent   Cough Type good;nonproductive   PRE-TX-O2   Device (Oxygen Therapy) room air   SpO2 98 %   Pulse Oximetry Type Intermittent   $ Pulse Oximetry - Multiple Charge Pulse Oximetry - Multiple   Aerosol Therapy   $ Aerosol Therapy Charges PRN treatment not required   Respiratory Treatment Status (SVN) PRN treatment not required   Education   $ Education Bronchodilator;15 min

## 2024-09-05 NOTE — ANESTHESIA POSTPROCEDURE EVALUATION
Anesthesia Post Evaluation    Patient: Tolu Lyles SrHe    Procedure(s) Performed: Procedure(s) (LRB):  AMPUTATION, TOE (Right)    Final Anesthesia Type: general      Patient location during evaluation: PACU  Patient participation: Yes- Able to Participate  Level of consciousness: awake and alert, oriented and awake  Post-procedure vital signs: reviewed and stable  Pain management: adequate  Airway patency: patent  COLEMAN mitigation strategies: Multimodal analgesia, Extubation while patient is awake, Verification of full reversal of neuromuscular block and Extubation and recovery carried out in lateral, semiupright, or other nonsupine position  PONV status at discharge: No PONV  Anesthetic complications: no      Cardiovascular status: blood pressure returned to baseline, hemodynamically stable and stable  Respiratory status: unassisted, spontaneous ventilation and room air  Hydration status: euvolemic  Follow-up not needed.              Vitals Value Taken Time   /65 09/05/24 1201   Temp 36.5 °C (97.7 °F) 09/05/24 1145   Pulse 64 09/05/24 1208   Resp 16 09/05/24 1208   SpO2 95 % 09/05/24 1208   Vitals shown include unfiled device data.      Event Time   Out of Recovery 09/05/2024 12:10:29         Pain/Mellisa Score: Pain Rating Prior to Med Admin: 10 (9/5/2024  9:09 AM)  Pain Rating Post Med Admin: 0 (9/5/2024 12:52 AM)  Mellisa Score: 10 (9/5/2024 12:00 PM)

## 2024-09-05 NOTE — ANESTHESIA PROCEDURE NOTES
Intubation    Date/Time: 9/5/2024 10:31 AM    Performed by: Patricio Yen CRNA  Authorized by: Murtaza Dean MD    Intubation:     Induction:  Intravenous    Intubated:  Postinduction    Mask Ventilation:  Not attempted    Attempted By:  CRNA    Difficult Airway Encountered?: No      Complications:  None    Airway Device:  Supraglottic airway/LMA    Airway Device Size:  5.0    Placement Verified By:  Capnometry    Complicating Factors:  None    Findings Post-Intubation:  BS equal bilateral

## 2024-09-05 NOTE — ANESTHESIA PREPROCEDURE EVALUATION
09/05/2024  Tolu Lyles Sr. is a 54 y.o., male.      Results for orders placed or performed during the hospital encounter of 03/30/24   EKG 12-lead    Collection Time: 03/30/24 11:20 PM   Result Value Ref Range    QRS Duration 92 ms    OHS QTC Calculation 412 ms    Narrative    Test Reason : R07.9,    Vent. Rate : 067 BPM     Atrial Rate : 067 BPM     P-R Int : 138 ms          QRS Dur : 092 ms      QT Int : 390 ms       P-R-T Axes : 028 088 081 degrees     QTc Int : 412 ms    Normal sinus rhythm  Low voltage QRS  Septal infarct ,age undetermined  Abnormal ECG  When compared with ECG of 09-NOV-2023 11:03,  Septal infarct is now Present  T wave inversion now evident in Anterior leads  Confirmed by Rebecca ESCALANTE, Alvin KRAUSE ([id number]) on 4/21/2024 1:57:45 PM    Referred By: [email or web address]   SELF           Confirmed By:Alvin Fernandez MD        Imaging Results               MRI Forefoot WO Contrast RT (Final result)  Result time 09/03/24 19:11:44      Final result by Lita Sterling MD (09/03/24 19:11:44)                   Impression:      Findings concerning for osteomyelitis of the tuft of the 2nd distal phalanx, forefoot cellulitis and plantar myositis.    This report was flagged in Epic as abnormal.      Electronically signed by: Lita Sterling  Date:    09/03/2024  Time:    19:11               Narrative:    EXAMINATION:  MRI FOREFOOT WO CONTRAST RT    CLINICAL HISTORY:  Osteomyelitis, foot;    TECHNIQUE:  Multiplanar multisequence imaging was performed through the right forefoot.  No IV contrast was administered    COMPARISON:  Right foot radiographs 09/03/2024    FINDINGS:  Diffuse soft tissue swelling worse over the dorsal aspect of the foot at the level of the 2nd through 5th metatarsals.  Increased T2 signal and enlargement of the plantar muscles of the foot.  Soft tissue defect at the distal 2nd toe.  Increased  STIR T2 signal within the 2nd distal with associated focal T1 hypointensity.                                       X-Ray Foot Complete Right (Final result)  Result time 09/03/24 11:49:17      Final result by Abel Boyd DO (09/03/24 11:49:17)                   Impression:      1. Osseous destruction of the distal 2nd phalanx is increased from previous exam.  2. Soft tissue swelling of the forefoot and 2nd digit.      Electronically signed by: Abel Boyd  Date:    09/03/2024  Time:    11:49               Narrative:    EXAMINATION:  XR FOOT COMPLETE 3 VIEW RIGHT    CLINICAL HISTORY:  Pain in unspecified toe(s)    FINDINGS:  Three views of the right foot with comparison radiograph 08/28/2024. There is osseous destruction of the distal 2nd phalanx, increased from previous exam.  No other definite destructive osseous lesion observed.  There is soft tissue swelling of the 2nd digit.  Soft tissue swelling of the forefoot.  No radiopaque foreign bodies.                                       Lab Results   Component Value Date    WBC 7.44 09/05/2024    HGB 14.1 09/05/2024    HCT 44.8 09/05/2024    MCV 99 (H) 09/05/2024     09/05/2024     BMP  Lab Results   Component Value Date     09/04/2024    K 4.9 09/04/2024     09/04/2024    CO2 27 09/04/2024    BUN 25 (H) 09/04/2024    CREATININE 1.2 09/04/2024    CALCIUM 8.7 09/04/2024    ANIONGAP 8 09/04/2024     (H) 09/04/2024     (H) 09/03/2024     (H) 08/28/2024       No results found for this or any previous visit.              Pre-op Assessment    I have reviewed the Patient Summary Reports.     I have reviewed the Nursing Notes. I have reviewed the NPO Status.   I have reviewed the Medications.     Review of Systems  Anesthesia Hx:  No problems with previous Anesthesia   History of prior surgery of interest to airway management or planning: heart surgery.         Denies Family Hx of Anesthesia complications.    Denies Personal Hx  of Anesthesia complications.                    Social:  Smoker, No Alcohol Use       Hematology/Oncology:  Hematology Normal                     Current/Recent Cancer.            Oncology Comments: Lung Cancer     EENT/Dental:  EENT/Dental Normal           Cardiovascular:        CAD  asymptomatic CABG/stent (hx of stents place 15 y ago and again 10 y ago, no records)           ECG has been reviewed. Patient not followed by Cardiologist at this time   Patient with 9 Stents with last placed 4/2024 - no angina since                          Pulmonary:   COPD, mild     Sleep Apnea Lung nodule  QOD inhaler use (with exertion)  No Nebulizer use or Home Oxygen use   Patient without C PAP use post 200 lb wt loss  Last inhaler use 1 month ago             Education provided regarding risk of obstructive sleep apnea            Renal/:     Hx CESIA              Hepatic/GI:     GERD, well controlled             Musculoskeletal:     Bilateral sciatic nerve pain   Osteomyelitis of toe of right foot       Spine Disorders: (chronic intermittent low back pain with occ bilat leg symptoms) lumbar and cervical Degenerative disease and Chronic Pain           Neurological:    Neuromuscular Disease, (bilat sciatica symptoms)                                   Endocrine:  Diabetes, poorly controlled, type 2, using insulin         Obesity / BMI > 30  Dermatological:  Skin Normal    Psych:  Psychiatric Normal                    Physical Exam  General: Well nourished, Cooperative, Alert and Oriented    Airway:  Mallampati: III / II  Mouth Opening: Normal  TM Distance: Normal  Tongue: Large  Neck ROM: Normal ROM    Dental:  Caps / Implants, Intact    Chest/Lungs:  Normal Respiratory Rate  inspiratory wheezes    Heart:  Rate: Normal  Rhythm: Regular Rhythm  Sounds: Normal    Abdomen:  Normal, Soft, Nontender        Anesthesia Plan  Type of Anesthesia, risks & benefits discussed:    Anesthesia Type: Gen Supraglottic Airway, Gen ETT  Intra-op  Monitoring Plan: Standard ASA Monitors  Post Op Pain Control Plan:   (medical reason for not using multimodal pain management)  Induction:  IV  Informed Consent: Informed consent signed with the Patient and all parties understand the risks and agree with anesthesia plan.  All questions answered. Patient consented to blood products? No  ASA Score: 3 Emergent  Anesthesia Plan Notes:     LMA Okay   No Decadron   Benadryl 6.25mg iv, Zofran 4mg iv, Pepcid 20mg iv   Ofirmev 1000mg iv  Sugammadex if needed  Note: Oxycodone 10 mg po @ 0909  COLEMAN Precautions: Remove LMA wiwth patient awake and HOB elevated     Ready For Surgery From Anesthesia Perspective.     .

## 2024-09-05 NOTE — PROGRESS NOTES
[facility]  Progress Note    Patient Name: Tolu Lyles Sr.  MRN: [id number]  Patient Class: IP- Inpatient   Admission Date: 9/3/2024  Length of Stay: 2 days  Attending Physician: Olga Cruz MD  Primary Care Provider: Liana, Primary Doctor        Subjective:     Principal Problem:Osteomyelitis of toe of right foot        HPI:  54 year old male with a past medical history of chronic back pain, COPD, lung cancer, coronary artery disease, diabetes mellitus, sleep apnea who presents to the emergency room with reports of swelling and redness to his foot. Reports that he had an infected 2nd with beginning stages of osteomyelitis, the black area fell off and now it is with increased redness and purulent drainage.  He also reports increased warmth and swelling to the foot.  Denies any fevers or chills or exacerbating or alleviating factors otherwise.  In the ER, WBC 11.25, sodium 135, sed rate 9, BUN 37 creatinine 1.4 glucose 210 CRP 2.10 lactic acid 1.4 UA 3+ leukocytes, 92 WBCs blood cultures urine culture sent.  Right foot x-ray  Osseous destruction of the distal 2nd phalanx is increased, Soft tissue swelling of the forefoot and 2nd digit.  IV Zosyn, IV vancomycin, IV morphine, tramadol given.  Admit to hospital medicine for  further medical management rule out osteomyelitis    Overview/Hospital Course:  No notes on file    Interval History: Patient seen post procedure doing well, some blood noted on his bandage and reports some aching as to be expected but reports feeling better. Denies subjective fever, chills, chest pain or dyspnea.    Review of Systems   Constitutional: Negative.    HENT: Negative.     Respiratory: Negative.     Cardiovascular: Negative.    Gastrointestinal: Negative.    Genitourinary: Negative.    Musculoskeletal:         Pain at amputation site   Skin:  Positive for wound.   Neurological: Negative.    Psychiatric/Behavioral: Negative.       Objective:      Vital Signs (Most Recent):  Temp: 97.7 °F (36.5 °C) (09/05/24 1145)  Pulse: (!) 57 (09/05/24 1200)  Resp: 19 (09/05/24 1200)  BP: 117/65 (09/05/24 1200)  SpO2: 100 % (09/05/24 1200) Vital Signs (24h Range):  Temp:  [97.7 °F (36.5 °C)-98.4 °F (36.9 °C)] 97.7 °F (36.5 °C)  Pulse:  [47-80] 57  Resp:  [12-20] 19  SpO2:  [96 %-100 %] 100 %  BP: (106-138)/(52-96) 117/65     Weight: 119.1 kg (262 lb 9.1 oz)  Body mass index is 34.64 kg/m².    Intake/Output Summary (Last 24 hours) at 9/5/2024 1635  Last data filed at 9/5/2024 1058  Gross per 24 hour   Intake 2130 ml   Output 1 ml   Net 2129 ml         Physical Exam  Constitutional:       General: He is not in acute distress.     Appearance: He is obese.   HENT:      Head: Normocephalic and atraumatic.      Mouth/Throat:      Mouth: Mucous membranes are moist.   Eyes:      Extraocular Movements: Extraocular movements intact.      Pupils: Pupils are equal, round, and reactive to light.   Cardiovascular:      Rate and Rhythm: Normal rate and regular rhythm.      Pulses: Normal pulses.      Heart sounds: Murmur heard.   Abdominal:      General: Bowel sounds are normal. There is no distension.      Palpations: Abdomen is soft.      Tenderness: There is no abdominal tenderness.   Musculoskeletal:      Cervical back: Neck supple.   Skin:     General: Skin is warm.      Capillary Refill: Capillary refill takes less than 2 seconds.      Comments: Dressing in place, some blood noted from dressing at amputation site   Neurological:      General: No focal deficit present.      Mental Status: He is alert. Mental status is at baseline.   Psychiatric:         Mood and Affect: Mood normal.             Significant Labs: All pertinent labs within the past 24 hours have been reviewed.  BMP:   Recent Labs   Lab 09/05/24  1421   *      K 4.6      CO2 31*   BUN 14   CREATININE 1.1   CALCIUM 9.4     CBC:   Recent Labs   Lab 09/04/24  0702 09/05/24  0556   WBC 7.71 7.44    HGB 14.3 14.1   HCT 44.5 44.8    155       Significant Imaging: I have reviewed all pertinent imaging results/findings within the past 24 hours.  I have reviewed and interpreted all pertinent imaging results/findings within the past 24 hours.    Assessment/Plan:      * Osteomyelitis of toe of right foot  S/p amputation with source control. DC antibiotics    Oxycodone 10mg BID PRN for pain      UTI (urinary tract infection)  Continue IV antibiotics  Urine culture pending      Type 2 diabetes mellitus, without long-term current use of insulin  Patient's FSGs are uncontrolled due to hyperglycemia on current medication regimen.  Last A1c reviewed-   Lab Results   Component Value Date    HGBA1C 7.8 (H) 09/04/2024     Most recent fingerstick glucose reviewed-   Recent Labs   Lab 09/04/24  1711 09/04/24  1953 09/05/24  0634 09/05/24  1118   POCTGLUCOSE 156* 160* 236* 161*     Current correctional scale  Low    Suboptimal control, will add basal/bolus regimen and monitor CBGs  Maintain anti-hyperglycemic dose as follows-   Antihyperglycemics (From admission, onward)      Start     Stop Route Frequency Ordered    09/04/24 2100  insulin glargine U-100 (Lantus) pen 8 Units         -- SubQ Nightly 09/04/24 1646    09/04/24 1700  insulin aspart U-100 pen 5 Units         -- SubQ 3 times daily with meals 09/04/24 1646    09/03/24 1713  insulin aspart U-100 pen 0-5 Units         -- SubQ Before meals & nightly PRN 09/03/24 1618          Hold Oral hypoglycemics while patient is in the hospital.     Diabetic teaching needed  Needs pcp on DC        VTE Risk Mitigation (From admission, onward)           Ordered     IP VTE HIGH RISK PATIENT  Once         09/03/24 1618     Place sequential compression device  Until discontinued         09/03/24 1618                    Discharge Planning   SANDRA: 9/6/2024     Code Status: Full Code   Is the patient medically ready for discharge?:     Reason for patient still in hospital (select all  that apply): PT / OT recommendations  Discharge Plan A: Home                  Olga Cruz MD  Department of Hospital Medicine   [facility]

## 2024-09-05 NOTE — SUBJECTIVE & OBJECTIVE
Interval History: Patient seen post procedure doing well, some blood noted on his bandage and reports some aching as to be expected but reports feeling better. Denies subjective fever, chills, chest pain or dyspnea.    Review of Systems   Constitutional: Negative.    HENT: Negative.     Respiratory: Negative.     Cardiovascular: Negative.    Gastrointestinal: Negative.    Genitourinary: Negative.    Musculoskeletal:         Pain at amputation site   Skin:  Positive for wound.   Neurological: Negative.    Psychiatric/Behavioral: Negative.       Objective:     Vital Signs (Most Recent):  Temp: 97.7 °F (36.5 °C) (09/05/24 1145)  Pulse: (!) 57 (09/05/24 1200)  Resp: 19 (09/05/24 1200)  BP: 117/65 (09/05/24 1200)  SpO2: 100 % (09/05/24 1200) Vital Signs (24h Range):  Temp:  [97.7 °F (36.5 °C)-98.4 °F (36.9 °C)] 97.7 °F (36.5 °C)  Pulse:  [47-80] 57  Resp:  [12-20] 19  SpO2:  [96 %-100 %] 100 %  BP: (106-138)/(52-96) 117/65     Weight: 119.1 kg (262 lb 9.1 oz)  Body mass index is 34.64 kg/m².    Intake/Output Summary (Last 24 hours) at 9/5/2024 1635  Last data filed at 9/5/2024 1058  Gross per 24 hour   Intake 2130 ml   Output 1 ml   Net 2129 ml         Physical Exam  Constitutional:       General: He is not in acute distress.     Appearance: He is obese.   HENT:      Head: Normocephalic and atraumatic.      Mouth/Throat:      Mouth: Mucous membranes are moist.   Eyes:      Extraocular Movements: Extraocular movements intact.      Pupils: Pupils are equal, round, and reactive to light.   Cardiovascular:      Rate and Rhythm: Normal rate and regular rhythm.      Pulses: Normal pulses.      Heart sounds: Murmur heard.   Abdominal:      General: Bowel sounds are normal. There is no distension.      Palpations: Abdomen is soft.      Tenderness: There is no abdominal tenderness.   Musculoskeletal:      Cervical back: Neck supple.   Skin:     General: Skin is warm.      Capillary Refill: Capillary refill takes less than 2  seconds.      Comments: Dressing in place, some blood noted from dressing at amputation site   Neurological:      General: No focal deficit present.      Mental Status: He is alert. Mental status is at baseline.   Psychiatric:         Mood and Affect: Mood normal.             Significant Labs: All pertinent labs within the past 24 hours have been reviewed.  BMP:   Recent Labs   Lab 09/05/24  1421   *      K 4.6      CO2 31*   BUN 14   CREATININE 1.1   CALCIUM 9.4     CBC:   Recent Labs   Lab 09/04/24  0702 09/05/24  0556   WBC 7.71 7.44   HGB 14.3 14.1   HCT 44.5 44.8    155       Significant Imaging: I have reviewed all pertinent imaging results/findings within the past 24 hours.  I have reviewed and interpreted all pertinent imaging results/findings within the past 24 hours.

## 2024-09-05 NOTE — ASSESSMENT & PLAN NOTE
Patient's FSGs are uncontrolled due to hyperglycemia on current medication regimen.  Last A1c reviewed-   Lab Results   Component Value Date    HGBA1C 7.8 (H) 09/04/2024     Most recent fingerstick glucose reviewed-   Recent Labs   Lab 09/04/24  1711 09/04/24  1953 09/05/24  0634 09/05/24  1118   POCTGLUCOSE 156* 160* 236* 161*     Current correctional scale  Low    Suboptimal control, will add basal/bolus regimen and monitor CBGs  Maintain anti-hyperglycemic dose as follows-   Antihyperglycemics (From admission, onward)      Start     Stop Route Frequency Ordered    09/04/24 2100  insulin glargine U-100 (Lantus) pen 8 Units         -- SubQ Nightly 09/04/24 1646    09/04/24 1700  insulin aspart U-100 pen 5 Units         -- SubQ 3 times daily with meals 09/04/24 1646    09/03/24 1713  insulin aspart U-100 pen 0-5 Units         -- SubQ Before meals & nightly PRN 09/03/24 1618          Hold Oral hypoglycemics while patient is in the hospital.     Diabetic teaching needed  Needs pcp on DC

## 2024-09-06 VITALS
OXYGEN SATURATION: 100 % | DIASTOLIC BLOOD PRESSURE: 55 MMHG | HEART RATE: 54 BPM | RESPIRATION RATE: 18 BRPM | BODY MASS INDEX: 34.8 KG/M2 | WEIGHT: 262.56 LBS | TEMPERATURE: 98 F | SYSTOLIC BLOOD PRESSURE: 113 MMHG | HEIGHT: 73 IN

## 2024-09-06 PROBLEM — Z89.421 STATUS POST AMPUTATION OF LESSER TOE OF RIGHT FOOT: Status: ACTIVE | Noted: 2024-09-03

## 2024-09-06 LAB
BACTERIA UR CULT: NO GROWTH
BASOPHILS # BLD AUTO: 0.04 K/UL (ref 0–0.2)
BASOPHILS NFR BLD: 0.5 % (ref 0–1.9)
DIFFERENTIAL METHOD BLD: ABNORMAL
EOSINOPHIL # BLD AUTO: 0.3 K/UL (ref 0–0.5)
EOSINOPHIL NFR BLD: 3 % (ref 0–8)
ERYTHROCYTE [DISTWIDTH] IN BLOOD BY AUTOMATED COUNT: 14.3 % (ref 11.5–14.5)
GRAM STN SPEC: NORMAL
GRAM STN SPEC: NORMAL
HCT VFR BLD AUTO: 39.9 % (ref 40–54)
HGB BLD-MCNC: 13.2 G/DL (ref 14–18)
IMM GRANULOCYTES # BLD AUTO: 0.04 K/UL (ref 0–0.04)
IMM GRANULOCYTES NFR BLD AUTO: 0.5 % (ref 0–0.5)
LYMPHOCYTES # BLD AUTO: 1.9 K/UL (ref 1–4.8)
LYMPHOCYTES NFR BLD: 22.7 % (ref 18–48)
MCH RBC QN AUTO: 31.4 PG (ref 27–31)
MCHC RBC AUTO-ENTMCNC: 33.1 G/DL (ref 32–36)
MCV RBC AUTO: 95 FL (ref 82–98)
MONOCYTES # BLD AUTO: 0.5 K/UL (ref 0.3–1)
MONOCYTES NFR BLD: 6 % (ref 4–15)
NEUTROPHILS # BLD AUTO: 5.8 K/UL (ref 1.8–7.7)
NEUTROPHILS NFR BLD: 67.3 % (ref 38–73)
NRBC BLD-RTO: 0 /100 WBC
PLATELET # BLD AUTO: 182 K/UL (ref 150–450)
PMV BLD AUTO: 11.3 FL (ref 9.2–12.9)
POCT GLUCOSE: 152 MG/DL (ref 70–110)
RBC # BLD AUTO: 4.21 M/UL (ref 4.6–6.2)
WBC # BLD AUTO: 8.54 K/UL (ref 3.9–12.7)

## 2024-09-06 PROCEDURE — 25000003 PHARM REV CODE 250

## 2024-09-06 PROCEDURE — 63600175 PHARM REV CODE 636 W HCPCS

## 2024-09-06 PROCEDURE — 25000003 PHARM REV CODE 250: Performed by: NURSE PRACTITIONER

## 2024-09-06 PROCEDURE — 94761 N-INVAS EAR/PLS OXIMETRY MLT: CPT

## 2024-09-06 PROCEDURE — 25000003 PHARM REV CODE 250: Performed by: INTERNAL MEDICINE

## 2024-09-06 PROCEDURE — 97161 PT EVAL LOW COMPLEX 20 MIN: CPT

## 2024-09-06 PROCEDURE — 85025 COMPLETE CBC W/AUTO DIFF WBC: CPT | Performed by: NURSE PRACTITIONER

## 2024-09-06 PROCEDURE — 36415 COLL VENOUS BLD VENIPUNCTURE: CPT | Performed by: NURSE PRACTITIONER

## 2024-09-06 RX ORDER — LEVOFLOXACIN 750 MG/1
750 TABLET ORAL DAILY
Qty: 5 TABLET | Refills: 0 | Status: SHIPPED | OUTPATIENT
Start: 2024-09-06 | End: 2024-09-11

## 2024-09-06 RX ORDER — OXYCODONE HYDROCHLORIDE 10 MG/1
10 TABLET ORAL EVERY 8 HOURS PRN
Qty: 9 TABLET | Refills: 0 | Status: SHIPPED | OUTPATIENT
Start: 2024-09-06 | End: 2024-09-09

## 2024-09-06 RX ORDER — CLOPIDOGREL BISULFATE 75 MG/1
75 TABLET ORAL DAILY
Qty: 30 TABLET | Refills: 11 | Status: SHIPPED | OUTPATIENT
Start: 2024-09-06 | End: 2025-09-06

## 2024-09-06 RX ORDER — METHOCARBAMOL 750 MG/1
750 TABLET, FILM COATED ORAL EVERY 6 HOURS PRN
Status: DISCONTINUED | OUTPATIENT
Start: 2024-09-06 | End: 2024-09-06 | Stop reason: HOSPADM

## 2024-09-06 RX ORDER — NALOXONE HYDROCHLORIDE 4 MG/.1ML
1 SPRAY NASAL ONCE
Qty: 1 EACH | Refills: 0 | Status: SHIPPED | OUTPATIENT
Start: 2024-09-06 | End: 2024-09-06

## 2024-09-06 RX ORDER — GABAPENTIN 400 MG/1
800 CAPSULE ORAL 3 TIMES DAILY
Qty: 30 CAPSULE | Refills: 0 | Status: SHIPPED | OUTPATIENT
Start: 2024-09-06

## 2024-09-06 RX ADMIN — METHOCARBAMOL 750 MG: 750 TABLET ORAL at 02:09

## 2024-09-06 RX ADMIN — OXYCODONE HYDROCHLORIDE 10 MG: 5 TABLET ORAL at 07:09

## 2024-09-06 RX ADMIN — MORPHINE SULFATE 4 MG: 4 INJECTION, SOLUTION INTRAMUSCULAR; INTRAVENOUS at 03:09

## 2024-09-06 RX ADMIN — OXYCODONE HYDROCHLORIDE 10 MG: 5 TABLET ORAL at 12:09

## 2024-09-06 RX ADMIN — GABAPENTIN 800 MG: 300 CAPSULE ORAL at 07:09

## 2024-09-06 RX ADMIN — INSULIN ASPART 5 UNITS: 100 INJECTION, SOLUTION INTRAVENOUS; SUBCUTANEOUS at 07:09

## 2024-09-06 RX ADMIN — Medication 9 MG: at 12:09

## 2024-09-06 RX ADMIN — MICONAZOLE NITRATE: 20 POWDER TOPICAL at 07:09

## 2024-09-06 NOTE — CARE UPDATE
09/05/24 2004   Patient Assessment/Suction   Level of Consciousness (AVPU) alert   Respiratory Effort Normal;Unlabored   Expansion/Accessory Muscles/Retractions no use of accessory muscles   All Lung Fields Breath Sounds equal bilaterally;clear   Rhythm/Pattern, Respiratory unlabored   Cough Frequency frequent   Cough Type good;loose;productive   Secretions Amount small   Secretions Color brown;yellow   Secretions Characteristics thick   PRE-TX-O2   Device (Oxygen Therapy) room air   SpO2 (!) 93 %   Pulse Oximetry Type Intermittent   $ Pulse Oximetry - Multiple Charge Pulse Oximetry - Multiple   Pulse (!) 59   Resp 20   Aerosol Therapy   $ Aerosol Therapy Charges Aerosol Treatment   Daily Review of Necessity (SVN) completed   Respiratory Treatment Status (SVN) given   Treatment Route (SVN) mouthpiece utilized   Patient Position sitting on edge of bed   Post Treatment Assessment (SVN) breath sounds unchanged   Signs of Intolerance (SVN) none   Breath Sounds Post-Respiratory Treatment   Throughout All Fields Post-Treatment All Fields   Throughout All Fields Post-Treatment no change   Post-treatment Heart Rate (beats/min) 61   Post-treatment Resp Rate (breaths/min) 20   Education   $ Education Bronchodilator;15 min

## 2024-09-06 NOTE — NURSING
Discharge teaching given, all questions answered. Pt walking to parking lot with friend picking him up in vehicle.    no

## 2024-09-06 NOTE — PT/OT/SLP EVAL
Physical Therapy Evaluation    Patient Name:  Tolu Lyles Sr.   MRN:  [id number]    Recommendations:     Discharge Recommendations: Low Intensity Therapy   Discharge Equipment Recommendations: rollator   Barriers to discharge:  pain    Assessment:     Tolu Lyles Sr. is a 54 y.o. male admitted with a medical diagnosis of Status post amputation of lesser toe of right foot.  He presents with the following impairments/functional limitations: weakness, impaired endurance, impaired self care skills, impaired functional mobility, gait instability, impaired balance, decreased lower extremity function, decreased safety awareness, pain, impaired cardiopulmonary response to activity.    Pt found seated on EOB. Pt agreeable to visit. Pt verbalizes understanding of limited WB. Pt requesting rollator.     Tolu's mobility limitation cannot be sufficiently resolved by the use of a cane. His functional mobility deficit can be sufficiently resolved with the use of a Rollator. Patient's mobility limitation significantly impairs their ability to participate in one of more activities of daily living.  The use of a Rollator will significantly improve the patient's ability to participate in MRADLS and the patient will use it on regular basis in the home.       Pt demonstrates good form with rollator and able to safely utilize seated for rest break    Rehab Prognosis: Fair; patient would benefit from acute skilled PT services to address these deficits and reach maximum level of function.    Recent Surgery: Procedure(s) (LRB):  AMPUTATION, TOE (Right) 1 Day Post-Op    Plan:     During this hospitalization, patient to be seen 5 x/week to address the identified rehab impairments via gait training, therapeutic activities, therapeutic exercises, neuromuscular re-education and progress toward the following goals:    Plan of Care Expires:  10/06/24    Subjective     Chief Complaint: toe pain  Patient/Family Comments/goals: get a  rollator  Pain/Comfort:  Pain Rating 1: 6/10  Location - Side 1: Right  Location 1: foot  Pain Addressed 1: Reposition, Distraction, Cessation of Activity    Patients cultural, spiritual, Amish conflicts given the current situation: no    Living Environment:  Pt lives in a [facility] home with no SUSANNE (-)   Prior to admission, patients level of function was Independent.  Equipment used at home: none.  DME owned (not currently used): none.  Upon discharge, patient will have assistance from friend.    Objective:     Communicated with RN prior to session.  Patient found sitting edge of bed with peripheral IV, telemetry  upon PT entry to room.    General Precautions: Standard, fall  Orthopedic Precautions: (limited WB R LE)   Braces: N/A  Respiratory Status: Room air    Exams:  Cognitive Exam:  Patient is oriented to Person, Place, Time, and Situation  RLE ROM: WFL except unable to assess ankle due to cam boot  RLE Strength: WFL except unable to assess ankle due to cam boot  LLE ROM: WFL  LLE Strength: WFL    Functional Mobility:  Transfers:     Sit to Stand:  independence with no AD  Gait: 10 ft with no AD and supervision, 30 ft with rollator and supervision       AM-PAC 6 CLICK MOBILITY  Total Score:24       Treatment & Education:  Pt educated on POC, discharge recommendation, DME needs, importance of time OOB, rollator parts and use, need for assist with mobility, use of call bell to seek assistance as needed and fall prevention      Patient left sitting edge of bed with all lines intact and call button in reach.    GOALS:   Multidisciplinary Problems       Physical Therapy Goals          Problem: Physical Therapy    Goal Priority Disciplines Outcome Goal Variances Interventions   Physical Therapy Goal     PT, PT/OT Progressing     Description: Goals to be met by: 10/6/24     Patient will increase functional independence with mobility by performin. Gait  x 100 feet with Supervision using rollator,  independently use rollator for seated rest break with no VI                             History:     Past Medical History:   Diagnosis Date    Chronic back pain     COPD (chronic obstructive pulmonary disease)     Coronary artery disease     Diabetes mellitus     Lung nodule     Sleep apnea     Unspecified injury of head, initial encounter 1975       Past Surgical History:   Procedure Laterality Date    BRONCHOSCOPY WITH FLUOROSCOPY N/A 10/25/2023    Procedure: BRONCHOSCOPY, WITH FLUOROSCOPY;  Surgeon: Alice Holman MD;  Location: [facility] [place] ENDO;  Service: Pulmonary;  Laterality: N/A;    CARDIAC SURGERY  2001    CARIDAC STENTS 7 ;  TEXARCANA    TOE AMPUTATION Right 9/5/2024    Procedure: AMPUTATION, TOE;  Surgeon: Ventura Gray DPM;  Location: [facility] [place] OR;  Service: Podiatry;  Laterality: Right;  2nd toe       Time Tracking:     PT Received On: 09/06/24  PT Start Time: 0919     PT Stop Time: 0928  PT Total Time (min): 9 min     Billable Minutes: Evaluation 9 09/06/2024

## 2024-09-06 NOTE — PLAN OF CARE
[facility] ([facility]) reports not in network with insurance - spoke with [facility] ([facility]) [phone] - rollator order & clinicals faxed to [phone] - will process request & plan delivery to bedside - will update CM once approved

## 2024-09-06 NOTE — DISCHARGE INSTRUCTIONS
Please take home medications as prescribed including metformin and lisinopril. Please only take lasix as needed for excessive swelling of the legs, shortness of breath or greater than 3lb weight gain in 1 day. Address all meds with PCP as well as monitoring and optimization of your diabetes. A script was printed for a short course of oxycodone given your post operative pain.

## 2024-09-07 LAB
BACTERIA SPEC AEROBE CULT: ABNORMAL
BACTERIA SPEC ANAEROBE CULT: NORMAL

## 2024-09-08 LAB
BACTERIA BLD CULT: NORMAL
BACTERIA BLD CULT: NORMAL

## 2024-09-09 ENCOUNTER — TELEPHONE (OUTPATIENT)
Dept: PODIATRY | Facility: CLINIC | Age: 54
End: 2024-09-09
Payer: MEDICARE

## 2024-09-09 LAB
ACID FAST MOD KINY STN SPEC: NORMAL

## 2024-09-09 NOTE — TELEPHONE ENCOUNTER
----- Message from Mercy Preston LPN sent at 9/6/2024 11:26 AM CDT -----  Regarding: FW: hospital follow up    ----- Message -----  From: Rosmery Hodges RN  Sent: 9/6/2024  11:09 AM CDT  To: Ventura Gray Staff  Subject: hospital follow up                               Hi patient is being discharged from [facility] and will need a follow up with Dr. Gray. Please contact patient to schedule.    Thank you,   Rosmery Hodges, RN

## 2024-09-16 ENCOUNTER — PATIENT OUTREACH (OUTPATIENT)
Dept: ADMINISTRATIVE | Facility: OTHER | Age: 54
End: 2024-09-16
Payer: MEDICARE

## 2024-09-18 NOTE — HOSPITAL COURSE
54-year-old with history type 2 diabetes, hypertension, coronary artery disease status post stent placement presented with significant bands right lesser toe.  Patient has had elevated CRP on admission.  Blood cultures negative patient was mildly stable graphic imaging was consistent with osteomyelitis of the lesser toe podiatry was consulted and spoke to patient regarding options.  Patient opted for amputation toe.  The procedure was performed without issue.  Infectious disease was the case recommended.  Course of 5 days.  Patient discharged with a 5 day course of ciprofloxacin and follow up with podiatrist.  Additionally, patient reported on the day of his discharge that he had history of stent placement.  He states that this was earlier this year.  Patient is not on dual antiplatelet therapy on admission.  She was on aspirin, however.  I discharge the patient with this short script very case management were to get the patient appointment with primary care physician.  All of these things were to be addressed with patient's new PCP.  Patient demonstrated understanding.  Assessment patient with a day of discharge revealed a gentleman resolved condition to be discharged home.  This hemodynamically stable in acute distress.

## 2024-09-18 NOTE — DISCHARGE SUMMARY
[facility] Medicine  Discharge Summary      Patient Name: Tolu Lyles Sr.  MRN: [id number]  KING: [id number]  Patient Class: IP- Inpatient  Admission Date: 9/3/2024  Hospital Length of Stay: 3 days  Discharge Date and Time:  9/6 7:01 AM  Attending Physician: Liana att. providers found   Discharging Provider: Olga Cruz MD  Primary Care Provider: Liana, Primary Doctor    Primary Care Team: Networked reference to record PCT     HPI:   54 year old male with a past medical history of chronic back pain, COPD, lung cancer, coronary artery disease, diabetes mellitus, sleep apnea who presents to the emergency room with reports of swelling and redness to his foot. Reports that he had an infected 2nd with beginning stages of osteomyelitis, the black area fell off and now it is with increased redness and purulent drainage.  He also reports increased warmth and swelling to the foot.  Denies any fevers or chills or exacerbating or alleviating factors otherwise.  In the ER, WBC 11.25, sodium 135, sed rate 9, BUN 37 creatinine 1.4 glucose 210 CRP 2.10 lactic acid 1.4 UA 3+ leukocytes, 92 WBCs blood cultures urine culture sent.  Right foot x-ray  Osseous destruction of the distal 2nd phalanx is increased, Soft tissue swelling of the forefoot and 2nd digit.  IV Zosyn, IV vancomycin, IV morphine, tramadol given.  Admit to hospital medicine for  further medical management rule out osteomyelitis    Procedure(s) (LRB):  AMPUTATION, TOE (Right)      Hospital Course:   54-year-old with history type 2 diabetes, hypertension, coronary artery disease status post stent placement presented with significant bands right lesser toe.  Patient has had elevated CRP on admission.  Blood cultures negative patient was mildly stable graphic imaging was consistent with osteomyelitis of the lesser toe podiatry was consulted and spoke to patient regarding options.  Patient opted for amputation toe.  The procedure was performed  without issue.  Infectious disease was the case recommended.  Course of 5 days.  Patient discharged with a 5 day course of ciprofloxacin and follow up with podiatrist.  Additionally, patient reported on the day of his discharge that he had history of stent placement.  He states that this was earlier this year.  Patient is not on dual antiplatelet therapy on admission.  She was on aspirin, however.  I discharge the patient with this short script very case management were to get the patient appointment with primary care physician.  All of these things were to be addressed with patient's new PCP.  Patient demonstrated understanding.  Assessment patient with a day of discharge revealed a gentleman resolved condition to be discharged home.  This hemodynamically stable in acute distress.     Goals of Care Treatment Preferences:  Code Status: Full Code      SDOH Screening:  The patient was screened for food insecurity, housing instability, transportation needs, utility difficulties, and interpersonal safety. The social determinant(s) of health identified as a concern this admission are:  Housing instability  Food insecurity  Transportation difficulties    The plan to address these concerns is: PCP to refer to social work in o/p setting    Social Determinants of Health with Concerns     Food Insecurity: Food Insecurity Present (9/3/2024)   Housing Stability: High Risk (9/3/2024)   Transportation Needs: Unmet Transportation Needs (9/3/2024)        Consults:   Consults (From admission, onward)          Status Ordering Provider     Inpatient consult to Infectious Diseases  Once        Provider:  Mauro Guy MD    Completed DI MONTOYA     Inpatient consult to Podiatry  Once        Provider:  Ventura Gray DPM    Completed SANDHYA CEBALLOS            No new Assessment & Plan notes have been filed under this hospital service since the last note was generated.  Service: Hospital Medicine    Final Active Diagnoses:     "Diagnosis Date Noted POA    PRINCIPAL PROBLEM:  Status post amputation of lesser toe of right foot [Z89.421] 09/03/2024 Not Applicable    Type 2 diabetes mellitus, without long-term current use of insulin [E11.9] 11/09/2023 Yes      Problems Resolved During this Admission:       Discharged Condition: stable    Disposition: Home or Self Care    Follow Up:   Follow-up Information       [place] [facility]. Go on 9/16/2024.    Specialty: Primary Care  Why: @ 1:00 pm  Contact information:  [address]  [place] [id number] [phone]  Additional information:  [address]             Ventura Gray DPM Follow up.    Specialties: Podiatry, Surgery, Wound Care  Contact information:  [address]  [address]  [place] [address]  [phone]               [facility] Follow up.    Why: contact for any issues with rollator  Contact information:  [address]  [address]  [place] [address]  [phone]                         Patient Instructions:      WALKER FOR HOME USE     Order Specific Question Answer Comments   Type of Walker: Rollator with brakes and/or seat    With wheels? Yes    Height: 6' 1" (1.854 m)    Weight: 119.1 kg (262 lb 9.1 oz)    Length of need (1-99 months): 99    Does patient have medical equipment at home? none    Please check all that apply: Patient's condition impairs ambulation.      Ambulatory referral/consult to Outpatient Case Management   Referral Priority: Routine Referral Type: Consultation   Referral Reason: Specialty Services Required   Number of Visits Requested: 1     Ambulatory referral/consult to Pain Clinic   Standing Status: Future   Referral Priority: Routine Referral Type: Consultation   Referral Reason: Specialty Services Required   Requested Specialty: Pain Medicine   Number of Visits Requested: 1     Ambulatory referral/consult to Urology   Standing Status: Future   Referral Priority: Routine Referral Type: Consultation   Referral " "Reason: Specialty Services Required   Requested Specialty: Urology   Number of Visits Requested: 1     Ambulatory referral/consult to Pulmonology   Standing Status: Future   Referral Priority: Routine Referral Type: Consultation   Referral Reason: Specialty Services Required   Requested Specialty: Pulmonary Disease   Number of Visits Requested: 1     Diet diabetic     Activity as tolerated       Significant Diagnostic Studies: Labs: BMP: No results for input(s): "GLU", "NA", "K", "CL", "CO2", "BUN", "CREATININE", "CALCIUM", "MG" in the last 48 hours. and CBC No results for input(s): "WBC", "HGB", "HCT", "PLT" in the last 48 hours.    Pending Diagnostic Studies:       None           Medications:  Reconciled Home Medications:      Medication List        START taking these medications      clopidogreL 75 mg tablet  Commonly known as: PLAVIX  Take 1 tablet (75 mg total) by mouth once daily.            CONTINUE taking these medications      furosemide 40 MG tablet  Commonly known as: LASIX  Take 40 mg by mouth once daily.     gabapentin 400 MG capsule  Commonly known as: NEURONTIN  Take 2 capsules (800 mg total) by mouth 3 (three) times daily.     lisinopriL 10 MG tablet  Take 10 mg by mouth once daily.     metFORMIN 1000 MG tablet  Commonly known as: GLUCOPHAGE  Take 1,000 mg by mouth 2 (two) times daily with meals.     potassium chloride SA 20 MEQ tablet  Commonly known as: K-DUR,KLOR-CON  Take 20 mEq by mouth once daily.            STOP taking these medications      ALPRAZolam 0.25 MG tablet  Commonly known as: XANAX            ASK your doctor about these medications      levoFLOXacin 750 MG tablet  Commonly known as: LEVAQUIN  Take 1 tablet (750 mg total) by mouth once daily. for 5 days  Ask about: Should I take this medication?     naloxone 4 mg/actuation Spry  Commonly known as: NARCAN  1 spray (4 mg total) by Nasal route once. for 1 dose  Ask about: Should I take this medication?     oxyCODONE 10 mg Tab immediate " release tablet  Commonly known as: ROXICODONE  Take 1 tablet (10 mg total) by mouth every 8 (eight) hours as needed for Pain.  Ask about: Should I take this medication?              Indwelling Lines/Drains at time of discharge:   Lines/Drains/Airways       None                   Time spent on the discharge of patient: 35 minutes         Olga Cruz MD  Department of Hospital Medicine  [facility]

## 2024-09-19 LAB
FUNGUS SPEC CULT: NORMAL
FUNGUS SPEC CULT: NORMAL

## 2024-10-16 ENCOUNTER — HOSPITAL ENCOUNTER (EMERGENCY)
Facility: HOSPITAL | Age: 54
Discharge: HOME | End: 2024-10-16
Attending: EMERGENCY MEDICINE
Payer: MEDICARE

## 2024-10-16 VITALS
OXYGEN SATURATION: 100 % | HEIGHT: 73 IN | TEMPERATURE: 98 F | RESPIRATION RATE: 16 BRPM | BODY MASS INDEX: 34.71 KG/M2 | DIASTOLIC BLOOD PRESSURE: 77 MMHG | WEIGHT: 261.94 LBS | HEART RATE: 75 BPM | SYSTOLIC BLOOD PRESSURE: 152 MMHG

## 2024-10-16 DIAGNOSIS — G89.29 CHRONIC BILATERAL LOW BACK PAIN WITH BILATERAL SCIATICA: ICD-10-CM

## 2024-10-16 DIAGNOSIS — M54.41 CHRONIC BILATERAL LOW BACK PAIN WITH BILATERAL SCIATICA: ICD-10-CM

## 2024-10-16 DIAGNOSIS — Z76.0 ENCOUNTER FOR MEDICATION REFILL: Primary | ICD-10-CM

## 2024-10-16 DIAGNOSIS — M79.671 FOOT PAIN, RIGHT: ICD-10-CM

## 2024-10-16 DIAGNOSIS — M54.42 CHRONIC BILATERAL LOW BACK PAIN WITH BILATERAL SCIATICA: ICD-10-CM

## 2024-10-16 DIAGNOSIS — R52 PAIN: ICD-10-CM

## 2024-10-16 LAB — POCT GLUCOSE: 171 MG/DL (ref 70–110)

## 2024-10-16 PROCEDURE — 82962 GLUCOSE BLOOD TEST: CPT

## 2024-10-16 PROCEDURE — 99283 EMERGENCY DEPT VISIT LOW MDM: CPT | Mod: 25

## 2024-10-16 PROCEDURE — 25000003 PHARM REV CODE 250: Performed by: EMERGENCY MEDICINE

## 2024-10-16 RX ORDER — ACETAMINOPHEN 500 MG
500 TABLET ORAL
Status: COMPLETED | OUTPATIENT
Start: 2024-10-16 | End: 2024-10-16

## 2024-10-16 RX ORDER — MUPIROCIN 20 MG/G
OINTMENT TOPICAL 2 TIMES DAILY
Qty: 1 EACH | Refills: 0 | Status: SHIPPED | OUTPATIENT
Start: 2024-10-16 | End: 2024-10-26

## 2024-10-16 RX ORDER — METFORMIN HYDROCHLORIDE 1000 MG/1
1000 TABLET ORAL 2 TIMES DAILY WITH MEALS
Qty: 60 TABLET | Refills: 0 | Status: SHIPPED | OUTPATIENT
Start: 2024-10-16 | End: 2024-11-15

## 2024-10-16 RX ORDER — GABAPENTIN 400 MG/1
400 CAPSULE ORAL 3 TIMES DAILY
Qty: 30 CAPSULE | Refills: 0 | Status: SHIPPED | OUTPATIENT
Start: 2024-10-16

## 2024-10-16 RX ORDER — LISINOPRIL 10 MG/1
10 TABLET ORAL DAILY
Qty: 30 TABLET | Refills: 0 | Status: SHIPPED | OUTPATIENT
Start: 2024-10-16 | End: 2024-11-15

## 2024-10-16 RX ORDER — BACITRACIN 500 [USP'U]/G
OINTMENT TOPICAL
Status: DISCONTINUED | OUTPATIENT
Start: 2024-10-16 | End: 2024-10-16 | Stop reason: HOSPADM

## 2024-10-16 RX ORDER — POTASSIUM CHLORIDE 20 MEQ/1
20 TABLET, EXTENDED RELEASE ORAL DAILY
Qty: 30 TABLET | Refills: 0 | Status: SHIPPED | OUTPATIENT
Start: 2024-10-16 | End: 2024-11-15

## 2024-10-16 RX ORDER — CEPHALEXIN 500 MG/1
500 CAPSULE ORAL EVERY 12 HOURS
Qty: 14 CAPSULE | Refills: 0 | Status: SHIPPED | OUTPATIENT
Start: 2024-10-16 | End: 2024-10-23

## 2024-10-16 RX ORDER — FUROSEMIDE 40 MG/1
40 TABLET ORAL DAILY
Qty: 30 TABLET | Refills: 0 | Status: SHIPPED | OUTPATIENT
Start: 2024-10-16 | End: 2024-11-15

## 2024-10-16 RX ADMIN — ACETAMINOPHEN 500 MG: 500 TABLET, FILM COATED ORAL at 04:10

## 2024-10-16 NOTE — DISCHARGE INSTRUCTIONS
Please follow-up with all specialist as directed   You do have 11 refills at the drug store for Plavix, the other medications will be printed for you  Take antibiotics as directed

## 2024-10-16 NOTE — ED PROVIDER NOTES
Encounter Date: 10/16/2024       History     Chief Complaint   Patient presents with    Wound Check     Right foot, 2nd toe was amputated and states he has been having pain and wants it checked, also out of metformin, fluid pills, gabapentin, muscle relaxer, and potassium medication and would like refills - also says his glucose is high,  CBG in triage is 171    Medication Refill     54 year old male with a past medical history of chronic back pain, COPD, lung cancer, coronary artery disease, diabetes mellitus, sleep apnea, with recent amputation to the 2nd phalanx of the right foot on September 5th secondary to osteomyelitis presents emergency department with complaint of tenderness that has continued since surgery, and mild redness.  Patient was noncompliant with his medications and noncompliant with any follow-up with primary care, pulmonary, Urology, and Podiatry.  Patient reports he does not have a ride, also states he has no phone, however the patient did arrived to the emergency department secondary to his friend dropping him off.  Patient denies any recent fevers    The history is provided by the patient.     Review of patient's allergies indicates:   Allergen Reactions    Codeine Swelling, Anaphylaxis, Hives and Other (See Comments)     Throat swell, rash     Past Medical History:   Diagnosis Date    Chronic back pain     COPD (chronic obstructive pulmonary disease)     Coronary artery disease     Diabetes mellitus     Lung nodule     Sleep apnea     Unspecified injury of head, initial encounter 1975     Past Surgical History:   Procedure Laterality Date    BRONCHOSCOPY WITH FLUOROSCOPY N/A 10/25/2023    Procedure: BRONCHOSCOPY, WITH FLUOROSCOPY;  Surgeon: Alice Holman MD;  [facility]: [facility];  Service: Pulmonary;  Laterality: N/A;    CARDIAC SURGERY  2001    CARIDAC STENTS 7 ;  TEXARCANA    TOE AMPUTATION Right 9/5/2024    Procedure: AMPUTATION, TOE;  Surgeon: Ventura Gray DPM;  Location: [facility] OR;   Service: Podiatry;  Laterality: Right;  2nd toe     Family History   Problem Relation Name Age of Onset    Diabetes Mother      Hypertension Mother      Heart disease Mother      Stroke Mother      Diabetes Father      Hypertension Father      Heart disease Father      Stroke Father      Cancer Maternal Uncle       Social History     Tobacco Use    Smoking status: Former     Current packs/day: 0.00     Average packs/day: 0.5 packs/day for 51.7 years (25.8 ttl pk-yrs)     Types: Cigarettes     Start date: 1973     Quit date: 2024     Years since quittin.1    Smokeless tobacco: Never    Tobacco comments:     Smokes 10 cigarettes daily   Substance Use Topics    Alcohol use: Not Currently    Drug use: Never     Review of Systems   Constitutional:  Negative for chills and fever.   HENT: Negative.     Respiratory: Negative.     Cardiovascular: Negative.    Genitourinary: Negative.    Musculoskeletal:         Tenderness to the right foot near the 2nd metacarpal joint   Neurological: Negative.    Hematological: Negative.    Psychiatric/Behavioral: Negative.     All other systems reviewed and are negative.      Physical Exam     Initial Vitals [10/16/24 1409]   BP Pulse Resp Temp SpO2   (!) 163/89 73 18 97.9 °F (36.6 °C) 100 %      MAP       --         Physical Exam    Nursing note and vitals reviewed.  Constitutional: He appears well-developed and well-nourished.   Musculoskeletal:      Comments: Patient has had a previous right 2nd patient he does have some mild erythema to the wound, very mild wound dehiscence with no drainage no erythema to the dorsal surface of the foot     Neurological: He is alert and oriented to person, place, and time. He has normal strength.         ED Course   Procedures  Labs Reviewed   POCT GLUCOSE - Abnormal       Result Value    POCT Glucose 171 (*)           Imaging Results              X-Ray Foot Complete Right (Final result)  Result time 10/16/24 15:29:39      Final result by  Joao Shaw MD (10/16/24 15:29:39)                   Impression:      No acute fracture or destructive osseous lesion.      Electronically signed by: Joao Shaw  Date:    10/16/2024  Time:    15:29               Narrative:    EXAMINATION:  XR FOOT COMPLETE 3 VIEW RIGHT    CLINICAL HISTORY:  Right foot Pain, unspecified    FINDINGS:  Three views of the right foot compared to 09/03/2024 show interval amputation of the 2nd toe phalanges.  There is no acute fracture, dislocation, or destructive osseous lesion.  Curvilinear calcification along the posterior tibia is unchanged.  There is mild joint space narrowing, with normal bone mineralization.                                       Medications   bacitracin ointment (has no administration in time range)   acetaminophen tablet 500 mg (500 mg Oral Given 10/16/24 1643)     Medical Decision Making  54 year old male with a past medical history of chronic back pain, COPD, lung cancer, coronary artery disease, diabetes mellitus, sleep apnea, with recent amputation to the 2nd phalanx of the right foot on September 5th secondary to osteomyelitis presents emergency department with complaint of tenderness that has continued since surgery, and mild redness.  Patient was noncompliant with his medications and noncompliant with any follow-up with primary care, pulmonary, Urology, and Podiatry, chronic pain management (per amb referrals noted at recent DC)   Patient reports he does not have a ride, also states he has no phone, however the patient did arrived to the emergency department secondary to his friend dropping him off.  Patient denies any recent fevers    The history is provided by the patient.     Considerations include but not limited to osteomyelitis, wound infection, wound dehiscence, pain seeking behavior, chronic pain    54-year-old male with a past medical history of chronic back pain COPD lung cancer CAD diabetes who had a recent amputation of the right 2nd toe  related to diabetic complications in osteomyelitis presents to the emergency department secondary to pain to his foot and mild redness over the incision site.  The patient has had no fevers.  I did review his chart he was seen in the hospital by podiatrist Dr. Gray on September 5th for his toe amputation he was discharged home with multiple ambulatory referrals including pulmonary, Urology, Podiatry, and chronic pain management which she states he has not followed up with secondary to having no phone and no transportation.  The patient has asked me for pain medications while in the emergency department I did order Tylenol however I was cursed out because the patient does not wish to have Tylenol and wants to have narcotic pain medications.  I do not see the necessity of narcotic pain medication when the patient has a history of sleep apnea, and no evidence of osteomyelitis any longer, or significant infection.  Patient was aware that we can not treat for chronic pain management in the emergency department.  X-ray imaging of the foot reveals no acute fracture or destructive osseous lesions, he has very minimal erythema to the wound for this the patient will be given Keflex, and Bactroban ointment.  I did call nothing annual who was case management to assist the patient with help getting to all of his appointments.  I will provide the patient a refill of his medications however he was instructed that he needs to follow up for any further medications. Patient was also personally evaluated by Dr conley who agrees with my plan of care .  The patient was witnessed ambulating in the parking lot by the paramedic travis     Amount and/or Complexity of Data Reviewed  Radiology: ordered. Decision-making details documented in ED Course.    Risk  OTC drugs.  Prescription drug management.                                      Clinical Impression:  Final diagnoses:  [R52] Pain  [Z76.0] Encounter for medication refill  (Primary)  [M79.671] Foot pain, right          ED Disposition Condition    Discharge Stable          ED Prescriptions       Medication Sig Dispense Start Date End Date Auth. Provider    furosemide (LASIX) 40 MG tablet Take 1 tablet (40 mg total) by mouth once daily. 30 tablet 10/16/2024 11/15/2024 Massiel Mcrae FNP    gabapentin (NEURONTIN) 400 MG capsule Take 1 capsule (400 mg total) by mouth 3 (three) times daily. 30 capsule 10/16/2024 -- Massiel Mcrae FNP    lisinopriL 10 MG tablet Take 1 tablet (10 mg total) by mouth once daily. 30 tablet 10/16/2024 11/15/2024 Massiel Mcrae FNP    metFORMIN (GLUCOPHAGE) 1000 MG tablet Take 1 tablet (1,000 mg total) by mouth 2 (two) times daily with meals. 60 tablet 10/16/2024 11/15/2024 Massiel Mcrae FNP    potassium chloride SA (K-DUR,KLOR-CON) 20 MEQ tablet Take 1 tablet (20 mEq total) by mouth once daily. 30 tablet 10/16/2024 11/15/2024 Massiel Mcrae FNP    cephALEXin (KEFLEX) 500 MG capsule Take 1 capsule (500 mg total) by mouth every 12 (twelve) hours. for 7 days 14 capsule 10/16/2024 10/23/2024 Massiel Mcrae FNP    mupirocin (BACTROBAN) 2 % ointment Apply topically 2 (two) times daily. for 10 days 1 each 10/16/2024 10/26/2024 Massiel Mcrae FNP          Follow-up Information    None          Massiel Mcrae FNP  10/16/24 1707       Massiel Mcrae FNP  10/16/24 3919

## 2024-10-16 NOTE — ED NOTES
"Went into pts room to medicate per the orders. Informed pt that I had his tylenol for pain control. Pt stated "Are you fucking kidding me, what the fuck is this going to do? One tylenol? Seriously? This is bullshit." Educated pt that was tylenol was ordered for his pain and is a commonly used pain medication to treat minor aches and pains, including backaches, arthritis, toothaches, etc.   "

## (undated) DEVICE — Device

## (undated) DEVICE — BANDAGE ESMARK ELASTIC ST 4X9

## (undated) DEVICE — ELECTRODE REM PLYHSV RETURN 9

## (undated) DEVICE — SOL NACL IRR 1000ML BTL

## (undated) DEVICE — DRESSING GAUZE XEROFORM 5X9

## (undated) DEVICE — TRAY LOWER EXTREMITY SMH

## (undated) DEVICE — BLADE SURG #15 CARBON STEEL

## (undated) DEVICE — GLOVE PI ULTRA TOUCH G SURGEON

## (undated) DEVICE — SUT PROLENE 3-0 30 RB-1 BL

## (undated) DEVICE — SPONGE GZ WOVEN 12-PLY 4X4IN